# Patient Record
Sex: FEMALE | Race: WHITE | HISPANIC OR LATINO | Employment: OTHER | URBAN - METROPOLITAN AREA
[De-identification: names, ages, dates, MRNs, and addresses within clinical notes are randomized per-mention and may not be internally consistent; named-entity substitution may affect disease eponyms.]

---

## 2017-05-07 ENCOUNTER — APPOINTMENT (OUTPATIENT)
Dept: RADIOLOGY | Facility: MEDICAL CENTER | Age: 82
End: 2017-05-07
Attending: EMERGENCY MEDICINE
Payer: MEDICARE

## 2017-05-07 ENCOUNTER — HOSPITAL ENCOUNTER (EMERGENCY)
Facility: MEDICAL CENTER | Age: 82
End: 2017-05-07
Attending: EMERGENCY MEDICINE
Payer: MEDICARE

## 2017-05-07 VITALS
BODY MASS INDEX: 22.45 KG/M2 | OXYGEN SATURATION: 96 % | WEIGHT: 122 LBS | SYSTOLIC BLOOD PRESSURE: 118 MMHG | HEART RATE: 72 BPM | DIASTOLIC BLOOD PRESSURE: 76 MMHG | HEIGHT: 62 IN | TEMPERATURE: 99.2 F | RESPIRATION RATE: 16 BRPM

## 2017-05-07 DIAGNOSIS — G89.29 CHRONIC NONINTRACTABLE HEADACHE, UNSPECIFIED HEADACHE TYPE: ICD-10-CM

## 2017-05-07 DIAGNOSIS — G89.29 CHRONIC LOW BACK PAIN WITHOUT SCIATICA, UNSPECIFIED BACK PAIN LATERALITY: ICD-10-CM

## 2017-05-07 DIAGNOSIS — R51.9 CHRONIC NONINTRACTABLE HEADACHE, UNSPECIFIED HEADACHE TYPE: ICD-10-CM

## 2017-05-07 DIAGNOSIS — M54.50 CHRONIC LOW BACK PAIN WITHOUT SCIATICA, UNSPECIFIED BACK PAIN LATERALITY: ICD-10-CM

## 2017-05-07 LAB
ALBUMIN SERPL BCP-MCNC: 4.2 G/DL (ref 3.2–4.9)
ALBUMIN/GLOB SERPL: 1.5 G/DL
ALP SERPL-CCNC: 69 U/L (ref 30–99)
ALT SERPL-CCNC: 16 U/L (ref 2–50)
ANION GAP SERPL CALC-SCNC: 10 MMOL/L (ref 0–11.9)
APPEARANCE UR: CLEAR
APTT PPP: 25.4 SEC (ref 24.7–36)
AST SERPL-CCNC: 27 U/L (ref 12–45)
BASOPHILS # BLD AUTO: 0.5 % (ref 0–1.8)
BASOPHILS # BLD: 0.04 K/UL (ref 0–0.12)
BILIRUB SERPL-MCNC: 0.3 MG/DL (ref 0.1–1.5)
BILIRUB UR QL STRIP.AUTO: NEGATIVE
BNP SERPL-MCNC: 21 PG/ML (ref 0–100)
BUN SERPL-MCNC: 27 MG/DL (ref 8–22)
CALCIUM SERPL-MCNC: 10.2 MG/DL (ref 8.5–10.5)
CHLORIDE SERPL-SCNC: 105 MMOL/L (ref 96–112)
CO2 SERPL-SCNC: 22 MMOL/L (ref 20–33)
COLOR UR: NORMAL
CREAT SERPL-MCNC: 0.76 MG/DL (ref 0.5–1.4)
CULTURE IF INDICATED INDCX: NO UA CULTURE
EKG IMPRESSION: NORMAL
EKG IMPRESSION: NORMAL
EOSINOPHIL # BLD AUTO: 0.06 K/UL (ref 0–0.51)
EOSINOPHIL NFR BLD: 0.8 % (ref 0–6.9)
ERYTHROCYTE [DISTWIDTH] IN BLOOD BY AUTOMATED COUNT: 39.6 FL (ref 35.9–50)
GFR SERPL CREATININE-BSD FRML MDRD: >60 ML/MIN/1.73 M 2
GLOBULIN SER CALC-MCNC: 2.8 G/DL (ref 1.9–3.5)
GLUCOSE SERPL-MCNC: 107 MG/DL (ref 65–99)
GLUCOSE UR STRIP.AUTO-MCNC: NEGATIVE MG/DL
HCT VFR BLD AUTO: 39.8 % (ref 37–47)
HGB BLD-MCNC: 13.4 G/DL (ref 12–16)
IMM GRANULOCYTES # BLD AUTO: 0.01 K/UL (ref 0–0.11)
IMM GRANULOCYTES NFR BLD AUTO: 0.1 % (ref 0–0.9)
INR PPP: 1 (ref 0.87–1.13)
KETONES UR STRIP.AUTO-MCNC: NEGATIVE MG/DL
LEUKOCYTE ESTERASE UR QL STRIP.AUTO: NEGATIVE
LIPASE SERPL-CCNC: 41 U/L (ref 11–82)
LYMPHOCYTES # BLD AUTO: 3.28 K/UL (ref 1–4.8)
LYMPHOCYTES NFR BLD: 44.2 % (ref 22–41)
MCH RBC QN AUTO: 28.9 PG (ref 27–33)
MCHC RBC AUTO-ENTMCNC: 33.7 G/DL (ref 33.6–35)
MCV RBC AUTO: 85.8 FL (ref 81.4–97.8)
MICRO URNS: NORMAL
MONOCYTES # BLD AUTO: 0.54 K/UL (ref 0–0.85)
MONOCYTES NFR BLD AUTO: 7.3 % (ref 0–13.4)
NEUTROPHILS # BLD AUTO: 3.49 K/UL (ref 2–7.15)
NEUTROPHILS NFR BLD: 47.1 % (ref 44–72)
NITRITE UR QL STRIP.AUTO: NEGATIVE
NRBC # BLD AUTO: 0 K/UL
NRBC BLD AUTO-RTO: 0 /100 WBC
PH UR STRIP.AUTO: 6.5 [PH]
PLATELET # BLD AUTO: 272 K/UL (ref 164–446)
PMV BLD AUTO: 10.3 FL (ref 9–12.9)
POTASSIUM SERPL-SCNC: 3.6 MMOL/L (ref 3.6–5.5)
PROT SERPL-MCNC: 7 G/DL (ref 6–8.2)
PROT UR QL STRIP: NEGATIVE MG/DL
PROTHROMBIN TIME: 13.5 SEC (ref 12–14.6)
RBC # BLD AUTO: 4.64 M/UL (ref 4.2–5.4)
RBC UR QL AUTO: NEGATIVE
SODIUM SERPL-SCNC: 137 MMOL/L (ref 135–145)
SP GR UR STRIP.AUTO: 1.02
TROPONIN I SERPL-MCNC: <0.01 NG/ML (ref 0–0.04)
WBC # BLD AUTO: 7.4 K/UL (ref 4.8–10.8)

## 2017-05-07 PROCEDURE — 84484 ASSAY OF TROPONIN QUANT: CPT

## 2017-05-07 PROCEDURE — 99284 EMERGENCY DEPT VISIT MOD MDM: CPT

## 2017-05-07 PROCEDURE — 93005 ELECTROCARDIOGRAM TRACING: CPT

## 2017-05-07 PROCEDURE — 85730 THROMBOPLASTIN TIME PARTIAL: CPT

## 2017-05-07 PROCEDURE — 700111 HCHG RX REV CODE 636 W/ 250 OVERRIDE (IP): Performed by: EMERGENCY MEDICINE

## 2017-05-07 PROCEDURE — 71010 DX-CHEST-LIMITED (1 VIEW): CPT

## 2017-05-07 PROCEDURE — 83880 ASSAY OF NATRIURETIC PEPTIDE: CPT

## 2017-05-07 PROCEDURE — 70450 CT HEAD/BRAIN W/O DYE: CPT

## 2017-05-07 PROCEDURE — 85610 PROTHROMBIN TIME: CPT

## 2017-05-07 PROCEDURE — 81003 URINALYSIS AUTO W/O SCOPE: CPT

## 2017-05-07 PROCEDURE — 80053 COMPREHEN METABOLIC PANEL: CPT

## 2017-05-07 PROCEDURE — A9270 NON-COVERED ITEM OR SERVICE: HCPCS | Performed by: EMERGENCY MEDICINE

## 2017-05-07 PROCEDURE — 83690 ASSAY OF LIPASE: CPT

## 2017-05-07 PROCEDURE — 36415 COLL VENOUS BLD VENIPUNCTURE: CPT

## 2017-05-07 PROCEDURE — 85025 COMPLETE CBC W/AUTO DIFF WBC: CPT

## 2017-05-07 PROCEDURE — 700102 HCHG RX REV CODE 250 W/ 637 OVERRIDE(OP): Performed by: EMERGENCY MEDICINE

## 2017-05-07 RX ORDER — ONDANSETRON 4 MG/1
4 TABLET, ORALLY DISINTEGRATING ORAL EVERY 8 HOURS PRN
Qty: 10 TAB | Refills: 0 | Status: SHIPPED | OUTPATIENT
Start: 2017-05-07 | End: 2017-08-14

## 2017-05-07 RX ORDER — ONDANSETRON 4 MG/1
4 TABLET, ORALLY DISINTEGRATING ORAL EVERY 8 HOURS PRN
Qty: 10 TAB | Refills: 0 | Status: SHIPPED | OUTPATIENT
Start: 2017-05-07 | End: 2017-05-07

## 2017-05-07 RX ORDER — OXYCODONE HYDROCHLORIDE AND ACETAMINOPHEN 5; 325 MG/1; MG/1
1 TABLET ORAL ONCE
Status: COMPLETED | OUTPATIENT
Start: 2017-05-07 | End: 2017-05-07

## 2017-05-07 RX ORDER — ONDANSETRON 4 MG/1
4 TABLET, ORALLY DISINTEGRATING ORAL ONCE
Status: COMPLETED | OUTPATIENT
Start: 2017-05-07 | End: 2017-05-07

## 2017-05-07 RX ADMIN — OXYCODONE HYDROCHLORIDE AND ACETAMINOPHEN 1 TABLET: 5; 325 TABLET ORAL at 22:03

## 2017-05-07 RX ADMIN — ONDANSETRON 4 MG: 4 TABLET, ORALLY DISINTEGRATING ORAL at 22:02

## 2017-05-07 ASSESSMENT — LIFESTYLE VARIABLES: DO YOU DRINK ALCOHOL: NO

## 2017-05-07 ASSESSMENT — PAIN SCALES - GENERAL: PAINLEVEL_OUTOF10: 2

## 2017-05-07 NOTE — ED AVS SNAPSHOT
5/7/2017    Navya Mcbride  [address] [place] [address]    Dear Navya:    [facility] wants to ensure your discharge home is safe and you or your loved ones have had all of your questions answered regarding your care after you leave the hospital.    Below is a list of resources and contact information should you have any questions regarding your hospital stay, follow-up instructions, or active medical symptoms.    Questions or Concerns Regarding… Contact   Medical Questions Related to Your Discharge  (7 days a week, 8am-5pm) Contact a Nurse Care Coordinator   [phone]   Medical Questions Not Related to Your Discharge  (24 hours a day / 7 days a week)  Contact the Nurse Health Line   [phone]    Medications or Discharge Instructions Refer to your discharge packet   or contact your [facility] Primary Care Provider   [phone]   Follow-up Appointment(s) Schedule your appointment via [facility]   or contact Scheduling [phone]   Billing Review your statement via [facility]  or contact Billing [phone]   Medical Records Review your records via [facility]   or contact Medical Records [phone]     You may receive a telephone call within two days of discharge. This call is to make certain you understand your discharge instructions and have the opportunity to have any questions answered. You can also easily access your medical information, test results and upcoming appointments via the [facility] free online health management tool. You can learn more and sign up at [employer]. For assistance setting up your [facility] account, please call [phone].    Once again, we want to ensure your discharge home is safe and that you have a clear understanding of any next steps in your care. If you have any questions or concerns, please do not hesitate to contact us, we are here for you. Thank you for choosing [facility] for your healthcare needs.    Sincerely,    Your [facility] Healthcare Team

## 2017-05-07 NOTE — ED AVS SNAPSHOT
Home Care Instructions                                                                                                                Navya Mcbride   MRN: [id number]    Department:  [facility], Emergency Dept   Date of Visit:  5/7/2017            [facility], Emergency Dept    [address] [place] [id number]    Phone:  [phone]      You were seen by     Elisabeth Delgado M.D.      Your Diagnosis Was     Chronic nonintractable headache, unspecified headache type     R51       These are the medications you received during your hospitalization from 05/07/2017 1724 to 05/07/2017 2320     Date/Time Order Dose Route Action    05/07/2017 2203 oxycodone-acetaminophen (PERCOCET) 5-325 MG per tablet 1 Tab 1 Tab Oral Given    05/07/2017 2202 ondansetron (ZOFRAN ODT) dispertab 4 mg 4 mg Oral Given      Follow-up Information     1. Schedule an appointment as soon as possible for a visit with Thom Rodriguez M.D..    Specialty:  Family Medicine    Why:  please follow up regarding your blood pressure it was elevated today    Contact information    [address] [place] [address] [phone]          2. Follow up with [facility], Emergency Dept.    Specialty:  Emergency Medicine    Why:  Return for worsening pain, vomiting, fever or other concerns    Contact information    [address] [place] [id number] [phone]      Medication Information     Review all of your home medications and newly ordered medications with your primary doctor and/or pharmacist as soon as possible. Follow medication instructions as directed by your doctor and/or pharmacist.     Please keep your complete medication list with you and share with your physician. Update the information when medications are discontinued, doses are changed, or new medications (including over-the-counter products) are added; and carry medication information at all times in the event  of emergency situations.               Medication List      START taking these medications        Instructions    Morning Afternoon Evening Bedtime    ondansetron 4 MG Tbdp   Last time this was given:  4 mg on 5/7/2017 10:02 PM   Commonly known as:  ZOFRAN ODT        Take 1 Tab by mouth every 8 hours as needed for Nausea/Vomiting.   Dose:  4 mg                          ASK your doctor about these medications        Instructions    Morning Afternoon Evening Bedtime    ADVIL 200 MG Tabs   Generic drug:  ibuprofen        Take 200 mg by mouth every 6 hours as needed.   Dose:  200 mg                        ALBUTEROL INH        Inhale 1-2 Puffs by mouth every four hours as needed. For shortness of breath   Dose:  1-2 Puff                        AMITIZA 8 MCG Caps   Generic drug:  Lubiprostone        Take  by mouth 2 Times a Day.                        amitriptyline 25 MG Tabs   Commonly known as:  ELAVIL        Take 75 mg by mouth at bedtime as needed.   Dose:  75 mg                        aspirin 81 MG tablet        Take 81 mg by mouth every day.   Dose:  81 mg                        ATIVAN 2 MG tablet   Generic drug:  lorazepam        Take 2 mg by mouth at bedtime as needed. bedtime   Dose:  2 mg                        atorvastatin 40 MG Tabs   Commonly known as:  LIPITOR        Take 40 mg by mouth every evening.   Dose:  40 mg                        cefUROXime 250 MG Tabs   Commonly known as:  CEFTIN        Take 1 Tab by mouth 2 times a day.   Dose:  250 mg                        CRANBERRY        400 mg by Does not apply route 2 Times a Day.   Dose:  400 mg                        CYMBALTA 30 MG Cpep   Generic drug:  duloxetine        Take 30 mg by mouth every day.   Dose:  30 mg                        docusate sodium 100 MG Caps   Commonly known as:  COLACE        Take 100 mg by mouth 2 times a day.   Dose:  100 mg                        fluorometholone 0.1 % Susp   Commonly known as:  FML        Place 1 Drop in both  eyes every 4 hours.   Dose:  1 Drop                        latanoprost 0.005 % Soln   Commonly known as:  XALATAN        Place 1 Drop in both eyes every evening.   Dose:  1 Drop                        metoprolol 25 MG Tabs   Commonly known as:  LOPRESSOR        Take 1 Tab by mouth 2 times a day.   Dose:  25 mg                        MIRALAX Powd   Generic drug:  polyethylene glycol 3350        Take 17 g by mouth every day.   Dose:  17 g                        NEXIUM 40 MG delayed-release capsule   Generic drug:  esomeprazole        Take 40 mg by mouth every morning before breakfast.   Dose:  40 mg                        nitrofurantoin monohydr macro 100 MG Caps   Commonly known as:  MACROBID        Take 1 Cap by mouth 2 times a day.   Dose:  100 mg                        nitroglycerin 0.4 MG Subl   Commonly known as:  NITROSTAT        Place 1 Tab under tongue as needed for Chest Pain.   Dose:  0.4 mg                        oxycodone-acetaminophen 5-325 MG Tabs   Last time this was given:  1 Tab on 5/7/2017 10:03 PM   Commonly known as:  PERCOCET        Take 1-2 Tabs by mouth every four hours as needed.   Dose:  1-2 Tab                        PLAVIX 75 MG Tabs   Generic drug:  clopidogrel        Take 75 mg by mouth every day.   Dose:  75 mg                        predniSONE 20 MG Tabs   Commonly known as:  DELTASONE        Take one tablet every 6 hours starting at 1pm on 7/10; ending at 7 am on 7/11                        REMERON SOLTAB 30 MG Tbdp   Generic drug:  mirtazapine        Take 30 mg by mouth every evening.   Dose:  30 mg                        RESTASIS 0.05 % ophthalmic emulsion   Generic drug:  cyclosporin        Place 1 Drop in both eyes 2 times a day.   Dose:  1 Drop                        STOOL SOFTENER PO        Take  by mouth 2 Times a Day.                        SYMBICORT INH        Inhale  by mouth.                        VITAMIN D3        every day. Two tsp                             Where to Get  Your Medications      You can get these medications from any pharmacy     Bring a paper prescription for each of these medications    - ondansetron 4 MG Tbdp            Procedures and tests performed during your visit     APTT    Btype Natriuretic Peptide    CBC with Differential    CT-HEAD W/O    Cardiac Monitoring    Complete Metabolic Panel (CMP)    DX-CHEST-LIMITED (1 VIEW)    EKG (ER)    EKG (NOW)    ESTIMATED GFR    Lipase    Maintain O2 sats greater than 94%    Oxygen Therapy per Protocol    Prothrombin Time    Saline Lock    Troponin    URINALYSIS,CULTURE IF INDICATED        Discharge Instructions       General Headache Without Cause  A headache is pain or discomfort felt around the head or neck area. The specific cause of a headache may not be found. There are many causes and types of headaches. A few common ones are:  · Tension headaches.  · Migraine headaches.  · Cluster headaches.  · Chronic daily headaches.  HOME CARE INSTRUCTIONS   · Keep all follow-up appointments with your health care provider or any specialist referral.  · Only take over-the-counter or prescription medicines for pain or discomfort as directed by your health care provider.  · Lie down in a dark, quiet room when you have a headache.  · Keep a headache journal to find out what may trigger your migraine headaches. For example, write down:  ¨ What you eat and drink.  ¨ How much sleep you get.  ¨ Any change to your diet or medicines.  · Try massage or other relaxation techniques.  · Put ice packs or heat on the head and neck. Use these 3 to 4 times per day for 15 to 20 minutes each time, or as needed.  · Limit stress.  · Sit up straight, and do not tense your muscles.  · Quit smoking if you smoke.  · Limit alcohol use.  · Decrease the amount of caffeine you drink, or stop drinking caffeine.  · Eat and sleep on a regular schedule.  · Get 7 to 9 hours of sleep, or as recommended by your health care provider.  · Keep lights dim if bright  lights bother you and make your headaches worse.  SEEK MEDICAL CARE IF:   · You have problems with the medicines you were prescribed.  · Your medicines are not working.  · You have a change from the usual headache.  · You have nausea or vomiting.  SEEK IMMEDIATE MEDICAL CARE IF:   · Your headache becomes severe.  · You have a fever.  · You have a stiff neck.  · You have loss of vision.  · You have muscular weakness or loss of muscle control.  · You start losing your balance or have trouble walking.  · You feel faint or pass out.  · You have severe symptoms that are different from your first symptoms.     This information is not intended to replace advice given to you by your health care provider. Make sure you discuss any questions you have with your health care provider.     Document Released: 12/18/2006 Document Revised: 05/03/2016 Document Reviewed: 01/02/2013  [employer] Interactive Patient Education ©2016 [employer]            Patient Information     Patient Information    Following emergency treatment: all patient requiring follow-up care must return either to a private physician or a clinic if your condition worsens before you are able to obtain further medical attention, please return to the emergency room.     Billing Information    At [facility], we work to make the billing process streamlined for our patients.  Our Representatives are here to answer any questions you may have regarding your hospital bill.  If you have insurance coverage and have supplied your insurance information to us, we will submit a claim to your insurer on your behalf.  Should you have any questions regarding your bill, we can be reached online or by phone as follows:  Online: You are able pay your bills online or live chat with our representatives about any billing questions you may have. We are here to help Monday - Friday from 8:00am to 7:30pm and 9:00am - 12:00pm on Saturdays.  Please visit  [email or web address]  for more information.   Phone:  [phone] or [phone]    Please note that your emergency physician, surgeon, pathologist, radiologist, anesthesiologist, and other specialists are not employed by [facility] and will therefore bill separately for their services.  Please contact them directly for any questions concerning their bills at the numbers below:     Emergency Physician Services:  [phone]  [facility]:  [phone]  [facility]:  [phone]  [facility]:  [phone]    1. Your final bill may vary from the amount quoted upon discharge if all procedures are not complete at that time, or if your doctor has additional procedures of which we are not aware. You will receive an additional bill if you return to the Emergency Department at [facility] for suture removal regardless of the facility of which the sutures were placed.     2. Please arrange for settlement of this account at the emergency registration.    3. All self-pay accounts are due in full at the time of treatment.  If you are unable to meet this obligation then payment is expected within 4-5 days.     4. If you have had radiology studies (CT, X-ray, Ultrasound, MRI), you have received a preliminary result during your emergency department visit. Please contact the radiology department [phone] to receive a copy of your final result. Please discuss the Final result with your primary physician or with the follow up physician provided.     Crisis Hotline:  [place] Crisis Hotline:  4-888-DSVLLZJ or 1-199.996.1690  [place] Crisis Hotline:    1-541.836.6411 or 170-770-0988         ED Discharge Follow Up Questions    1. In order to provide you with very good care, we would like to follow up with a phone call in the next few days.  May we have your permission to contact you?     YES /  NO    2. What is the best phone number to call  you? (       )_____-__________    3. What is the best time to call you?      Morning  /  Afternoon  /  Evening                   Patient Signature:  ____________________________________________________________    Date:  ____________________________________________________________

## 2017-05-07 NOTE — ED AVS SNAPSHOT
[employer] Access Code: [email or web address]  Expires: 6/6/2017 11:20 PM    Your email address is not on file at [facility].  Email Addresses are required for you to sign up for [employer], please contact [phone] to verify your personal information and to provide your email address prior to attempting to register for [employer].    Navya Mcbride  [address] [place] [address]    [employer]  A secure, online tool to manage your health information     [employer]’s [employer]® is a secure, online tool that connects you to your personalized health information from the privacy of your home -- day or night - making it very easy for you to manage your healthcare. Once the activation process is completed, you can even access your medical information using the [employer] neeraj, which is available for free in the Apple Neeraj store or Google Play store.     To learn more about [employer], visit [email or web address]    There are two levels of access available (as shown below):   My Chart Features  [facility] Primary Care Doctor [facility]  Specialists [facility]  Urgent  Care Non-[facility] Primary Care Doctor   Email your healthcare team securely and privately 24/7 X X X    Manage appointments: schedule your next appointment; view details of past/upcoming appointments X      Request prescription refills. X      View recent personal medical records, including lab and immunizations X X X X   View health record, including health history, allergies, medications X X X X   Read reports about your outpatient visits, procedures, consult and ER notes X X X X   See your discharge summary, which is a recap of your hospital and/or ER visit that includes your diagnosis, lab results, and care plan X X  X     How to register for [employer]:  Once your e-mail address has been verified, follow the following steps to sign up for [employer].     1. Go to  [email or web address]  2. Click on the Sign Up Now box, which takes you to the New Member Sign Up page.  You will need to provide the following information:  a. Enter your [employer] Access Code exactly as it appears at the top of this page. (You will not need to use this code after you’ve completed the sign-up process. If you do not sign up before the expiration date, you must request a new code.)   b. Enter your date of birth.   c. Enter your home email address.   d. Click Submit, and follow the next screen’s instructions.  3. Create a [employer] ID. This will be your [employer] login ID and cannot be changed, so think of one that is secure and easy to remember.  4. Create a [employer] password. You can change your password at any time.  5. Enter your Password Reset Question and Answer. This can be used at a later time if you forget your password.   6. Enter your e-mail address. This allows you to receive e-mail notifications when new information is available in [employer].  7. Click Sign Up. You can now view your health information.    For assistance activating your [employer] account, call [phone]

## 2017-05-08 NOTE — DISCHARGE INSTRUCTIONS
General Headache Without Cause  A headache is pain or discomfort felt around the head or neck area. The specific cause of a headache may not be found. There are many causes and types of headaches. A few common ones are:  · Tension headaches.  · Migraine headaches.  · Cluster headaches.  · Chronic daily headaches.  HOME CARE INSTRUCTIONS   · Keep all follow-up appointments with your health care provider or any specialist referral.  · Only take over-the-counter or prescription medicines for pain or discomfort as directed by your health care provider.  · Lie down in a dark, quiet room when you have a headache.  · Keep a headache journal to find out what may trigger your migraine headaches. For example, write down:  ¨ What you eat and drink.  ¨ How much sleep you get.  ¨ Any change to your diet or medicines.  · Try massage or other relaxation techniques.  · Put ice packs or heat on the head and neck. Use these 3 to 4 times per day for 15 to 20 minutes each time, or as needed.  · Limit stress.  · Sit up straight, and do not tense your muscles.  · Quit smoking if you smoke.  · Limit alcohol use.  · Decrease the amount of caffeine you drink, or stop drinking caffeine.  · Eat and sleep on a regular schedule.  · Get 7 to 9 hours of sleep, or as recommended by your health care provider.  · Keep lights dim if bright lights bother you and make your headaches worse.  SEEK MEDICAL CARE IF:   · You have problems with the medicines you were prescribed.  · Your medicines are not working.  · You have a change from the usual headache.  · You have nausea or vomiting.  SEEK IMMEDIATE MEDICAL CARE IF:   · Your headache becomes severe.  · You have a fever.  · You have a stiff neck.  · You have loss of vision.  · You have muscular weakness or loss of muscle control.  · You start losing your balance or have trouble walking.  · You feel faint or pass out.  · You have severe symptoms that are different from your first symptoms.     This  information is not intended to replace advice given to you by your health care provider. Make sure you discuss any questions you have with your health care provider.     Document Released: 12/18/2006 Document Revised: 05/03/2016 Document Reviewed: 01/02/2013  [employer] Interactive Patient Education ©2016 [employer]

## 2017-05-08 NOTE — ED NOTES
Pt states feeling much better after pain medications. Rates headache 2/10. No nausea or dizziness now.

## 2017-05-08 NOTE — ED PROVIDER NOTES
ED Provider Note    CHIEF COMPLAINT  Chief Complaint   Patient presents with   • Back Pain   • Headache   • N/V       HPI  Navya Margaux Mcbride is a 81 y.o. female who presents with a headache. She has chronic headaches and has for many months to years. She is complaining that this headache is more intense but otherwise feels the same as prior headaches. She did have a deficit of vomiting earlier. Light does bother her. She also is complaining of upper and lower back pain. This is somewhat chronic in nature she always has low back pain. She has a history of degenerative disease and arthritis. She denies any chest pain no shortness of breath. She did have one episode of vomiting. She denies any abdominal pain. Her family notes that she's been slightly more forgetful as well. She has had increased urinary frequency but denies any pain with urination. She has a history of frequent UTIs.      REVIEW OF SYSTEMS  positive for headache one episode of vomiting back pain, negative for chest pain shortness of breath. All other systems are negative.     PAST MEDICAL HISTORY   has a past medical history of Colitis; Gastritis; Fibromyalgia; Coarse tremors; Heart burn; Breath shortness; Indigestion; Dental disorder; Hiatus hernia syndrome; CATARACT; Pain; Urinary bladder disorder; Osteoporosis; Pneumonia; Backpain; Anemia; Pulmonary embolism; Other specified disorder of intestines; Asthma; Psychiatric problem; Clostridium difficile infection (2009); Stroke (2009); Anesthesia; Myocardial infarct (5/2015); and Arthritis.    SOCIAL HISTORY  Social History     Social History Main Topics   • Smoking status: Never Smoker    • Smokeless tobacco: Never Used   • Alcohol Use: No   • Drug Use: No   • Sexual Activity: Not on file       SURGICAL HISTORY   has past surgical history that includes erendira by laparoscopy (4/19/2009); rotator cuff repair (2000); hysterectomy, total abdominal (1973); knee arthroscopy (10/8/2009); medial  "meniscectomy (10/8/2009); meniscectomy (10/8/2009); and recovery (6/11/2015).    CURRENT MEDICATIONS  Home Medications     **Home medications have not yet been reviewed for this encounter**          ALLERGIES  Allergies   Allergen Reactions   • Iodine Hives     dyspnea   • Tequin [Gatifloxacin Sesquihydrate] Hives     Severe rash and SOB       PHYSICAL EXAM  VITAL SIGNS: /69 mmHg  Pulse 86  Temp(Src) 37.3 °C (99.2 °F)  Resp 18  Ht 1.575 m (5' 2\")  Wt 55.339 kg (122 lb)  BMI 22.31 kg/m2  SpO2 93%.  Constitutional: Alert in no apparent distress.  HENT: No signs of trauma, Bilateral external ears normal, Nose normal. TMs normal  Eyes: Pupils are equal and reactive, Conjunctiva normal, Non-icteric.   Neck: Normal range of motion, No tenderness, Supple, No stridor.   Cardiovascular: Regular rate and rhythm, no murmurs.   Thorax & Lungs: Normal breath sounds, No respiratory distress, No wheezing, No chest tenderness.   Abdomen: Bowel sounds normal, Soft, No tenderness, No masses, No peritoneal signs.  Skin: Warm, Dry, No erythema, No rash.   Back: No bony tenderness, kyphotic  Musculoskeletal:  no major deformities noted.   Neurologic: Alert,  No focal deficits noted.   Psychiatric: Affect normal, Judgment normal, Mood normal.       DIAGNOSTIC STUDIES / PROCEDURES      EKG  12 Lead EKG interpreted by me to show:  Normal sinus rhythm  Rate 96  Axis: Normal  Intervals: Normal  Normal T waves  Normal ST segments  My impression of this EKG: Does not indicate ischemia or arrythmia at this time.      LABS  Results for orders placed or performed during the hospital encounter of 05/07/17   Troponin   Result Value Ref Range    Troponin I <0.01 0.00 - 0.04 ng/mL   Btype Natriuretic Peptide   Result Value Ref Range    B Natriuretic Peptide 21 0 - 100 pg/mL   CBC with Differential   Result Value Ref Range    WBC 7.4 4.8 - 10.8 K/uL    RBC 4.64 4.20 - 5.40 M/uL    Hemoglobin 13.4 12.0 - 16.0 g/dL    Hematocrit 39.8 37.0 - " 47.0 %    MCV 85.8 81.4 - 97.8 fL    MCH 28.9 27.0 - 33.0 pg    MCHC 33.7 33.6 - 35.0 g/dL    RDW 39.6 35.9 - 50.0 fL    Platelet Count 272 164 - 446 K/uL    MPV 10.3 9.0 - 12.9 fL    Neutrophils-Polys 47.10 44.00 - 72.00 %    Lymphocytes 44.20 (H) 22.00 - 41.00 %    Monocytes 7.30 0.00 - 13.40 %    Eosinophils 0.80 0.00 - 6.90 %    Basophils 0.50 0.00 - 1.80 %    Immature Granulocytes 0.10 0.00 - 0.90 %    Nucleated RBC 0.00 /100 WBC    Neutrophils (Absolute) 3.49 2.00 - 7.15 K/uL    Lymphs (Absolute) 3.28 1.00 - 4.80 K/uL    Monos (Absolute) 0.54 0.00 - 0.85 K/uL    Eos (Absolute) 0.06 0.00 - 0.51 K/uL    Baso (Absolute) 0.04 0.00 - 0.12 K/uL    Immature Granulocytes (abs) 0.01 0.00 - 0.11 K/uL    NRBC (Absolute) 0.00 K/uL   Complete Metabolic Panel (CMP)   Result Value Ref Range    Sodium 137 135 - 145 mmol/L    Potassium 3.6 3.6 - 5.5 mmol/L    Chloride 105 96 - 112 mmol/L    Co2 22 20 - 33 mmol/L    Anion Gap 10.0 0.0 - 11.9    Glucose 107 (H) 65 - 99 mg/dL    Bun 27 (H) 8 - 22 mg/dL    Creatinine 0.76 0.50 - 1.40 mg/dL    Calcium 10.2 8.5 - 10.5 mg/dL    AST(SGOT) 27 12 - 45 U/L    ALT(SGPT) 16 2 - 50 U/L    Alkaline Phosphatase 69 30 - 99 U/L    Total Bilirubin 0.3 0.1 - 1.5 mg/dL    Albumin 4.2 3.2 - 4.9 g/dL    Total Protein 7.0 6.0 - 8.2 g/dL    Globulin 2.8 1.9 - 3.5 g/dL    A-G Ratio 1.5 g/dL   Prothrombin Time   Result Value Ref Range    PT 13.5 12.0 - 14.6 sec    INR 1.00 0.87 - 1.13   APTT   Result Value Ref Range    APTT 25.4 24.7 - 36.0 sec   Lipase   Result Value Ref Range    Lipase 41 11 - 82 U/L   ESTIMATED GFR   Result Value Ref Range    GFR If African American >60 >60 mL/min/1.73 m 2    GFR If Non African American >60 >60 mL/min/1.73 m 2   URINALYSIS,CULTURE IF INDICATED   Result Value Ref Range    Micro Urine Req see below     Color Dark Yellow     Character Clear     Specific Gravity 1.025 <1.035    Ph 6.5 5.0-8.0    Glucose Negative Negative mg/dL    Ketones Negative Negative mg/dL     Protein Negative Negative mg/dL    Bilirubin Negative Negative    Nitrite Negative Negative    Leukocyte Esterase Negative Negative    Occult Blood Negative Negative    Culture Indicated No UA Culture   EKG (NOW)   Result Value Ref Range    Report       [facility] Emergency Dept.    Test Date:  2017  Pt Name:    [facility]              Department: ER  MRN:        [id number]                      Room:  Gender:     F                            Technician: [id number]  :        1935                   Requested By:ER TRIAGE PROTOCOL  Order #:    [id number]                    Reading MD: ЮЛИЯ SILVA    Measurements  Intervals                                Axis  Rate:       96                           P:          59  DC:         208                          QRS:        -27  QRSD:       86                           T:          38  QT:         364  QTc:        460    Interpretive Statements  SINUS RHYTHM  BORDERLINE AV CONDUCTION DELAY  CONSIDER LEFT ATRIAL ABNORMALITY  BORDERLINE LEFT AXIS DEVIATION  EARLY PRECORDIAL R/S TRANSITION  Compared to ECG 2015 13:32:49  Ectopic atrial rhythm no longer present  Poor R-wave progression no longer present    Electronically Signed On 2017 22:09:43 PDT by ЮЛИЯ SILVA     EKG (ER)   Result Value Ref Range    Report       [facility] Emergency Dept.    Test Date:  2017  Pt Name:    [facility]              Department: ER  MRN:        [id number]                      Room:       RD 07  Gender:     F                            Technician: [id number]  :        1935                   Requested By:ER TRIAGE PROTOCOL  Order #:    [id number]                    Reading MD: ЮЛИЯ SILVA    Measurements  Intervals                                Axis  Rate:       97                           P:          55  DC:         204                          QRS:        -31  QRSD:       84                           T:          43  QT:          360  QTc:        458    Interpretive Statements  SINUS RHYTHM  ABERRANT COMPLEX, POSSIBLY SUPRAVENTRICULAR  LEFT AXIS DEVIATION  RSR' IN V1 OR V2, RIGHT VCD OR RVH  BASELINE WANDER IN LEAD(S) I,III,aVL  Compared to ECG 05/07/2015 13:32:49  Aberrant conduction of supraventricular beat(s) now present  Right ventricular hypertrophy now present  RSR' in V1 or V2  now present  Ectopic atrial rhythm no longer present  Poor R-wave progression no longer present    Electronically Signed On 5-7-2017 22:09:27 PDT by ЮЛИЯ SILVA           RADIOLOGY  CT-HEAD W/O   Final Result      No acute intracranial abnormality identified.      DX-CHEST-LIMITED (1 VIEW)   Final Result         No acute cardiopulmonary abnormalities are identified.              COURSE & MEDICAL DECISION MAKING  Pertinent Labs & Imaging studies reviewed. (See chart for details)  This is an 81-year-old female that presents with headache. The patient has chronic headaches this is very similar to her prior headaches but more intense. She has fibromyalgia and has pain all over. She has arthritis and chronic back pain. The patient is alert she has no focal neurologic deficits. She is mildly hypertensive at 133/69. She has a history history of glaucoma which she is being treated for. I will obtain a head CT to rule out intracranial bleed given this is worse today. She'll be treated symptomatically for her headache. Urinalysis will also be obtained she does have a history of stroke and with a UTI she did have some worsening pains and headaches. Labs have been obtained troponin is negative CBC is normal CMP is essentially normal. I believe her back pain is chronic in nature. She'll be treated symptomatically for her headache at this time.    Head CT is normal. The patient felt much better after one Percocet and Zofran. Her urinalysis is negative. I do think this is a slight worsening of her chronic headaches. I do not see any evidence of intercranial bleed.  She is afebrile her labs otherwise normally do not think she has any concern for meningitis. She will be discharged home.     The patient will return for new or worsening symptoms and is stable at the time of discharge. Patient was given return precautions. Patient and/or family member verbalizes understanding and will comply.    DISPOSITION:  Patient will be discharged home in stable condition.    FOLLOW UP:  Thom Rodriguez M.D.  [address] [place] [id number]  [phone]    Schedule an appointment as soon as possible for a visit  please follow up regarding your blood pressure it was elevated today    [facility], Emergency Dept  [address] [facility] [address] [phone]    Return for worsening pain, vomiting, fever or other concerns      OUTPATIENT MEDICATIONS:  New Prescriptions    ONDANSETRON (ZOFRAN ODT) 4 MG TABLET DISPERSIBLE    Take 1 Tab by mouth every 8 hours as needed for Nausea/Vomiting.           Your blood pressure is elevated here in the emergency department. Please monitor your blood pressure over the next several days. If your blood pressure continues to be 120/80 or higher please contact your physician for blood pressure management.       FINAL IMPRESSION  1. Chronic nonintractable headache, unspecified headache type    2. Chronic low back pain without sciatica, unspecified back pain laterality        2.   3.    This dictation has been creating using voice recognition software. The accuracy of the dictation is limited the abilities of the software.  I expect there may be some errors of grammar and possibly content. I made every attempt to manually correct the errors within my dictation. However errors related to this voice recognition software may still exist and should be interpreted within the appropriate context.      The note accurately reflects work and decisions made by me.  Elisabeth Delgado  5/7/2017  11:19 PM

## 2017-07-20 ENCOUNTER — OFFICE VISIT (OUTPATIENT)
Dept: URGENT CARE | Facility: PHYSICIAN GROUP | Age: 82
End: 2017-07-20
Payer: MEDICARE

## 2017-07-20 ENCOUNTER — APPOINTMENT (OUTPATIENT)
Dept: RADIOLOGY | Facility: IMAGING CENTER | Age: 82
End: 2017-07-20
Attending: NURSE PRACTITIONER
Payer: MEDICARE

## 2017-07-20 VITALS
HEART RATE: 70 BPM | WEIGHT: 140 LBS | BODY MASS INDEX: 27.48 KG/M2 | HEIGHT: 60 IN | SYSTOLIC BLOOD PRESSURE: 92 MMHG | RESPIRATION RATE: 18 BRPM | TEMPERATURE: 98.3 F | OXYGEN SATURATION: 87 % | DIASTOLIC BLOOD PRESSURE: 54 MMHG

## 2017-07-20 DIAGNOSIS — R51.9 HEADACHE, UNSPECIFIED HEADACHE TYPE: ICD-10-CM

## 2017-07-20 DIAGNOSIS — R09.02 HYPOXIA: ICD-10-CM

## 2017-07-20 PROCEDURE — 99213 OFFICE O/P EST LOW 20 MIN: CPT | Performed by: NURSE PRACTITIONER

## 2017-07-20 PROCEDURE — 71020 DX-CHEST-2 VIEWS: CPT | Mod: TC | Performed by: NURSE PRACTITIONER

## 2017-07-20 RX ORDER — ESOMEPRAZOLE MAGNESIUM 40 MG/1
40 GRANULE, DELAYED RELEASE ORAL
COMMUNITY
End: 2017-08-14

## 2017-07-20 RX ORDER — CELECOXIB 200 MG/1
200 CAPSULE ORAL DAILY
COMMUNITY

## 2017-07-20 RX ORDER — LORAZEPAM 0.5 MG/1
0.5 TABLET ORAL EVERY MORNING
COMMUNITY
End: 2019-05-20

## 2017-07-20 ASSESSMENT — ENCOUNTER SYMPTOMS
HEADACHES: 1
SORE THROAT: 0
BLOOD IN STOOL: 0
COUGH: 0
FEVER: 0
MYALGIAS: 0
NAUSEA: 0
DIZZINESS: 1
VOMITING: 0

## 2017-07-20 NOTE — MR AVS SNAPSHOT
Navya Damico Reed   2017 4:15 PM   Office Visit   MRN: [id number]    Department:  [facility]   Dept Phone:  [phone]    Description:  Female : 1935   Provider:  ALMA BarcenasPEBONY           Reason for Visit     Dizziness dizziness and [id number]      Allergies as of 2017     Allergen Noted Reactions    Iodine 2009   Hives    dyspnea    Tequin [Gatifloxacin Sesquihydrate] 10/07/2009   Hives    Severe rash and SOB      You were diagnosed with     Headache, unspecified headache type   [5039835]       Hypoxia   [841260]         Vital Signs     Blood Pressure Pulse Temperature Respirations Height Weight    92/54 mmHg 70 36.8 °C (98.3 °F) 18 1.524 m (5') 63.504 kg (140 lb)    Body Mass Index Oxygen Saturation Smoking Status             27.34 kg/m2 87% Never Smoker          Basic Information     Date Of Birth Sex Race Ethnicity Preferred Language    1935 Female White  Origin (Amharic,Moldovan,[place]ican,Nicolas, etc) English      Problem List              ICD-10-CM Priority Class Noted - Resolved    Other chest pain R07.89   2015 - Present    Abnormal myocardial perfusion study    2015 - Present    H/O: stroke Z86.73   2015 - Present    Memory loss R41.3   2015 - Present    Fibromyalgia M79.7   6/3/2015 - Present    H/O arthroscopic knee surgery Z98.890   Unknown - Present    Pulmonary embolism (CMS-HCC) I26.99   Unknown - Present    GERD (gastroesophageal reflux disease) K21.9   6/3/2015 - Present    Colitis K52.9   Unknown - Present    Glaucoma H40.9   6/3/2015 - Present    Allergy to iodine Z88.8   6/3/2015 - Present      Health Maintenance        Date Due Completion Dates    IMM DTaP/Tdap/Td Vaccine (1 - Tdap) 1955 ---    PAP SMEAR 1957 ---    MAMMOGRAM 1976 ---    COLONOSCOPY 1986 ---    IMM ZOSTER VACCINE 1996 ---    IMM PNEUMOCOCCAL 65+ (ADULT) LOW/MEDIUM RISK SERIES (2 of 2 - PCV13) 10/27/2010 10/27/2009    BONE  DENSITY 8/10/2012 8/10/2007    IMM INFLUENZA (1) 9/1/2017 10/27/2009            Current Immunizations     Influenza TIV (IM) 10/27/2009  1:30 PM    Pneumococcal polysaccharide vaccine (PPSV-23) 10/27/2009  1:30 PM      Below and/or attached are the medications your provider expects you to take. Review all of your home medications and newly ordered medications with your provider and/or pharmacist. Follow medication instructions as directed by your provider and/or pharmacist. Please keep your medication list with you and share with your provider. Update the information when medications are discontinued, doses are changed, or new medications (including over-the-counter products) are added; and carry medication information at all times in the event of emergency situations     Allergies:  IODINE - Hives     TEQUIN - Hives               Medications  Valid as of: July 20, 2017 -  5:36 PM    Generic Name Brand Name Tablet Size Instructions for use    Albuterol   Inhale 1-2 Puffs by mouth every four hours as needed. For shortness of breath         Amitriptyline HCl (Tab) ELAVIL 25 MG Take 75 mg by mouth at bedtime as needed.        Aspirin (Tab) aspirin 81 MG Take 81 mg by mouth every day.        Atorvastatin Calcium (Tab) LIPITOR 40 MG Take 40 mg by mouth every evening.        Budesonide-Formoterol Fumarate   Inhale  by mouth.        Cefuroxime Axetil (Tab) CEFTIN 250 MG Take 1 Tab by mouth 2 times a day.        Celecoxib (Cap) CELEBREX 200 MG Take 200 mg by mouth 2 times a day.        Cholecalciferol   every day. Two tsp        Clopidogrel Bisulfate (Tab) PLAVIX 75 MG Take 75 mg by mouth every day.        Cranberry   400 mg by Does not apply route 2 Times a Day.        CycloSPORINE (Emulsion) RESTASIS 0.05 % Place 1 Drop in both eyes 2 times a day.        Docusate Calcium   Take  by mouth 2 Times a Day.        Docusate Sodium (Cap) COLACE 100 MG Take 100 mg by mouth 2 times a day.        DULoxetine HCl (Cap DR Particles)  CYMBALTA 30 MG Take 30 mg by mouth every day.        Esomeprazole Magnesium (CAPSULE DELAYED RELEASE) NEXIUM 40 MG Take 40 mg by mouth every morning before breakfast.        Esomeprazole Magnesium (Pack) NEXIUM 40 MG Take 40 mg by mouth every morning before breakfast.        Fluorometholone (Suspension) FML 0.1 % Place 1 Drop in both eyes every 4 hours.        Ibuprofen (Tab) MOTRIN 200 MG Take 200 mg by mouth every 6 hours as needed.        Latanoprost (Solution) XALATAN 0.005 % Place 1 Drop in both eyes every evening.        LORazepam (Tab) ATIVAN 2 MG Take 2 mg by mouth at bedtime as needed. bedtime        LORazepam (Tab) ATIVAN 0.5 MG Take 0.5 mg by mouth every four hours as needed for Anxiety.        Lubiprostone (Cap) AMITIZA 8 MCG Take  by mouth 2 Times a Day.        Metoprolol Tartrate (Tab) LOPRESSOR 25 MG Take 1 Tab by mouth 2 times a day.        Mirtazapine (TABLET DISPERSIBLE) REMERON 30 MG Take 30 mg by mouth every evening.        Nitrofurantoin Monohyd Macro (Cap) MACROBID 100 MG Take 1 Cap by mouth 2 times a day.        Nitroglycerin (SL Tab) NITROSTAT 0.4 MG Place 1 Tab under tongue as needed for Chest Pain.        Ondansetron (TABLET DISPERSIBLE) ZOFRAN ODT 4 MG Take 1 Tab by mouth every 8 hours as needed for Nausea/Vomiting.        Oxycodone-Acetaminophen (Tab) PERCOCET 5-325 MG Take 1-2 Tabs by mouth every four hours as needed.        Polyethylene Glycol 3350 (Powder) MIRALAX  Take 17 g by mouth every day.        PredniSONE (Tab) DELTASONE 20 MG Take one tablet every 6 hours starting at 1pm on 7/10; ending at 7 am on 7/11        .                 Medicines prescribed today were sent to:     [id number] [place] - [address]    [address] [place] [address]    Phone: [phone] Fax: [phone]    Open 24 Hours?: [facility] [place] - [address]    [address] [facility] [address]    Phone: [phone] Fax: [phone]     Open 24 Hours?: No    [facility] - [place], [place] - [address]    [address] [place] [address]    Phone: [phone] Fax: [phone]    Open 24 Hours?: No      Medication refill instructions:       If your prescription bottle indicates you have medication refills left, it is not necessary to call your provider’s office. Please contact your pharmacy and they will refill your medication.    If your prescription bottle indicates you do not have any refills left, you may request refills at any time through one of the following ways: The online [employer] system (except Urgent Care), by calling your provider’s office, or by asking your pharmacy to contact your provider’s office with a refill request. Medication refills are processed only during regular business hours and may not be available until the next business day. Your provider may request additional information or to have a follow-up visit with you prior to refilling your medication.   *Please Note: Medication refills are assigned a new Rx number when refilled electronically. Your pharmacy may indicate that no refills were authorized even though a new prescription for the same medication is available at the pharmacy. Please request the medicine by name with the pharmacy before contacting your provider for a refill.        Your To Do List     Future Labs/Procedures Complete By Expires    DX-CHEST-2 VIEWS  As directed 7/20/2018         [employer] Access Code: [email or web address]  Expires: 8/19/2017  5:36 PM    [employer]  A secure, online tool to manage your health information     [employer]’s [employer]® is a secure, online tool that connects you to your personalized health information from the privacy of your home -- day or night - making it very easy for you to manage your healthcare. Once the activation process is completed, you can even access your medical information using the [employer] neeraj, which is available for free in the Apple Neeraj store or Google Play  store.     [employer] provides the following levels of access (as shown below):   My Chart Features   [facility] Doctor [facility]  Specialists [facility]  Urgent  Care Non-[facility]  Primary Care  Doctor   Email your healthcare team securely and privately 24/7 X X X    Manage appointments: schedule your next appointment; view details of past/upcoming appointments X      Request prescription refills. X      View recent personal medical records, including lab and immunizations X X X X   View health record, including health history, allergies, medications X X X X   Read reports about your outpatient visits, procedures, consult and ER notes X X X X   See your discharge summary, which is a recap of your hospital and/or ER visit that includes your diagnosis, lab results, and care plan. X X       How to register for [employer]:  1. Go to  [email or web address].  2. Click on the Sign Up Now box, which takes you to the New Member Sign Up page. You will need to provide the following information:  a. Enter your [employer] Access Code exactly as it appears at the top of this page. (You will not need to use this code after you’ve completed the sign-up process. If you do not sign up before the expiration date, you must request a new code.)   b. Enter your date of birth.   c. Enter your home email address.   d. Click Submit, and follow the next screen’s instructions.  3. Create a [employer] ID. This will be your [employer] login ID and cannot be changed, so think of one that is secure and easy to remember.  4. Create a [employer] password. You can change your password at any time.  5. Enter your Password Reset Question and Answer. This can be used at a later time if you forget your password.   6. Enter your e-mail address. This allows you to receive e-mail notifications when new information is available in [employer].  7. Click Sign Up. You can now view your health information.    For assistance activating your [employer] account, call [phone]
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
## 2017-08-14 ENCOUNTER — OFFICE VISIT (OUTPATIENT)
Dept: URGENT CARE | Facility: PHYSICIAN GROUP | Age: 82
End: 2017-08-14
Payer: MEDICARE

## 2017-08-14 VITALS
TEMPERATURE: 98.8 F | SYSTOLIC BLOOD PRESSURE: 120 MMHG | HEART RATE: 100 BPM | DIASTOLIC BLOOD PRESSURE: 70 MMHG | OXYGEN SATURATION: 95 % | RESPIRATION RATE: 20 BRPM | BODY MASS INDEX: 25.76 KG/M2 | WEIGHT: 140 LBS | HEIGHT: 62 IN

## 2017-08-14 DIAGNOSIS — L03.115 CELLULITIS OF FOOT, RIGHT: ICD-10-CM

## 2017-08-14 PROCEDURE — 99214 OFFICE O/P EST MOD 30 MIN: CPT | Mod: 25 | Performed by: PHYSICIAN ASSISTANT

## 2017-08-14 PROCEDURE — 96372 THER/PROPH/DIAG INJ SC/IM: CPT | Performed by: PHYSICIAN ASSISTANT

## 2017-08-14 RX ORDER — ESTRADIOL 10 UG/1
10 INSERT VAGINAL DAILY
COMMUNITY
End: 2017-08-17

## 2017-08-14 RX ORDER — CEFTRIAXONE 1 G/1
1 INJECTION, POWDER, FOR SOLUTION INTRAMUSCULAR; INTRAVENOUS ONCE
Status: COMPLETED | OUTPATIENT
Start: 2017-08-14 | End: 2017-08-14

## 2017-08-14 RX ORDER — SULFAMETHOXAZOLE AND TRIMETHOPRIM 800; 160 MG/1; MG/1
1 TABLET ORAL 2 TIMES DAILY
Qty: 20 TAB | Refills: 0 | Status: SHIPPED | OUTPATIENT
Start: 2017-08-14 | End: 2019-05-20

## 2017-08-14 RX ADMIN — CEFTRIAXONE 1 G: 1 INJECTION, POWDER, FOR SOLUTION INTRAMUSCULAR; INTRAVENOUS at 13:12

## 2017-08-14 ASSESSMENT — ENCOUNTER SYMPTOMS
CARDIOVASCULAR NEGATIVE: 1
RESPIRATORY NEGATIVE: 1
VOMITING: 0
MYALGIAS: 0
CONSTITUTIONAL NEGATIVE: 1
NEUROLOGICAL NEGATIVE: 1
HEADACHES: 0
NAUSEA: 0
BRUISES/BLEEDS EASILY: 1

## 2017-08-14 ASSESSMENT — PAIN SCALES - GENERAL: PAINLEVEL: 10=SEVERE PAIN

## 2017-08-14 NOTE — PROGRESS NOTES
"Subjective:      Navya Mcbride is a 82 y.o. female who presents with Spider Bite        HPI   Patient presents with approx 4 days of right foot swelling, pain.   Here with daughter.    Patient had what was suspected to be a bite on the top of her right foot but has become increasingly painful and more swollen.  Her daughter notes she has been tearing up due to the pain and not wanting to walk on it.  Daughter is RN and states she specifically has not seen any streaking or drainage.  Daughter denies hx of frequent infections and denies diabetes.  No numbness or tingling per patient.  No fevers or chills but states \"fever in the foot\"    Review of Systems   Constitutional: Negative.    Respiratory: Negative.    Cardiovascular: Negative.    Gastrointestinal: Negative for nausea and vomiting.   Musculoskeletal: Negative for myalgias and joint pain.   Skin: Positive for rash. Negative for itching.   Neurological: Negative.  Negative for headaches.   Endo/Heme/Allergies: Bruises/bleeds easily (on anti-coags).   All other systems reviewed and are negative.       PMH:  has a past medical history of Colitis; Gastritis; Fibromyalgia; Coarse tremors; Heart burn; Breath shortness; Indigestion; Dental disorder; Hiatus hernia syndrome; CATARACT; Pain; Urinary bladder disorder; Osteoporosis; Pneumonia; Backpain; Anemia; Pulmonary embolism (CMS-HCC); Other specified disorder of intestines; Asthma; Psychiatric problem; Clostridium difficile infection (2009); Stroke (CMS-HCC) (2009); Anesthesia; Myocardial infarct (CMS-HCC) (5/2015); and Arthritis.  MEDS:   Current outpatient prescriptions:   •  Diclofenac Sodium 1 % Gel, Apply  to skin as directed., Disp: , Rfl:   •  estradiol (VAGIFEM) 10 MCG Tab, Insert 10 mcg in vagina every day., Disp: , Rfl:   •  sulfamethoxazole-trimethoprim (BACTRIM DS) 800-160 MG tablet, Take 1 Tab by mouth 2 times a day., Disp: 20 Tab, Rfl: 0  •  lorazepam (ATIVAN) 0.5 MG Tab, Take 0.5 mg by " mouth every four hours as needed for Anxiety., Disp: , Rfl:   •  celecoxib (CELEBREX) 200 MG Cap, Take 200 mg by mouth 2 times a day., Disp: , Rfl:   •  atorvastatin (LIPITOR) 40 MG TABS, Take 40 mg by mouth every evening., Disp: , Rfl:   •  duloxetine (CYMBALTA) 30 MG CPEP, Take 30 mg by mouth every day., Disp: , Rfl:   •  esomeprazole (NEXIUM) 40 MG delayed-release capsule, Take 40 mg by mouth every morning before breakfast., Disp: , Rfl:   •  mirtazapine (REMERON SOLTAB) 30 MG TBDP, Take 30 mg by mouth every evening., Disp: , Rfl:   •  clopidogrel (PLAVIX) 75 MG TABS, Take 75 mg by mouth every day., Disp: , Rfl:   •  aspirin 81 MG tablet, Take 81 mg by mouth every day., Disp: , Rfl:   •  ibuprofen (ADVIL) 200 MG TABS, Take 200 mg by mouth every 6 hours as needed., Disp: , Rfl:   •  docusate sodium (COLACE) 100 MG CAPS, Take 100 mg by mouth 2 times a day., Disp: , Rfl:   •  latanoprost (XALATAN) 0.005 % SOLN, Place 1 Drop in both eyes every evening., Disp: , Rfl:   •  cyclosporin (RESTASIS) 0.05 % ophthalmic emulsion, Place 1 Drop in both eyes 2 times a day., Disp: , Rfl:   •  fluorometholone (FML) 0.1 % SUSP, Place 1 Drop in both eyes every 4 hours., Disp: , Rfl:   •  Budesonide-Formoterol Fumarate (SYMBICORT INH), Inhale  by mouth., Disp: , Rfl:   •  metoprolol (LOPRESSOR) 25 MG TABS, Take 1 Tab by mouth 2 times a day., Disp: 60 Tab, Rfl: 3  •  oxycodone-acetaminophen (PERCOCET) 5-325 MG TABS, Take 1-2 Tabs by mouth every four hours as needed., Disp: , Rfl:   •  ALBUTEROL INH, Inhale 1-2 Puffs by mouth every four hours as needed. For shortness of breath , Disp: , Rfl:   •  polyethylene glycol 3350 (MIRALAX) POWD, Take 17 g by mouth every day., Disp: , Rfl:   •  Cholecalciferol, [id number], (VITAMIN D3), every day. Two tsp, Disp: , Rfl:   •  CRANBERRY, 400 mg by Does not apply route 2 Times a Day., Disp: , Rfl:   •  AMITIZA 8 MCG PO CAPS, Take  by mouth 2 Times a Day., Disp: , Rfl:   •  ATIVAN 2 MG PO TABS, Take  "2 mg by mouth at bedtime as needed. bedtime, Disp: , Rfl:   •  nitroglycerin (NITROSTAT) 0.4 MG SUBL, Place 1 Tab under tongue as needed for Chest Pain., Disp: 25 Tab, Rfl: 1  •  AMITRIPTYLINE HCL 25 MG PO TABS, Take 75 mg by mouth at bedtime as needed., Disp: , Rfl:   •  STOOL SOFTENER PO, Take  by mouth 2 Times a Day., Disp: , Rfl:   ALLERGIES:   Allergies   Allergen Reactions   • Iodine Hives     dyspnea   • Tequin [Gatifloxacin Sesquihydrate] Hives     Severe rash and SOB     SURGHX:   Past Surgical History   Procedure Laterality Date   • Sabrina by laparoscopy  4/19/2009     Performed by GANSER, JOHN H at SURGERY [facility] ORS   • Rotator cuff repair  2000     left   • Hysterectomy, total abdominal  1973   • Knee arthroscopy  10/8/2009     Performed by JAYSHREE VIVEROS at SURGERY [facility] ORS   • Medial meniscectomy  10/8/2009     Performed by JAYSHREE VIVEROS at [facility]   • Meniscectomy  10/8/2009     Performed by JAYSHREE VIVEROS at SURGERY [facility] ORS   • Recovery  6/11/2015     Procedure:  CATH LAB  [facility] WITH POSS. SILVINO ICD; 794.30;  Surgeon: Maryly Surgery;  Location: SURGERY PRE-POST PROC UNIT [facility];  Service:      SOCHX:  reports that she has never smoked. She has never used smokeless tobacco. She reports that she does not drink alcohol or use illicit drugs.  FH: Family history was reviewed, no pertinent findings to report     Objective:     /70 mmHg  Pulse 100  Temp(Src) 37.1 °C (98.8 °F)  Resp 20  Ht 1.575 m (5' 2.01\")  Wt 63.504 kg (140 lb)  BMI 25.60 kg/m2  SpO2 95%  Breastfeeding? No     Physical Exam   Constitutional: She is oriented to person, place, and time. She appears well-developed and well-nourished. No distress.   Eyes: Conjunctivae are normal. Pupils are equal, round, and reactive to light.   Neck: Normal range of motion. Neck supple.   Cardiovascular: Normal rate and regular rhythm.    Pulmonary/Chest: Effort normal and breath sounds normal. "   Musculoskeletal:        Feet:    Neurological: She is alert and oriented to person, place, and time.   Skin: Skin is warm and dry.   Psychiatric: She has a normal mood and affect. Her behavior is normal.   Vitals reviewed.            Assessment/Plan:     1. Cellulitis of foot, right  cefTRIAXone (ROCEPHIN) injection 1 g    sulfamethoxazole-trimethoprim (BACTRIM DS) 800-160 MG tablet       -Rocephin given in clinic. Patient tolerated well and was monitored for 15 mins s/p shot  -she will follow up with Bactrim.  Previous GFRs have all been above 60.  She has hx of C diff.  Advise probiotics.   -elevate foot.  Monitor erythema closely.   PCP follow up by end of the week and RTC precautions in meantime advised    Supportive care, differential diagnoses, and indications for immediate follow-up discussed with patient and her daughter.   Pathogenesis of diagnosis discussed including typical length and natural progression.   Instructed to return to clinic or nearest emergency department for any change in condition, further concerns, or worsening of symptoms.  Patient and daughter states understanding of the plan of care and discharge instructions.  Instructed to make an appointment, for follow up, with their primary care provider.      Rosa Mcbride PA-C

## 2017-08-14 NOTE — MR AVS SNAPSHOT
"        Navya Damico Reed   2017 11:50 AM   Office Visit   MRN: [id number]    Department:  [facility]   Dept Phone:  [phone]    Description:  Female : 1935   Provider:  Rosa Mcbride PA-C           Reason for Visit     Spider Bite x2 days. Swollen, red, painful to touch.      Allergies as of 2017     Allergen Noted Reactions    Iodine 2009   Hives    dyspnea    Tequin [Gatifloxacin Sesquihydrate] 10/07/2009   Hives    Severe rash and SOB      You were diagnosed with     Cellulitis of foot, right   [862308]         Vital Signs     Blood Pressure Pulse Temperature Respirations Height Weight    120/70 mmHg 100 37.1 °C (98.8 °F) 20 1.575 m (5' 2.01\") 63.504 kg (140 lb)    Body Mass Index Oxygen Saturation Breastfeeding? Smoking Status          25.60 kg/m2 95% No Never Smoker         Basic Information     Date Of Birth Sex Race Ethnicity Preferred Language    1935 Female White  Origin (Wolof,Barbadian,Barbadian,Nicolas, etc) English      Problem List              ICD-10-CM Priority Class Noted - Resolved    Other chest pain R07.89   2015 - Present    Abnormal myocardial perfusion study    2015 - Present    H/O: stroke Z86.73   2015 - Present    Memory loss R41.3   2015 - Present    Fibromyalgia M79.7   6/3/2015 - Present    H/O arthroscopic knee surgery Z98.890   Unknown - Present    Pulmonary embolism (CMS-HCC) I26.99   Unknown - Present    GERD (gastroesophageal reflux disease) K21.9   6/3/2015 - Present    Colitis K52.9   Unknown - Present    Glaucoma H40.9   6/3/2015 - Present    Allergy to iodine Z88.8   6/3/2015 - Present      Health Maintenance        Date Due Completion Dates    IMM DTaP/Tdap/Td Vaccine (1 - Tdap) 1955 ---    PAP SMEAR 1957 ---    MAMMOGRAM 1976 ---    COLONOSCOPY 1986 ---    IMM ZOSTER VACCINE 1996 ---    IMM PNEUMOCOCCAL 65+ (ADULT) LOW/MEDIUM RISK SERIES (2 of 2 - PCV13) 10/27/2010 10/27/2009   " BONE DENSITY 8/10/2012 8/10/2007    IMM INFLUENZA (1) 9/1/2017 10/27/2009            Current Immunizations     Influenza TIV (IM) 10/27/2009  1:30 PM    Pneumococcal polysaccharide vaccine (PPSV-23) 10/27/2009  1:30 PM      Below and/or attached are the medications your provider expects you to take. Review all of your home medications and newly ordered medications with your provider and/or pharmacist. Follow medication instructions as directed by your provider and/or pharmacist. Please keep your medication list with you and share with your provider. Update the information when medications are discontinued, doses are changed, or new medications (including over-the-counter products) are added; and carry medication information at all times in the event of emergency situations     Allergies:  IODINE - Hives     TEQUIN - Hives               Medications  Valid as of: August 14, 2017 -  1:04 PM    Generic Name Brand Name Tablet Size Instructions for use    Albuterol   Inhale 1-2 Puffs by mouth every four hours as needed. For shortness of breath         Amitriptyline HCl (Tab) ELAVIL 25 MG Take 75 mg by mouth at bedtime as needed.        Aspirin (Tab) aspirin 81 MG Take 81 mg by mouth every day.        Atorvastatin Calcium (Tab) LIPITOR 40 MG Take 40 mg by mouth every evening.        Budesonide-Formoterol Fumarate   Inhale  by mouth.        Celecoxib (Cap) CELEBREX 200 MG Take 200 mg by mouth 2 times a day.        Cholecalciferol   every day. Two tsp        Clopidogrel Bisulfate (Tab) PLAVIX 75 MG Take 75 mg by mouth every day.        Cranberry   400 mg by Does not apply route 2 Times a Day.        CycloSPORINE (Emulsion) RESTASIS 0.05 % Place 1 Drop in both eyes 2 times a day.        Diclofenac Sodium (Gel) Diclofenac Sodium 1 % Apply  to skin as directed.        Docusate Calcium   Take  by mouth 2 Times a Day.        Docusate Sodium (Cap) COLACE 100 MG Take 100 mg by mouth 2 times a day.        DULoxetine HCl (Cap DR  Particles) CYMBALTA 30 MG Take 30 mg by mouth every day.        Esomeprazole Magnesium (CAPSULE DELAYED RELEASE) NEXIUM 40 MG Take 40 mg by mouth every morning before breakfast.        Estradiol (Tab) VAGIFEM 10 MCG Insert 10 mcg in vagina every day.        Fluorometholone (Suspension) FML 0.1 % Place 1 Drop in both eyes every 4 hours.        Ibuprofen (Tab) MOTRIN 200 MG Take 200 mg by mouth every 6 hours as needed.        Latanoprost (Solution) XALATAN 0.005 % Place 1 Drop in both eyes every evening.        LORazepam (Tab) ATIVAN 2 MG Take 2 mg by mouth at bedtime as needed. bedtime        LORazepam (Tab) ATIVAN 0.5 MG Take 0.5 mg by mouth every four hours as needed for Anxiety.        Lubiprostone (Cap) AMITIZA 8 MCG Take  by mouth 2 Times a Day.        Metoprolol Tartrate (Tab) LOPRESSOR 25 MG Take 1 Tab by mouth 2 times a day.        Mirtazapine (TABLET DISPERSIBLE) REMERON 30 MG Take 30 mg by mouth every evening.        Nitroglycerin (SL Tab) NITROSTAT 0.4 MG Place 1 Tab under tongue as needed for Chest Pain.        Oxycodone-Acetaminophen (Tab) PERCOCET 5-325 MG Take 1-2 Tabs by mouth every four hours as needed.        Polyethylene Glycol 3350 (Powder) MIRALAX  Take 17 g by mouth every day.        Sulfamethoxazole-Trimethoprim (Tab) BACTRIM -160 MG Take 1 Tab by mouth 2 times a day.        .                 Medicines prescribed today were sent to:     [id number] - [place], [place] - [address]    [address] [facility] [address]    Phone: [phone] Fax: [phone]    Open 24 Hours?: No    [facility] [place] [place] - [address]    [address] [facility] [address]    Phone: [phone] Fax: [phone]    Open 24 Hours?: No    [facility] - [place], [place] - [address] [facility] [address]    Phone: [phone] Fax: [phone]    Open 24 Hours?: No      Medication refill instructions:       If your prescription bottle indicates  you have medication refills left, it is not necessary to call your provider’s office. Please contact your pharmacy and they will refill your medication.    If your prescription bottle indicates you do not have any refills left, you may request refills at any time through one of the following ways: The online [employer] system (except Urgent Care), by calling your provider’s office, or by asking your pharmacy to contact your provider’s office with a refill request. Medication refills are processed only during regular business hours and may not be available until the next business day. Your provider may request additional information or to have a follow-up visit with you prior to refilling your medication.   *Please Note: Medication refills are assigned a new Rx number when refilled electronically. Your pharmacy may indicate that no refills were authorized even though a new prescription for the same medication is available at the pharmacy. Please request the medicine by name with the pharmacy before contacting your provider for a refill.           Secoo Access Code: [email or web address]  Expires: 8/19/2017  5:36 PM    Secoo  A secure, online tool to manage your health information     [employer]’s Secoo® is a secure, online tool that connects you to your personalized health information from the privacy of your home -- day or night - making it very easy for you to manage your healthcare. Once the activation process is completed, you can even access your medical information using the Secoo neeraj, which is available for free in the Apple Neeraj store or Google Play store.     Secoo provides the following levels of access (as shown below):   My Chart Features   [facility] Doctor [facility]  Specialists [facility]  Urgent  Care [facility]  Primary Care  Doctor   Email your healthcare team securely and privately 24/7 X X X    Manage appointments: schedule your next appointment; view details of past/upcoming appointments X       Request prescription refills. X      View recent personal medical records, including lab and immunizations X X X X   View health record, including health history, allergies, medications X X X X   Read reports about your outpatient visits, procedures, consult and ER notes X X X X   See your discharge summary, which is a recap of your hospital and/or ER visit that includes your diagnosis, lab results, and care plan. X X       How to register for [employer]:  1. Go to  [email or web address].  2. Click on the Sign Up Now box, which takes you to the New Member Sign Up page. You will need to provide the following information:  a. Enter your [employer] Access Code exactly as it appears at the top of this page. (You will not need to use this code after you’ve completed the sign-up process. If you do not sign up before the expiration date, you must request a new code.)   b. Enter your date of birth.   c. Enter your home email address.   d. Click Submit, and follow the next screen’s instructions.  3. Create a [employer] ID. This will be your [employer] login ID and cannot be changed, so think of one that is secure and easy to remember.  4. Create a [employer] password. You can change your password at any time.  5. Enter your Password Reset Question and Answer. This can be used at a later time if you forget your password.   6. Enter your e-mail address. This allows you to receive e-mail notifications when new information is available in [employer].  7. Click Sign Up. You can now view your health information.    For assistance activating your [employer] account, call [phone]

## 2017-08-16 ENCOUNTER — HOSPITAL ENCOUNTER (INPATIENT)
Facility: MEDICAL CENTER | Age: 82
LOS: 1 days | DRG: 603 | End: 2017-08-18
Attending: EMERGENCY MEDICINE | Admitting: INTERNAL MEDICINE
Payer: MEDICARE

## 2017-08-16 DIAGNOSIS — L03.115 CELLULITIS OF RIGHT LOWER EXTREMITY: ICD-10-CM

## 2017-08-16 LAB
ALBUMIN SERPL BCP-MCNC: 3.6 G/DL (ref 3.2–4.9)
ALBUMIN/GLOB SERPL: 1.3 G/DL
ALP SERPL-CCNC: 77 U/L (ref 30–99)
ALT SERPL-CCNC: 8 U/L (ref 2–50)
ANION GAP SERPL CALC-SCNC: 7 MMOL/L (ref 0–11.9)
AST SERPL-CCNC: 15 U/L (ref 12–45)
BASOPHILS # BLD AUTO: 0.3 % (ref 0–1.8)
BASOPHILS # BLD: 0.02 K/UL (ref 0–0.12)
BILIRUB SERPL-MCNC: 0.2 MG/DL (ref 0.1–1.5)
BUN SERPL-MCNC: 19 MG/DL (ref 8–22)
CALCIUM SERPL-MCNC: 9.2 MG/DL (ref 8.5–10.5)
CHLORIDE SERPL-SCNC: 105 MMOL/L (ref 96–112)
CO2 SERPL-SCNC: 25 MMOL/L (ref 20–33)
CREAT SERPL-MCNC: 0.88 MG/DL (ref 0.5–1.4)
CRP SERPL HS-MCNC: 5.23 MG/DL (ref 0–0.75)
EOSINOPHIL # BLD AUTO: 0.11 K/UL (ref 0–0.51)
EOSINOPHIL NFR BLD: 1.5 % (ref 0–6.9)
ERYTHROCYTE [DISTWIDTH] IN BLOOD BY AUTOMATED COUNT: 43.8 FL (ref 35.9–50)
GFR SERPL CREATININE-BSD FRML MDRD: >60 ML/MIN/1.73 M 2
GLOBULIN SER CALC-MCNC: 2.7 G/DL (ref 1.9–3.5)
GLUCOSE SERPL-MCNC: 91 MG/DL (ref 65–99)
HCT VFR BLD AUTO: 36.8 % (ref 37–47)
HGB BLD-MCNC: 12.1 G/DL (ref 12–16)
IMM GRANULOCYTES # BLD AUTO: 0.01 K/UL (ref 0–0.11)
IMM GRANULOCYTES NFR BLD AUTO: 0.1 % (ref 0–0.9)
LYMPHOCYTES # BLD AUTO: 2.18 K/UL (ref 1–4.8)
LYMPHOCYTES NFR BLD: 29.3 % (ref 22–41)
MCH RBC QN AUTO: 29.6 PG (ref 27–33)
MCHC RBC AUTO-ENTMCNC: 32.9 G/DL (ref 33.6–35)
MCV RBC AUTO: 90 FL (ref 81.4–97.8)
MONOCYTES # BLD AUTO: 0.72 K/UL (ref 0–0.85)
MONOCYTES NFR BLD AUTO: 9.7 % (ref 0–13.4)
NEUTROPHILS # BLD AUTO: 4.41 K/UL (ref 2–7.15)
NEUTROPHILS NFR BLD: 59.1 % (ref 44–72)
NRBC # BLD AUTO: 0 K/UL
NRBC BLD AUTO-RTO: 0 /100 WBC
PLATELET # BLD AUTO: 229 K/UL (ref 164–446)
PMV BLD AUTO: 10.4 FL (ref 9–12.9)
POTASSIUM SERPL-SCNC: 4.4 MMOL/L (ref 3.6–5.5)
PROT SERPL-MCNC: 6.3 G/DL (ref 6–8.2)
RBC # BLD AUTO: 4.09 M/UL (ref 4.2–5.4)
SODIUM SERPL-SCNC: 137 MMOL/L (ref 135–145)
WBC # BLD AUTO: 7.5 K/UL (ref 4.8–10.8)

## 2017-08-16 PROCEDURE — 94760 N-INVAS EAR/PLS OXIMETRY 1: CPT

## 2017-08-16 PROCEDURE — 36415 COLL VENOUS BLD VENIPUNCTURE: CPT

## 2017-08-16 PROCEDURE — 96365 THER/PROPH/DIAG IV INF INIT: CPT

## 2017-08-16 PROCEDURE — 86140 C-REACTIVE PROTEIN: CPT

## 2017-08-16 PROCEDURE — 87040 BLOOD CULTURE FOR BACTERIA: CPT | Mod: 91

## 2017-08-16 PROCEDURE — 96375 TX/PRO/DX INJ NEW DRUG ADDON: CPT

## 2017-08-16 PROCEDURE — 80053 COMPREHEN METABOLIC PANEL: CPT

## 2017-08-16 PROCEDURE — 85025 COMPLETE CBC W/AUTO DIFF WBC: CPT

## 2017-08-16 PROCEDURE — 700105 HCHG RX REV CODE 258: Performed by: EMERGENCY MEDICINE

## 2017-08-16 PROCEDURE — 700111 HCHG RX REV CODE 636 W/ 250 OVERRIDE (IP): Performed by: EMERGENCY MEDICINE

## 2017-08-16 PROCEDURE — 99285 EMERGENCY DEPT VISIT HI MDM: CPT

## 2017-08-16 RX ORDER — CLINDAMYCIN PHOSPHATE 900 MG/50ML
900 INJECTION, SOLUTION INTRAVENOUS ONCE
Status: DISCONTINUED | OUTPATIENT
Start: 2017-08-16 | End: 2017-08-16

## 2017-08-16 RX ORDER — CLINDAMYCIN HYDROCHLORIDE 150 MG/1
300 CAPSULE ORAL 3 TIMES DAILY
Qty: 40 CAP | Refills: 0 | Status: SHIPPED | OUTPATIENT
Start: 2017-08-16 | End: 2017-08-16

## 2017-08-16 RX ORDER — DEXTROSE MONOHYDRATE 50 MG/ML
INJECTION, SOLUTION INTRAVENOUS CONTINUOUS
Status: DISCONTINUED | OUTPATIENT
Start: 2017-08-16 | End: 2017-08-18 | Stop reason: HOSPADM

## 2017-08-16 RX ADMIN — ORITAVANCIN 1200 MG: 400 INJECTION, POWDER, LYOPHILIZED, FOR SOLUTION INTRAVENOUS at 23:53

## 2017-08-16 RX ADMIN — DEXTROSE MONOHYDRATE 20 ML: 50 INJECTION, SOLUTION INTRAVENOUS at 23:53

## 2017-08-16 NOTE — LETTER
[facility] - [facility]  EHR eReferral Notification Requirements    To be sent by secure email to [email or web address] or   by fax to [phone]       FIELDS ARE REQUIRED TO BE COMPLETED     Patient Name:  Navya Mcbride  MRN:  [id number]   Account Number: [id number]                YOB: 1935    Date Roomed in ED:    8/16/2017   10:09 PM  Date First Observation Order Placed: 8/17/2017   4:40 AM  Date First Inpatient Order Placed: 8/17/2017   9:34 AM    Date of Previous Admission (Needed For Readmission Reviews Only):     Discharge Date and Time (if applicable): No discharge date for patient encounter.     PLEASE CHECK OFF TYPE OF REVIEW & CURRENT ADMISSION STATUS FROM LISTS BELOW  Type of Review:  Admission review  8/17/2017    Dates to be Reviewed: ***        Current Admission Status:  Inpatient    / Contact Number for EHR outcome/recommendation call:    Mireya Saucedo RN   [phone]     Attending Physician/ Contact Number (if not the same as in electronic record):  Dr. Kemal Urbina  [phone]     Comments:  Please call Dr. Urbina , he feels pt should be obs.  Thanks      Additional Information being Emailed or Faxed:  {YES/NO:63}       Fax Handwritten Supporting Documents to EHR at [phone]      [address] [address]  [phone]  [email or web address]    Updated December 17, 2014

## 2017-08-16 NOTE — IP AVS SNAPSHOT
FreshT Access Code: [email or web address]  Expires: 8/19/2017  5:36 PM    Your email address is not on file at [facility].  Email Addresses are required for you to sign up for FreshT, please contact [phone] to verify your personal information and to provide your email address prior to attempting to register for FreshT.    Navya Mcbride  [address] [place] [address]    InToTallyt  A secure, online tool to manage your health information     [facility]’s FreshT® is a secure, online tool that connects you to your personalized health information from the privacy of your home -- day or night - making it very easy for you to manage your healthcare. Once the activation process is completed, you can even access your medical information using the FreshT neeraj, which is available for free in the Apple Neeraj store or Google Play store.     To learn more about FreshT, visit [email or web address]    There are two levels of access available (as shown below):   My Chart Features  [facility] Primary Care Doctor [facility]  Specialists [facility]  Urgent  Care Non-[facility] Primary Care Doctor   Email your healthcare team securely and privately 24/7 X X X    Manage appointments: schedule your next appointment; view details of past/upcoming appointments X      Request prescription refills. X      View recent personal medical records, including lab and immunizations X X X X   View health record, including health history, allergies, medications X X X X   Read reports about your outpatient visits, procedures, consult and ER notes X X X X   See your discharge summary, which is a recap of your hospital and/or ER visit that includes your diagnosis, lab results, and care plan X X  X     How to register for FreshT:  Once your e-mail address has been verified, follow the following steps to sign up for InToTallyt.     1. Go to  [email or web address]  2. Click on the Sign Up Now box, which takes you to the New Member Sign Up page.  You will need to provide the following information:  a. Enter your CommuniC[employer] Access Code exactly as it appears at the top of this page. (You will not need to use this code after you’ve completed the sign-up process. If you do not sign up before the expiration date, you must request a new code.)   b. Enter your date of birth.   c. Enter your home email address.   d. Click Submit, and follow the next screen’s instructions.  3. Create a WiseBanyant ID. This will be your CommuniClique login ID and cannot be changed, so think of one that is secure and easy to remember.  4. Create a CommuniClique password. You can change your password at any time.  5. Enter your Password Reset Question and Answer. This can be used at a later time if you forget your password.   6. Enter your e-mail address. This allows you to receive e-mail notifications when new information is available in CommuniClique.  7. Click Sign Up. You can now view your health information.    For assistance activating your CommuniClique account, call [phone]

## 2017-08-16 NOTE — LETTER
[facility] - [phone]  EHR eReferral Notification Requirements    To be sent by secure email to [email or web address] or   by fax to [phone]       FIELDS ARE REQUIRED TO BE COMPLETED     Patient Name:  Navya Mcbride  MRN:  [id number]   Account Number: [id number]                YOB: 1935    Date Roomed in ED:    8/16/2017   10:09 PM  Date First Observation Order Placed: 8/17/2017   4:40 AM  Date First Inpatient Order Placed: 8/17/2017   9:34 AM    Date of Previous Admission (Needed For Readmission Reviews Only):     Discharge Date and Time (if applicable): No discharge date for patient encounter.     PLEASE CHECK OFF TYPE OF REVIEW & CURRENT ADMISSION STATUS FROM LISTS BELOW  Type of Review:  Admission review 8/17/2017    Dates to be Reviewed: ***        Current Admission Status:  Inpatient    / Contact Number for EHR outcome/recommendation call:    Mireya Saucedo RN   [phone]     Attending Physician/ Contact Number (if not the same as in electronic record):  Dr. Kemal Urbina  [phone]  Please call asap     Comments:  ***      Additional Information being Emailed or Faxed:  {YES/NO:63}       Fax Handwritten Supporting Documents to EHR at [phone]      [address] [address]  [phone]  [email or web address]    Updated December 17, 2014

## 2017-08-16 NOTE — IP AVS SNAPSHOT
8/18/2017    Navya Mcbride  [address] [place] [address]    Dear Navya:    [facility] wants to ensure your discharge home is safe and you or your loved ones have had all of your questions answered regarding your care after you leave the hospital.    Below is a list of resources and contact information should you have any questions regarding your hospital stay, follow-up instructions, or active medical symptoms.    Questions or Concerns Regarding… Contact   Medical Questions Related to Your Discharge  (7 days a week, 8am-5pm) Contact a Nurse Care Coordinator   [phone]   Medical Questions Not Related to Your Discharge  (24 hours a day / 7 days a week)  Contact the Nurse Health Line   [phone]    Medications or Discharge Instructions Refer to your discharge packet   or contact your [facility] Primary Care Provider   [phone]   Follow-up Appointment(s) Schedule your appointment via Niche   or contact Scheduling [phone]   Billing Review your statement via Niche  or contact Billing [phone]   Medical Records Review your records via Niche   or contact Medical Records [phone]     You may receive a telephone call within two days of discharge. This call is to make certain you understand your discharge instructions and have the opportunity to have any questions answered. You can also easily access your medical information, test results and upcoming appointments via the Niche free online health management tool. You can learn more and sign up at Team Robot/Niche. For assistance setting up your Niche account, please call [phone].    Once again, we want to ensure your discharge home is safe and that you have a clear understanding of any next steps in your care. If you have any questions or concerns, please do not hesitate to contact us, we are here for you. Thank you for choosing [facility] for your healthcare needs.    Sincerely,    Your [facility] Healthcare Team

## 2017-08-16 NOTE — IP AVS SNAPSHOT
" Home Care Instructions                                                                                                                  Name:Navya Mcbride  Medical Record Number:[id number]  CSN: [id number]    YOB: 1935   Age: 82 y.o.  Sex: female  HT:1.626 m (5' 4\") WT: 63.594 kg (140 lb 3.2 oz)          Admit Date: 8/16/2017     Discharge Date:   Today's Date: 8/18/2017  Attending Doctor:  Kemal Urbina M.D.                  Allergies:  Iodine and Tequin            Discharge Instructions       Discharge Instructions    Discharged to home by car with relative. Discharged via wheelchair, hospital escort: Yes.  Special equipment needed: Not Applicable    Be sure to schedule a follow-up appointment with your primary care doctor or any specialists as instructed.     Discharge Plan:   Diet Plan: Discussed  Activity Level: Discussed  Confirmed Follow up Appointment: Patient to Call and Schedule Appointment  Confirmed Symptoms Management: Discussed  Medication Reconciliation Updated: Yes    I understand that a diet low in cholesterol, fat, and sodium is recommended for good health. Unless I have been given specific instructions below for another diet, I accept this instruction as my diet prescription.   Other diet: regular    Special Instructions: None    · Is patient discharged on Warfarin / Coumadin?   No     · Is patient Post Blood Transfusion?  No    Depression / Suicide Risk    As you are discharged from this [facility] facility, it is important to learn how to keep safe from harming yourself.    Recognize the warning signs:  · Abrupt changes in personality, positive or negative- including increase in energy   · Giving away possessions  · Change in eating patterns- significant weight changes-  positive or negative  · Change in sleeping patterns- unable to sleep or sleeping all the time   · Unwillingness or inability to communicate  · Depression  · Unusual sadness, discouragement and " loneliness  · Talk of wanting to die  · Neglect of personal appearance   · Rebelliousness- reckless behavior  · Withdrawal from people/activities they love  · Confusion- inability to concentrate     If you or a loved one observes any of these behaviors or has concerns about self-harm, here's what you can do:  · Talk about it- your feelings and reasons for harming yourself  · Remove any means that you might use to hurt yourself (examples: pills, rope, extension cords, firearm)  · Get professional help from the community (Mental Health, Substance Abuse, psychological counseling)  · Do not be alone:Call your Safe Contact- someone whom you trust who will be there for you.  · Call your local CRISIS HOTLINE 647-4700 or 501-380-5488  · Call your local Children's Mobile Crisis Response Team [place] (967) 739-5899 or www.Media Armor  · Call the toll free National Suicide Prevention Hotlines   · National Suicide Prevention Lifeline 894-954-GSRC (2348)  · GMI Ratings Line Network 800-SUICIDE (753-9155)      Celulitis  (Cellulitis)    La celulitis es ranjeet infección de la piel y del tejido subyacente. El área infectada generalmente está de color fontaine y duele. Ocurre con más frecuencia en los brazos y en las piernas.   CAUSAS   La causa es ranjeet bacteria que ingresa en la piel a través de grietas o ham. Los tipos más frecuentes de bacterias que causan celulitis son los estafilococos y los estreptococos.  SIGNOS Y SÍNTOMAS   · Enrojecimiento y calor.  · Hinchazón.  · Sensibilidad o dolor.  · Fiebre.  DIAGNÓSTICO   Por lo general, el médico puede diagnosticar esta afección con un examen físico. Es posible que le indiquen análisis de gurinder.  TRATAMIENTO   El tratamiento consiste en martine antibióticos.  INSTRUCCIONES PARA EL CUIDADO EN EL HOGAR    · Chili los antibióticos aiden le indicó el médico. Termine los antibióticos aunque comience a sentirse mejor.  · Mantenga el brazo o la pierna elevados para reducir la  hinchazón.  · Aplique paños calientes en la felecia hasta 4 veces al día para calmar el dolor.  · [place] los medicamentos solamente aiden se lo haya indicado el médico.  · Concurra a todas las visitas de control aiden se lo haya indicado el médico.  SOLICITE ATENCIÓN MÉDICA SI:   · Observa ranjeet línea franki en la piel que sale desde la felecia infectada.  · El área franki se extiende o se vuelve de color oscuro.  · El hueso o la articulación que se encuentran por debajo de la felecia infectada le duelen después de que la piel se alfred.  · La infección se repite en la misma felecia o en ranjeet felecia diferente.  · Tiene un bulto inflamado en la felecia infectada.  · Presenta nuevos síntomas.  · Tiene fiebre.  SOLICITE ATENCIÓN MÉDICA DE INMEDIATO SI:   · Se siente muy somnoliento.  · Tiene vómitos o diarrea.  · Se siente enfermo (malestar general) con grant musculares.  ASEGÚRESE DE QUE:   · Comprende estas instrucciones.  · Controlará blake afección.  · Recibirá ayuda de inmediato si no mejora o si empeora.     Esta información no tiene aiden fin reemplazar el consejo del médico. Asegúrese de hacerle al médico cualquier pregunta que tenga.     Document Released: 09/27/2006 Document Revised: 01/08/2016  Elsevier Interactive Patient Education ©2016 Elsevier Inc.      Follow-up Information     1. Follow up with Thom Rodriguez M.D. In 3 days.    Specialty:  Family Medicine    Contact information    [address] [place] [address]  [phone]          2. Follow up with [facility], Emergency Dept.    Specialty:  Emergency Medicine    Why:  If symptoms worsen    Contact information    [address] [place] [id number] [phone]         Discharge Medication Instructions:    Below are the medications your physician expects you to take upon discharge:    Review all your home medications and newly ordered medications with your doctor and/or pharmacist. Follow medication instructions as directed by your doctor  and/or pharmacist.    Please keep your medication list with you and share with your physician.               Medication List      CHANGE how you take these medications        Instructions    Morning Afternoon Evening Bedtime    metoprolol 25 MG Tabs   What changed:  how much to take   Last time this was given:  25 mg on 8/18/2017  9:25 AM   Commonly known as:  LOPRESSOR        Take 1 Tab by mouth 2 times a day.   Dose:  25 mg                          CONTINUE taking these medications        Instructions    Morning Afternoon Evening Bedtime    ADVIL 200 MG Tabs   Generic drug:  ibuprofen        Take 400 mg by mouth every day.   Dose:  400 mg                        albuterol 108 (90 Base) MCG/ACT Aers inhalation aerosol        Inhale 2 Puffs by mouth every 6 hours as needed for Shortness of Breath.   Dose:  2 Puff                        AMITIZA 8 MCG Caps   Generic drug:  Lubiprostone        Take 8 mcg by mouth 2 Times a Day.   Dose:  8 mcg                        aspirin 81 MG tablet        Take 81 mg by mouth every day.   Dose:  81 mg                        * lorazepam 0.5 MG Tabs   Commonly known as:  ATIVAN        Take 0.5 mg by mouth every morning.   Dose:  0.5 mg                        * ATIVAN 2 MG tablet   Generic drug:  lorazepam        Take 2 mg by mouth at bedtime as needed. bedtime   Dose:  2 mg                        atorvastatin 40 MG Tabs   Last time this was given:  40 mg on 8/17/2017 10:02 PM   Commonly known as:  LIPITOR        Take 40 mg by mouth every evening.   Dose:  40 mg                        celecoxib 200 MG Caps   Commonly known as:  CELEBREX        Take 200 mg by mouth 2 times a day.   Dose:  200 mg                        CRANBERRY        400 mg by Does not apply route 2 Times a Day.   Dose:  400 mg                        CYMBALTA 30 MG Cpep   Last time this was given:  30 mg on 8/18/2017  9:25 AM   Generic drug:  duloxetine        Take 30 mg by mouth every day.   Dose:  30 mg                           Diclofenac Sodium 1 % Gel        Apply 1 g to skin as directed See Admin Instructions. Applies several times daily, to several areas of her body   Dose:  1 g                        docusate sodium 100 MG Caps   Commonly known as:  COLACE        Take 100 mg by mouth 2 times a day.   Dose:  100 mg                        hydrocodone-acetaminophen 7.5-325 MG per tablet   Commonly known as:  NORCO        Take 2 Tabs by mouth 2 Times a Day.   Dose:  2 Tab                        latanoprost 0.005 % Soln   Commonly known as:  XALATAN        Place 1 Drop in both eyes every evening.   Dose:  1 Drop                        MIRALAX Powd   Generic drug:  polyethylene glycol 3350        Take 17 g by mouth every day.   Dose:  17 g                        NEXIUM 40 MG delayed-release capsule   Generic drug:  esomeprazole        Take 40 mg by mouth every morning before breakfast.   Dose:  40 mg                        nitroglycerin 0.4 MG Subl   Commonly known as:  NITROSTAT        Place 1 Tab under tongue as needed for Chest Pain.   Dose:  0.4 mg                        PLAVIX 75 MG Tabs   Last time this was given:  75 mg on 8/18/2017  9:25 AM   Generic drug:  clopidogrel        Take 75 mg by mouth every day.   Dose:  75 mg                        RESTASIS 0.05 % ophthalmic emulsion   Generic drug:  cyclosporin        Place 1 Drop in both eyes 2 times a day.   Dose:  1 Drop                        sulfamethoxazole-trimethoprim 800-160 MG tablet   Commonly known as:  BACTRIM DS        Take 1 Tab by mouth 2 times a day.   Dose:  1 Tab                        * Notice:  This list has 2 medication(s) that are the same as other medications prescribed for you. Read the directions carefully, and ask your doctor or other care provider to review them with you.            Instructions           Diet / Nutrition:    Follow any diet instructions given to you by your doctor or the dietician, including how much salt (sodium) you are allowed  each day.    If you are overweight, talk to your doctor about a weight reduction plan.    Activity:    Remain physically active following your doctor's instructions about exercise and activity.    Rest often.     Any time you become even a little tired or short of breath, SIT DOWN and rest.    Worsening Symptoms:    Report any of the following signs and symptoms to the doctor's office immediately:    *Pain of jaw, arm, or neck  *Chest pain not relieved by medication                               *Dizziness or loss of consciousness  *Difficulty breathing even when at rest   *More tired than usual                                       *Bleeding drainage or swelling of surgical site  *Swelling of feet, ankles, legs or stomach                 *Fever (>100ºF)  *Pink or blood tinged sputum  *Weight gain (3lbs/day or 5lbs /week)           *Shock from internal defibrillator (if applicable)  *Palpitations or irregular heartbeats                *Cool and/or numb extremities    Stroke Awareness    Common Risk Factors for Stroke include:    Age  Atrial Fibrillation  Carotid Artery Stenosis  Diabetes Mellitus  Excessive alcohol consumption  High blood pressure  Overweight   Physical inactivity  Smoking    Warning signs and symptoms of a stroke include:    *Sudden numbness or weakness of the face, arm or leg (especially on one side of the body).  *Sudden confusion, trouble speaking or understanding.  *Sudden trouble seeing in one or both eyes.  *Sudden trouble walking, dizziness, loss of balance or coordination.Sudden severe headache with no known cause.    It is very important to get treatment quickly when a stroke occurs. If you experience any of the above warning signs, call 911 immediately.                   Disclaimer         Quit Smoking / Tobacco Use:    I understand the use of any tobacco products increases my chance of suffering from future heart disease or stroke and could cause other illnesses which may shorten my  life. Quitting the use of tobacco products is the single most important thing I can do to improve my health. For further information on smoking / tobacco cessation call a Toll Free Quit Line at 1-500.647.4284 (*National Cancer [place]) or 1-408.134.2619 (American Lung Association) or you can access the web based program at www.lungusa.org.    [place] Tobacco Users Help Line:  (687) 298-1006       Toll Free: 1-365.392.4851  Quit Tobacco Program [facility] (145)[phone]    Crisis Hotline:    [facility] Crisis Hotline:  2-651-UVQAXKE or 1-805.716.6279    [place] Crisis Hotline:    1-350.736.4335 or 167-315-7186    Discharge Survey:   Thank you for choosing [facility]. We hope we did everything we could to make your hospital stay a pleasant one. You may be receiving a phone survey and we would appreciate your time and participation in answering the questions. Your input is very valuable to us in our efforts to improve our service to our patients and their families.        My signature on this form indicates that:    1. I have reviewed and understand the above information.  2. My questions regarding this information have been answered to my satisfaction.  3. I have formulated a plan with my discharge nurse to obtain my prescribed medications for home.                  Disclaimer         __________________________________                     __________       ________                       Patient Signature                                                 Date                    Time

## 2017-08-16 NOTE — IP AVS SNAPSHOT
" <p align=\"LEFT\"><IMG SRC=\"//[email or web address]\" alt=\"Image\" WIDTH=\"50%\" HEIGHT=\"200\" BORDER=\"\"></p>                   Name:Navya Mcbride  Medical Record Number:[id number]  CSN: [id number]    YOB: 1935   Age: 82 y.o.  Sex: female  HT:1.626 m (5' 4\") WT: 63.594 kg (140 lb 3.2 oz)          Admit Date: 8/16/2017     Discharge Date:   Today's Date: 8/18/2017  Attending Doctor:  Kemal Urbina M.D.                  Allergies:  Iodine and Tequin          Follow-up Information     1. Follow up with Thom Rodriguez M.D. In 3 days.    Specialty:  Family Medicine    Contact information    [address] [place] [address]  [phone]          2. Follow up with [facility], Emergency Dept.    Specialty:  Emergency Medicine    Why:  If symptoms worsen    Contact information    [address] [place] [id number] [phone]         Medication List      Take these Medications        Instructions    ADVIL 200 MG Tabs   Generic drug:  ibuprofen    Take 400 mg by mouth every day.   Dose:  400 mg       albuterol 108 (90 Base) MCG/ACT Aers inhalation aerosol    Inhale 2 Puffs by mouth every 6 hours as needed for Shortness of Breath.   Dose:  2 Puff       AMITIZA 8 MCG Caps   Generic drug:  Lubiprostone    Take 8 mcg by mouth 2 Times a Day.   Dose:  8 mcg       aspirin 81 MG tablet    Take 81 mg by mouth every day.   Dose:  81 mg       * lorazepam 0.5 MG Tabs   Commonly known as:  ATIVAN    Take 0.5 mg by mouth every morning.   Dose:  0.5 mg       * ATIVAN 2 MG tablet   Generic drug:  lorazepam    Take 2 mg by mouth at bedtime as needed. bedtime   Dose:  2 mg       atorvastatin 40 MG Tabs   Commonly known as:  LIPITOR    Take 40 mg by mouth every evening.   Dose:  40 mg       celecoxib 200 MG Caps   Commonly known as:  CELEBREX    Take 200 mg by mouth 2 times a day.   Dose:  200 mg       CRANBERRY    400 mg by Does not apply route 2 Times a Day.   Dose:  400 mg      " CYMBALTA 30 MG Cpep   Generic drug:  duloxetine    Take 30 mg by mouth every day.   Dose:  30 mg       Diclofenac Sodium 1 % Gel    Apply 1 g to skin as directed See Admin Instructions. Applies several times daily, to several areas of her body   Dose:  1 g       docusate sodium 100 MG Caps   Commonly known as:  COLACE    Take 100 mg by mouth 2 times a day.   Dose:  100 mg       hydrocodone-acetaminophen 7.5-325 MG per tablet   Commonly known as:  NORCO    Take 2 Tabs by mouth 2 Times a Day.   Dose:  2 Tab       latanoprost 0.005 % Soln   Commonly known as:  XALATAN    Place 1 Drop in both eyes every evening.   Dose:  1 Drop       metoprolol 25 MG Tabs   What changed:  how much to take   Commonly known as:  LOPRESSOR    Take 1 Tab by mouth 2 times a day.   Dose:  25 mg       MIRALAX Powd   Generic drug:  polyethylene glycol 3350    Take 17 g by mouth every day.   Dose:  17 g       NEXIUM 40 MG delayed-release capsule   Generic drug:  esomeprazole    Take 40 mg by mouth every morning before breakfast.   Dose:  40 mg       nitroglycerin 0.4 MG Subl   Commonly known as:  NITROSTAT    Place 1 Tab under tongue as needed for Chest Pain.   Dose:  0.4 mg       PLAVIX 75 MG Tabs   Generic drug:  clopidogrel    Take 75 mg by mouth every day.   Dose:  75 mg       RESTASIS 0.05 % ophthalmic emulsion   Generic drug:  cyclosporin    Place 1 Drop in both eyes 2 times a day.   Dose:  1 Drop       sulfamethoxazole-trimethoprim 800-160 MG tablet   Commonly known as:  BACTRIM DS    Take 1 Tab by mouth 2 times a day.   Dose:  1 Tab       * Notice:  This list has 2 medication(s) that are the same as other medications prescribed for you. Read the directions carefully, and ask your doctor or other care provider to review them with you.

## 2017-08-17 ENCOUNTER — RESOLUTE PROFESSIONAL BILLING HOSPITAL PROF FEE (OUTPATIENT)
Dept: HOSPITALIST | Facility: MEDICAL CENTER | Age: 82
End: 2017-08-17
Payer: MEDICARE

## 2017-08-17 PROBLEM — L03.90 CELLULITIS: Status: ACTIVE | Noted: 2017-08-17

## 2017-08-17 PROBLEM — L02.91 ABSCESS: Status: ACTIVE | Noted: 2017-08-17

## 2017-08-17 PROBLEM — L27.0 RED MAN SYNDROME: Status: ACTIVE | Noted: 2017-08-17

## 2017-08-17 PROBLEM — L03.116 CELLULITIS OF LEFT LOWER EXTREMITY: Status: ACTIVE | Noted: 2017-08-17

## 2017-08-17 PROCEDURE — 304562 HCHG STAT O2 MASK/CANNULA

## 2017-08-17 PROCEDURE — 770006 HCHG ROOM/CARE - MED/SURG/GYN SEMI*

## 2017-08-17 PROCEDURE — 96375 TX/PRO/DX INJ NEW DRUG ADDON: CPT

## 2017-08-17 PROCEDURE — 700102 HCHG RX REV CODE 250 W/ 637 OVERRIDE(OP): Performed by: EMERGENCY MEDICINE

## 2017-08-17 PROCEDURE — 96361 HYDRATE IV INFUSION ADD-ON: CPT

## 2017-08-17 PROCEDURE — 700111 HCHG RX REV CODE 636 W/ 250 OVERRIDE (IP): Performed by: EMERGENCY MEDICINE

## 2017-08-17 PROCEDURE — A9270 NON-COVERED ITEM OR SERVICE: HCPCS | Performed by: STUDENT IN AN ORGANIZED HEALTH CARE EDUCATION/TRAINING PROGRAM

## 2017-08-17 PROCEDURE — 0H9MXZZ DRAINAGE OF RIGHT FOOT SKIN, EXTERNAL APPROACH: ICD-10-PCS | Performed by: INTERNAL MEDICINE

## 2017-08-17 PROCEDURE — 700105 HCHG RX REV CODE 258: Performed by: STUDENT IN AN ORGANIZED HEALTH CARE EDUCATION/TRAINING PROGRAM

## 2017-08-17 PROCEDURE — 700102 HCHG RX REV CODE 250 W/ 637 OVERRIDE(OP): Performed by: STUDENT IN AN ORGANIZED HEALTH CARE EDUCATION/TRAINING PROGRAM

## 2017-08-17 PROCEDURE — A9270 NON-COVERED ITEM OR SERVICE: HCPCS | Performed by: EMERGENCY MEDICINE

## 2017-08-17 PROCEDURE — 700111 HCHG RX REV CODE 636 W/ 250 OVERRIDE (IP)

## 2017-08-17 PROCEDURE — 96376 TX/PRO/DX INJ SAME DRUG ADON: CPT

## 2017-08-17 PROCEDURE — 96366 THER/PROPH/DIAG IV INF ADDON: CPT

## 2017-08-17 PROCEDURE — 700111 HCHG RX REV CODE 636 W/ 250 OVERRIDE (IP): Performed by: STUDENT IN AN ORGANIZED HEALTH CARE EDUCATION/TRAINING PROGRAM

## 2017-08-17 PROCEDURE — 99223 1ST HOSP IP/OBS HIGH 75: CPT | Performed by: INTERNAL MEDICINE

## 2017-08-17 RX ORDER — ALBUTEROL SULFATE 90 UG/1
2 AEROSOL, METERED RESPIRATORY (INHALATION) EVERY 4 HOURS PRN
Status: DISCONTINUED | OUTPATIENT
Start: 2017-08-17 | End: 2017-08-18 | Stop reason: HOSPADM

## 2017-08-17 RX ORDER — MORPHINE SULFATE 4 MG/ML
2 INJECTION, SOLUTION INTRAMUSCULAR; INTRAVENOUS ONCE
Status: COMPLETED | OUTPATIENT
Start: 2017-08-17 | End: 2017-08-17

## 2017-08-17 RX ORDER — MORPHINE SULFATE 4 MG/ML
1 INJECTION, SOLUTION INTRAMUSCULAR; INTRAVENOUS ONCE
Status: DISCONTINUED | OUTPATIENT
Start: 2017-08-17 | End: 2017-08-17

## 2017-08-17 RX ORDER — DEXTROSE MONOHYDRATE 25 G/50ML
25 INJECTION, SOLUTION INTRAVENOUS
Status: DISCONTINUED | OUTPATIENT
Start: 2017-08-17 | End: 2017-08-18 | Stop reason: HOSPADM

## 2017-08-17 RX ORDER — BUDESONIDE AND FORMOTEROL FUMARATE DIHYDRATE 80; 4.5 UG/1; UG/1
2 AEROSOL RESPIRATORY (INHALATION) 2 TIMES DAILY
Status: DISCONTINUED | OUTPATIENT
Start: 2017-08-17 | End: 2017-08-18 | Stop reason: HOSPADM

## 2017-08-17 RX ORDER — AMOXICILLIN 250 MG
2 CAPSULE ORAL 2 TIMES DAILY
Status: DISCONTINUED | OUTPATIENT
Start: 2017-08-17 | End: 2017-08-18 | Stop reason: HOSPADM

## 2017-08-17 RX ORDER — ATORVASTATIN CALCIUM 20 MG/1
40 TABLET, FILM COATED ORAL NIGHTLY
Status: DISCONTINUED | OUTPATIENT
Start: 2017-08-17 | End: 2017-08-18 | Stop reason: HOSPADM

## 2017-08-17 RX ORDER — DULOXETIN HYDROCHLORIDE 30 MG/1
30 CAPSULE, DELAYED RELEASE ORAL DAILY
Status: DISCONTINUED | OUTPATIENT
Start: 2017-08-17 | End: 2017-08-18 | Stop reason: HOSPADM

## 2017-08-17 RX ORDER — AMITRIPTYLINE HYDROCHLORIDE 75 MG/1
75 TABLET ORAL NIGHTLY PRN
Status: DISCONTINUED | OUTPATIENT
Start: 2017-08-17 | End: 2017-08-18 | Stop reason: HOSPADM

## 2017-08-17 RX ORDER — DEXAMETHASONE SODIUM PHOSPHATE 4 MG/ML
10 INJECTION, SOLUTION INTRA-ARTICULAR; INTRALESIONAL; INTRAMUSCULAR; INTRAVENOUS; SOFT TISSUE ONCE
Status: COMPLETED | OUTPATIENT
Start: 2017-08-17 | End: 2017-08-17

## 2017-08-17 RX ORDER — CLOPIDOGREL BISULFATE 75 MG/1
75 TABLET ORAL DAILY
Status: DISCONTINUED | OUTPATIENT
Start: 2017-08-17 | End: 2017-08-18 | Stop reason: HOSPADM

## 2017-08-17 RX ORDER — DIPHENHYDRAMINE HCL 25 MG
25 TABLET ORAL ONCE
Status: DISCONTINUED | OUTPATIENT
Start: 2017-08-17 | End: 2017-08-17

## 2017-08-17 RX ORDER — LATANOPROST 50 UG/ML
1 SOLUTION/ DROPS OPHTHALMIC NIGHTLY
Status: DISCONTINUED | OUTPATIENT
Start: 2017-08-17 | End: 2017-08-18 | Stop reason: HOSPADM

## 2017-08-17 RX ORDER — OXYCODONE HYDROCHLORIDE AND ACETAMINOPHEN 5; 325 MG/1; MG/1
1-2 TABLET ORAL EVERY 4 HOURS PRN
Status: DISCONTINUED | OUTPATIENT
Start: 2017-08-17 | End: 2017-08-18 | Stop reason: HOSPADM

## 2017-08-17 RX ORDER — DIPHENHYDRAMINE HYDROCHLORIDE 50 MG/ML
25 INJECTION INTRAMUSCULAR; INTRAVENOUS ONCE
Status: COMPLETED | OUTPATIENT
Start: 2017-08-17 | End: 2017-08-17

## 2017-08-17 RX ORDER — SODIUM CHLORIDE 9 MG/ML
INJECTION, SOLUTION INTRAVENOUS CONTINUOUS
Status: DISCONTINUED | OUTPATIENT
Start: 2017-08-17 | End: 2017-08-18 | Stop reason: HOSPADM

## 2017-08-17 RX ORDER — POLYETHYLENE GLYCOL 3350 17 G/17G
1 POWDER, FOR SOLUTION ORAL
Status: DISCONTINUED | OUTPATIENT
Start: 2017-08-17 | End: 2017-08-18 | Stop reason: HOSPADM

## 2017-08-17 RX ORDER — HYDROCODONE BITARTRATE AND ACETAMINOPHEN 7.5; 325 MG/1; MG/1
2 TABLET ORAL 2 TIMES DAILY
COMMUNITY
End: 2019-05-20

## 2017-08-17 RX ORDER — MIRTAZAPINE 30 MG/1
30 TABLET, FILM COATED ORAL NIGHTLY
Status: DISCONTINUED | OUTPATIENT
Start: 2017-08-17 | End: 2017-08-18 | Stop reason: HOSPADM

## 2017-08-17 RX ORDER — ESOMEPRAZOLE MAGNESIUM 40 MG/1
40 CAPSULE, DELAYED RELEASE ORAL
Status: DISCONTINUED | OUTPATIENT
Start: 2017-08-17 | End: 2017-08-17

## 2017-08-17 RX ORDER — ESOMEPRAZOLE MAGNESIUM 40 MG/1
40 CAPSULE, DELAYED RELEASE ORAL
COMMUNITY
End: 2024-01-16

## 2017-08-17 RX ORDER — ACETAMINOPHEN 325 MG/1
650 TABLET ORAL EVERY 6 HOURS PRN
Status: DISCONTINUED | OUTPATIENT
Start: 2017-08-17 | End: 2017-08-18 | Stop reason: HOSPADM

## 2017-08-17 RX ORDER — OMEPRAZOLE 20 MG/1
20 CAPSULE, DELAYED RELEASE ORAL DAILY
Status: DISCONTINUED | OUTPATIENT
Start: 2017-08-17 | End: 2017-08-18 | Stop reason: HOSPADM

## 2017-08-17 RX ORDER — MORPHINE SULFATE 4 MG/ML
INJECTION, SOLUTION INTRAMUSCULAR; INTRAVENOUS
Status: COMPLETED
Start: 2017-08-17 | End: 2017-08-17

## 2017-08-17 RX ORDER — ALBUTEROL SULFATE 90 UG/1
2 AEROSOL, METERED RESPIRATORY (INHALATION) EVERY 6 HOURS PRN
COMMUNITY

## 2017-08-17 RX ORDER — ACETAMINOPHEN 325 MG/1
650 TABLET ORAL ONCE
Status: COMPLETED | OUTPATIENT
Start: 2017-08-17 | End: 2017-08-17

## 2017-08-17 RX ORDER — BISACODYL 10 MG
10 SUPPOSITORY, RECTAL RECTAL
Status: DISCONTINUED | OUTPATIENT
Start: 2017-08-17 | End: 2017-08-18 | Stop reason: HOSPADM

## 2017-08-17 RX ORDER — ASPIRIN 81 MG/1
81 TABLET, CHEWABLE ORAL DAILY
Status: DISCONTINUED | OUTPATIENT
Start: 2017-08-17 | End: 2017-08-18 | Stop reason: HOSPADM

## 2017-08-17 RX ORDER — NITROGLYCERIN 0.4 MG/1
0.4 TABLET SUBLINGUAL PRN
Status: DISCONTINUED | OUTPATIENT
Start: 2017-08-17 | End: 2017-08-18 | Stop reason: HOSPADM

## 2017-08-17 RX ADMIN — ACETAMINOPHEN 650 MG: 325 TABLET, FILM COATED ORAL at 02:08

## 2017-08-17 RX ADMIN — MORPHINE SULFATE 2 MG: 4 INJECTION INTRAVENOUS at 14:00

## 2017-08-17 RX ADMIN — SODIUM CHLORIDE: 9 INJECTION, SOLUTION INTRAVENOUS at 06:10

## 2017-08-17 RX ADMIN — DEXAMETHASONE SODIUM PHOSPHATE 10 MG: 4 INJECTION, SOLUTION INTRAMUSCULAR; INTRAVENOUS at 02:09

## 2017-08-17 RX ADMIN — ATORVASTATIN CALCIUM 40 MG: 20 TABLET, FILM COATED ORAL at 22:02

## 2017-08-17 RX ADMIN — DIPHENHYDRAMINE HYDROCHLORIDE 25 MG: 50 INJECTION, SOLUTION INTRAMUSCULAR; INTRAVENOUS at 02:11

## 2017-08-17 RX ADMIN — MORPHINE SULFATE 2 MG: 4 INJECTION, SOLUTION INTRAMUSCULAR; INTRAVENOUS at 14:00

## 2017-08-17 RX ADMIN — SODIUM CHLORIDE: 9 INJECTION, SOLUTION INTRAVENOUS at 18:10

## 2017-08-17 RX ADMIN — DIPHENHYDRAMINE HYDROCHLORIDE 25 MG: 50 INJECTION, SOLUTION INTRAMUSCULAR; INTRAVENOUS at 00:53

## 2017-08-17 RX ADMIN — METOPROLOL TARTRATE 25 MG: 25 TABLET, FILM COATED ORAL at 18:10

## 2017-08-17 RX ADMIN — MORPHINE SULFATE 2 MG: 4 INJECTION INTRAVENOUS at 13:43

## 2017-08-17 RX ADMIN — STANDARDIZED SENNA CONCENTRATE AND DOCUSATE SODIUM 2 TABLET: 8.6; 5 TABLET, FILM COATED ORAL at 21:00

## 2017-08-17 ASSESSMENT — ENCOUNTER SYMPTOMS
SHORTNESS OF BREATH: 0
WEAKNESS: 1
WEAKNESS: 0
CONSTIPATION: 1
PALPITATIONS: 0
BRUISES/BLEEDS EASILY: 1
HEADACHES: 0
FLANK PAIN: 0
TINGLING: 0
HEADACHES: 1
NAUSEA: 0
DEPRESSION: 0
SORE THROAT: 0
WEIGHT LOSS: 0
ORTHOPNEA: 0
COUGH: 0
DIZZINESS: 0
DIZZINESS: 1
BLURRED VISION: 0
CHILLS: 1
ABDOMINAL PAIN: 0
BACK PAIN: 0
NERVOUS/ANXIOUS: 0
FEVER: 0
DIARRHEA: 0
VOMITING: 0
FALLS: 0

## 2017-08-17 ASSESSMENT — LIFESTYLE VARIABLES
EVER_SMOKED: NEVER
EVER_SMOKED: NEVER
ALCOHOL_USE: NO

## 2017-08-17 ASSESSMENT — PATIENT HEALTH QUESTIONNAIRE - PHQ9
1. LITTLE INTEREST OR PLEASURE IN DOING THINGS: NOT AT ALL
2. FEELING DOWN, DEPRESSED, IRRITABLE, OR HOPELESS: NOT AT ALL
SUM OF ALL RESPONSES TO PHQ QUESTIONS 1-9: 0
SUM OF ALL RESPONSES TO PHQ9 QUESTIONS 1 AND 2: 0

## 2017-08-17 ASSESSMENT — COPD QUESTIONNAIRES
HAVE YOU SMOKED AT LEAST 100 CIGARETTES IN YOUR ENTIRE LIFE: NO/DON'T KNOW
COPD SCREENING SCORE: 4
DO YOU EVER COUGH UP ANY MUCUS OR PHLEGM?: NO/ONLY WITH OCCASIONAL COLDS OR INFECTIONS
DURING THE PAST 4 WEEKS HOW MUCH DID YOU FEEL SHORT OF BREATH: SOME OF THE TIME

## 2017-08-17 ASSESSMENT — PAIN SCALES - GENERAL: PAINLEVEL_OUTOF10: 2

## 2017-08-17 NOTE — ED NOTES
Med rec complete per family at bedside  Allergies reviewed    Patient started Bactrim but her last dose was 816-17 AM due to being here and not taking any more doses

## 2017-08-17 NOTE — ED NOTES
Seen in UC two days ago and treated for right food cellulitis, returns with worsening symptoms.  Given IM antibiotics and oral antibiotics.  Hurts to walk.  No noted fevers.  Redness and swelling noted.

## 2017-08-17 NOTE — ED NOTES
Pt experiencing systemic itchiness, IV abx rate decreased to 175mls/hr, ok per pharmacists Michele.

## 2017-08-17 NOTE — SENIOR ADMIT NOTE
"  A 82 years female with recent diagnosis of right foot cellulitis failed bactrim outpatient treatment that was started two days ago, she was having progressive increase in pain, edema and redness of the right foot. In the ED patient was given IV oritavancin (ORBATIV) 1,200 mg, [ Per pharmacy \"a lipophilic anti MRSA medication, that stays in the system for 10 days after a single injection\"], during the infusion the patient developed maculopapular rash, flushed and itching, with drop in blood pressure, however without shortness of breath, wheeze, dizziness or confusion. Symptoms improved with slowing the infusion rate, a presumed diagnoses of red man syndrome was made. EDP requested admission for observation.    Her exam was remarkable for low blood pressure of 95/50, Hr 73, alert and oriented, clear lungs without added sounds. There is a found 4 X 6 CM erythematous area on the dorsum of the right foot, with fluctuant yellowish pus fill vesicle ~ 0.5-1 cm within the erythematous region. Hot and very tender. There is a maculopapular rash that is most prominent on the back of the patient that fades with pressure.     CBC & BMP are unremarkable, however she as elevation of CRP.      Red man syndrome due to oritavancin (ORBATIV) infusion    Improved with slowing the infusion rate & diphenhydramine     Tele observation admission, till BP stabilizes        Cellulitis without systemic features, failed outpatient ABX Rx   Mostly MRSA    Mostly needs incision and drainage    Got oritavancin (ORBATIV) infusion, [Per pharmacy \"a lipophilic anti MRSA medication, that stays in the  system for 10 days after a single injection\"]     CAD   Continue home aspirin, plavix, atorvastatin, metoprolol, PRN nitro        "

## 2017-08-17 NOTE — ASSESSMENT & PLAN NOTE
- The patient condition is improving and the itching has resolved.    Plan:  - Benadryl as needed at home.

## 2017-08-17 NOTE — H&P
Internal Medicine Admitting History and Physical    Name Navya Mcbride     1935   Age/Sex 82 y.o. female   MRN [id number]   Code Status  full code      After 5PM or if no immediate response to page, please call for cross-coverage  Attending/Team:   Ciara toro MD See Patient List for primary contact information  Call [phone] to page    1st Call - Day Intern (R1):   Ana Nicole MD 2nd Call - Day Sr. Resident (R2/R3):   Byron Rogers MD       Chief Complaint:  Right foot cellulitis    HPI:  The patient is a 82-year-old female who presents to the emergency department with her daughter and granddaughter with significant past medical history of colitis, fibromyalgia, pulmonary embolism, arthritis with chief complaint of redness, swelling, pain off the right foot.  She had a possible spider bite 5 days ago and she was taken to the urgent care where she was treated with IM Rocephin and a dose of Bactrim. She however continues to have increasing swelling and pain and redness. She also reports chills in the night but denies fever, nausea, vomiting, shortness of breath, chest pain, tingling or numbness. No history of recent travel . Denies similar episodes in the past and denies history of frequent infections.  She is currently on 2 L of continuous oxygen at the night.    Review of Systems   Constitutional: Positive for chills. Negative for fever, weight loss and malaise/fatigue.   HENT: Negative for congestion, hearing loss, sore throat and tinnitus.    Eyes: Negative for blurred vision.   Respiratory: Negative for cough and shortness of breath.    Cardiovascular: Positive for leg swelling. Negative for chest pain, palpitations and orthopnea.   Gastrointestinal: Positive for constipation. Negative for nausea, vomiting, abdominal pain and diarrhea.   Genitourinary: Negative for flank pain.   Musculoskeletal: Positive for joint pain. Negative for back pain and falls.   Skin: Negative for  "rash.   Neurological: Positive for dizziness and headaches. Negative for weakness.   Endo/Heme/Allergies: Bruises/bleeds easily.   Psychiatric/Behavioral: Negative for depression. The patient is not nervous/anxious.              Past Medical History:   Past Medical History   Diagnosis Date   • Colitis    • Gastritis    • Fibromyalgia    • Coarse tremors    • Heart burn    • Breath shortness      oxygen 2L N/C prn   • Indigestion    • Dental disorder      chronic gum infection   • Hiatus hernia syndrome    • CATARACT    • Pain      fibromyalgia   • Urinary bladder disorder      hematuria chronic   • Osteoporosis    • Pneumonia      4/4/2011   • Backpain      compressed discs   • Anemia      chronic   • Pulmonary embolism (CMS-HCC)      left lung   • Other specified disorder of intestines      colitis, bloating, constipation   • Asthma      uses inhaler   • Psychiatric problem      depression   • Clostridium difficile infection 2009   • Stroke (CMS-HCC) 2009     right weakness   • Anesthesia      \"hard to wake up\"   • Myocardial infarct (CMS-HCC) 5/2015     silent MI   • Arthritis      hands, states RA and Osteo       Past Surgical History:  Past Surgical History   Procedure Laterality Date   • Sabrina by laparoscopy  4/19/2009     Performed by GANSER, JOHN H at SURGERY [facility] ORS   • Rotator cuff repair  2000     left   • Hysterectomy, total abdominal  1973   • Knee arthroscopy  10/8/2009     Performed by JAYSHREE VIVEROS at [facility] ORS   • Medial meniscectomy  10/8/2009     Performed by JAYSHREE VIVEROS at [facility] ORS   • Meniscectomy  10/8/2009     Performed by JAYSHREE VIVEROS at [facility] ORS   • Recovery  6/11/2015     Procedure:  CATH LAB  [facility] WITH POSS. ANGESRCONNORG ICD; 794.30;  Surgeon: Recoveryonly Surgery;  Location: SURGERY PRE-POST PROC UNIT [facility];  Service:        Current Outpatient Medications:  Home Medications     Reviewed by Jay Jay Gary R.N. (Registered Nurse) " on 08/16/17 at 2224  Med List Status: Not Addressed    Medication Last Dose Status    ALBUTEROL INH 8/14/2017 Active    AMITIZA 8 MCG PO CAPS 8/14/2017 Active    AMITRIPTYLINE HCL 25 MG PO TABS  Active    aspirin 81 MG tablet 8/14/2017 Active    ATIVAN 2 MG PO TABS 8/14/2017 Active    atorvastatin (LIPITOR) 40 MG TABS 8/14/2017 Active    Budesonide-Formoterol Fumarate (SYMBICORT INH) 8/14/2017 Active    celecoxib (CELEBREX) 200 MG Cap 8/14/2017 Active    Cholecalciferol, [id number], (VITAMIN D3) 8/14/2017 Active    clopidogrel (PLAVIX) 75 MG TABS 8/14/2017 Active    CRANBERRY 8/14/2017 Active    cyclosporin (RESTASIS) 0.05 % ophthalmic emulsion 8/14/2017 Active    Diclofenac Sodium 1 % Gel 8/14/2017 Active    docusate sodium (COLACE) 100 MG CAPS 8/14/2017 Active    duloxetine (CYMBALTA) 30 MG CPEP 8/14/2017 Active    esomeprazole (NEXIUM) 40 MG delayed-release capsule 8/14/2017 Active    estradiol (VAGIFEM) 10 MCG Tab 8/14/2017 Active    fluorometholone (FML) 0.1 % SUSP 8/14/2017 Active    ibuprofen (ADVIL) 200 MG TABS 8/14/2017 Active    latanoprost (XALATAN) 0.005 % SOLN 8/14/2017 Active    lorazepam (ATIVAN) 0.5 MG Tab 8/14/2017 Active    metoprolol (LOPRESSOR) 25 MG TABS 8/14/2017 Active    mirtazapine (REMERON SOLTAB) 30 MG TBDP PRN Active    nitroglycerin (NITROSTAT) 0.4 MG SUBL PRN Active    oxycodone-acetaminophen (PERCOCET) 5-325 MG TABS 8/14/2017 Active    polyethylene glycol 3350 (MIRALAX) POWD 8/14/2017 Active    STOOL SOFTENER PO  Active    sulfamethoxazole-trimethoprim (BACTRIM DS) 800-160 MG tablet  Active                Medication Allergy/Sensitivities:  Allergies   Allergen Reactions   • Iodine Hives     dyspnea   • Tequin [Gatifloxacin Sesquihydrate] Hives     Severe rash and SOB         Family History:  Family History   Problem Relation Age of Onset   • Diabetes     • Heart Disease     • Hypertension     • Stroke     • Cancer     • Anesthesia Father      difficulty waking post anesthesia  "      Social History:  Social History     Social History   • Marital Status:      Spouse Name: N/A   • Number of Children: N/A   • Years of Education: N/A     Occupational History   • Not on file.     Social History Main Topics   • Smoking status: Never Smoker    • Smokeless tobacco: Never Used   • Alcohol Use: No   • Drug Use: No   • Sexual Activity: Not on file     Other Topics Concern   • Not on file     Social History Narrative     Living situation: Lives with her daughter  PCP : Thom Rodriguez M.D.      Physical Exam     Filed Vitals:    08/17/17 0400 08/17/17 0430 08/17/17 0447 08/17/17 0530   BP:       Pulse: 73 69  72   Temp:       TempSrc:       Resp:  14  12   Height:       Weight:   63.504 kg (140 lb)    SpO2: 97% 96%  98%     Body mass index is 24.02 kg/(m^2).  /78 mmHg  Pulse 72  Temp(Src) 35.7 °C (96.3 °F) (Temporal)  Resp 12  Ht 1.626 m (5' 4\")  Wt 63.504 kg (140 lb)  BMI 24.02 kg/m2  SpO2 98%  O2 therapy: Pulse Oximetry: 98 %, O2 (LPM): 2, O2 Delivery: Nasal Cannula    Physical Exam   Constitutional: She is oriented to person, place, and time and well-developed, well-nourished, and in no distress.   HENT:   Head: Normocephalic and atraumatic.   Eyes: Conjunctivae and EOM are normal. Pupils are equal, round, and reactive to light.   Neck: Normal range of motion. Neck supple. No JVD present.   Cardiovascular: Normal rate, regular rhythm, normal heart sounds and intact distal pulses.    No murmur heard.  Pulmonary/Chest: Breath sounds normal. No stridor.   Abdominal: Soft. Bowel sounds are normal.   Neurological: She is alert and oriented to person, place, and time.    skin:  There is an approximately 8-10 cm erythematous, indurated, non-fluctuant, tender and warm area on the dorsum of the right foot. There is a central deeper erythematous nodule at the [place] of the wound. Distal pulses are present. No active drainage of pus or fluid.  No ankle swelling or tenderness.        Data " Review       Old Records Request:   Completed  Current Records review and summary: Completed    Lab Data Review:  Recent Results (from the past 24 hour(s))   CBC WITH DIFFERENTIAL    Collection Time: 08/16/17  6:25 PM   Result Value Ref Range    WBC 7.5 4.8 - 10.8 K/uL    RBC 4.09 (L) 4.20 - 5.40 M/uL    Hemoglobin 12.1 12.0 - 16.0 g/dL    Hematocrit 36.8 (L) 37.0 - 47.0 %    MCV 90.0 81.4 - 97.8 fL    MCH 29.6 27.0 - 33.0 pg    MCHC 32.9 (L) 33.6 - 35.0 g/dL    RDW 43.8 35.9 - 50.0 fL    Platelet Count 229 164 - 446 K/uL    MPV 10.4 9.0 - 12.9 fL    Neutrophils-Polys 59.10 44.00 - 72.00 %    Lymphocytes 29.30 22.00 - 41.00 %    Monocytes 9.70 0.00 - 13.40 %    Eosinophils 1.50 0.00 - 6.90 %    Basophils 0.30 0.00 - 1.80 %    Immature Granulocytes 0.10 0.00 - 0.90 %    Nucleated RBC 0.00 /100 WBC    Neutrophils (Absolute) 4.41 2.00 - 7.15 K/uL    Lymphs (Absolute) 2.18 1.00 - 4.80 K/uL    Monos (Absolute) 0.72 0.00 - 0.85 K/uL    Eos (Absolute) 0.11 0.00 - 0.51 K/uL    Baso (Absolute) 0.02 0.00 - 0.12 K/uL    Immature Granulocytes (abs) 0.01 0.00 - 0.11 K/uL    NRBC (Absolute) 0.00 K/uL   CMP    Collection Time: 08/16/17  6:25 PM   Result Value Ref Range    Sodium 137 135 - 145 mmol/L    Potassium 4.4 3.6 - 5.5 mmol/L    Chloride 105 96 - 112 mmol/L    Co2 25 20 - 33 mmol/L    Anion Gap 7.0 0.0 - 11.9    Glucose 91 65 - 99 mg/dL    Bun 19 8 - 22 mg/dL    Creatinine 0.88 0.50 - 1.40 mg/dL    Calcium 9.2 8.5 - 10.5 mg/dL    AST(SGOT) 15 12 - 45 U/L    ALT(SGPT) 8 2 - 50 U/L    Alkaline Phosphatase 77 30 - 99 U/L    Total Bilirubin 0.2 0.1 - 1.5 mg/dL    Albumin 3.6 3.2 - 4.9 g/dL    Total Protein 6.3 6.0 - 8.2 g/dL    Globulin 2.7 1.9 - 3.5 g/dL    A-G Ratio 1.3 g/dL   ESTIMATED GFR    Collection Time: 08/16/17  6:25 PM   Result Value Ref Range    GFR If African American >60 >60 mL/min/1.73 m 2    GFR If Non African American >60 >60 mL/min/1.73 m 2   CRP QUANTITIVE (NON-CARDIAC)    Collection Time: 08/16/17  6:25 PM  "  Result Value Ref Range    Stat C-Reactive Protein 5.23 (H) 0.00 - 0.75 mg/dL       Imaging/Procedures Review:    ndependant Imaging Review: Completed  No orders to display           Assessment/Plan   1) cellulitis of right foot:  82-year-old female with worsening pain, swelling, redness of the right foot.  8-10 cm erythematous, indurated, non-fluctuant, tender and warm area on the dorsum of the right foot. There is a central deeper erythematous nodule at the [place] of the wound.  Treated earlier with IM Rocephin and Bactrim and failed to improve.  GFR greater than 60.  WBC: 7.5    Plan:  Observation admit to telemetry.  Supportive care- NS infusion continuous   ortiavancin 1200 milligrams IV infusion one dose one time.  She had developed red man syndrome with ortiavancin .will slow the infusion rate to <30 .  Elevate the foot   Monitor erythema closely.  May require I&D.    2) \"red man\" syndrome:  Cellulitis of right foot with 8-10 cm erythematous, indurated, non-fluctuant, tender and warm area on the dorsum of the right foot. There is a central deeper erythematous nodule at the [place] of the wound.  Complained of rash in the back, itching, dry throat, swollen tongue on infusion with ortiavancin.  Plan:  1 dose of Diphenhydramine injection and diphenhydramine 25 mg tablet was given.  Patient symptoms improved.  Slow the infusion rate of ortiavancin.      Anticipated Hospital stay: Observation admit        Quality Measures  Labs reviewed, Medications reviewed and EKG reviewed  Villalpando catheter: No Villalpando      DVT Prophylaxis: Enoxaparin (Lovenox)                "

## 2017-08-17 NOTE — PROGRESS NOTES
"Oritavancin pharmacy note:   2017    Navya Mcbride is a 82 y.o. female who presents to the emergency department accompanied by her family with the chief complaint of pain redness swelling of the right foot. Patient reports he feels as though she was bit by an insect on . It now appears she has developed a secondary cellulitis. She was taken to an urgent care and was given IM Rocephin and a dose of Bactrim she took a 2nd dose of Bactrim at home however since continued to have increasing redness and pain.      No signs of systemic infection.    Allergies: Iodine and Tequin     Renal function: CrCl >30 ml/min    Recent Labs      17   1825   WBC  7.5   NEUTSPOLYS  59.10     Recent Labs      17   1825   BUN  19   CREATININE  0.88   ALBUMIN  3.6     Blood pressure 115/64, pulse 78, temperature 35.7 °C (96.3 °F), temperature source Temporal, resp. rate 2, height 1.626 m (5' 4\"), SpO2 96 %. Temp (24hrs), Av.7 °C (96.3 °F), Min:35.7 °C (96.3 °F), Max:35.7 °C (96.3 °F)    Exam:    Right RE: Patient has obvious cellulitis on top of foot. There is no purulence on palpitation. Painful to the touch. Warm to the touch, at most is 3 inch in diameter. There is no need for incision and drainage. Foot is obviously swollen compared to LE.    Assessment:  Oritavancin is being considered as a treatment for SSTI (Staphylococcus and Streptococcus coverage) as it lasts 10 days.  This is ideal in this patient in order to avoid an inpatient admission >2 days (obvious need for IV antiboitics).     Plan:  Oritavancin 1200 mg IV infused over 3 hours. Flush line prior to and after with 20-30 mL D5W.     Patient to follow up with outpatient MD in 3 days or return to ER if no improvement.  Blood cultures x 2 ordered, CRP ordered    Marshall Orozco, PharmD, BCPS    Addendum:  Patient developed classic \"Ebony syndrome\" during first hour of infusion, without hypotension. This is a known reaction.   Rate of " infusion was decreased and patient was administered benadryl.   Re-examined patient in room - doing much better, symptoms resolved. Discussed this reaction with family.   Discussed with ER physician. Orbactiv infusion has limited stability so rate must be increased from current rate.    Patient will be premedicated with Tylenol, IV benadryl and IV Decadron. Rate increased to 180ml/hr to finish bag on time (previously 333ml/hr). RN to monitor for return of Ebony syndrome.

## 2017-08-17 NOTE — ED PROVIDER NOTES
ED Provider Note    ED Provider Note      Primary care provider: Thom Rodriguez M.D.    CHIEF COMPLAINT  Chief Complaint   Patient presents with   • Foot Swelling     right foot cellulitis        HPI  Navya Mcbride is a 82 y.o. female who presents to the Emergency Department  presents to the emergency department accompanied by her daughter and granddaughter with the chief complaint of pain redness swelling of the right foot. Patient reports he feels as though she was bit by an insect on Sunday Sunday afternoon and Monday the area (BM red and more swollen. She was taken to an urgent care she was given IM Rocephin and a dose of Bactrim she took a 2nd dose of Bactrim at home however since continued to have increasing redness and pain. The area and concern is warm however she's had no fevers no chills no nausea no vomiting no cough congestion chest pain shortness breath no other complaints at this time pain is rated as moderate worse with ambulation better with rest and elevation.    REVIEW OF SYSTEMS  10 systems reviewed and otherwise negative, pertinent positives and negatives listed in the history of present illness.    PAST MEDICAL HISTORY   has a past medical history of Colitis; Gastritis; Fibromyalgia; Coarse tremors; Heart burn; Breath shortness; Indigestion; Dental disorder; Hiatus hernia syndrome; CATARACT; Pain; Urinary bladder disorder; Osteoporosis; Pneumonia; Backpain; Anemia; Pulmonary embolism (CMS-HCC); Other specified disorder of intestines; Asthma; Psychiatric problem; Clostridium difficile infection (2009); Stroke (CMS-HCC) (2009); Anesthesia; Myocardial infarct (CMS-HCC) (5/2015); and Arthritis.    SURGICAL HISTORY   has past surgical history that includes erendira by laparoscopy (4/19/2009); rotator cuff repair (2000); hysterectomy, total abdominal (1973); knee arthroscopy (10/8/2009); medial meniscectomy (10/8/2009); meniscectomy (10/8/2009); and recovery (6/11/2015).    SOCIAL  HISTORY  Social History   Substance Use Topics   • Smoking status: Never Smoker    • Smokeless tobacco: Never Used   • Alcohol Use: No      History   Drug Use No       FAMILY HISTORY  Non-Contributory    CURRENT MEDICATIONS  Home Medications     Reviewed by Jay Jay Gary R.N. (Registered Nurse) on 08/16/17 at 2224  Med List Status: Not Addressed    Medication Last Dose Status    ALBUTEROL INH 8/14/2017 Active    AMITIZA 8 MCG PO CAPS 8/14/2017 Active    AMITRIPTYLINE HCL 25 MG PO TABS  Active    aspirin 81 MG tablet 8/14/2017 Active    ATIVAN 2 MG PO TABS 8/14/2017 Active    atorvastatin (LIPITOR) 40 MG TABS 8/14/2017 Active    Budesonide-Formoterol Fumarate (SYMBICORT INH) 8/14/2017 Active    celecoxib (CELEBREX) 200 MG Cap 8/14/2017 Active    Cholecalciferol, [id number], (VITAMIN D3) 8/14/2017 Active    clopidogrel (PLAVIX) 75 MG TABS 8/14/2017 Active    CRANBERRY 8/14/2017 Active    cyclosporin (RESTASIS) 0.05 % ophthalmic emulsion 8/14/2017 Active    Diclofenac Sodium 1 % Gel 8/14/2017 Active    docusate sodium (COLACE) 100 MG CAPS 8/14/2017 Active    duloxetine (CYMBALTA) 30 MG CPEP 8/14/2017 Active    esomeprazole (NEXIUM) 40 MG delayed-release capsule 8/14/2017 Active    estradiol (VAGIFEM) 10 MCG Tab 8/14/2017 Active    fluorometholone (FML) 0.1 % SUSP 8/14/2017 Active    ibuprofen (ADVIL) 200 MG TABS 8/14/2017 Active    latanoprost (XALATAN) 0.005 % SOLN 8/14/2017 Active    lorazepam (ATIVAN) 0.5 MG Tab 8/14/2017 Active    metoprolol (LOPRESSOR) 25 MG TABS 8/14/2017 Active    mirtazapine (REMERON SOLTAB) 30 MG TBDP PRN Active    nitroglycerin (NITROSTAT) 0.4 MG SUBL PRN Active    oxycodone-acetaminophen (PERCOCET) 5-325 MG TABS 8/14/2017 Active    polyethylene glycol 3350 (MIRALAX) POWD 8/14/2017 Active    STOOL SOFTENER PO  Active    sulfamethoxazole-trimethoprim (BACTRIM DS) 800-160 MG tablet  Active                ALLERGIES  Allergies   Allergen Reactions   • Iodine Hives     dyspnea   • Tequin  "[Gatifloxacin Sesquihydrate] Hives     Severe rash and SOB       PHYSICAL EXAM  VITAL SIGNS: /64 mmHg  Pulse 86  Temp(Src) 35.7 °C (96.3 °F) (Temporal)  Resp 18  Ht 1.626 m (5' 4\")  SpO2 96%  Pulse ox interpretation: I interpret this pulse ox as normal.  Constitutional: Alert and oriented x 3, minimal Distress  HEENT: Atraumatic normocephalic, pupils are equal round reactive to light extraocular movements are intact. The nares is clear, external ears are normal, mouth shows moist mucous membranes  Neck: Supple, no JVD no tracheal deviation  Cardiovascular: Regular rate and rhythm no murmur rub or gallop 2+ pulses peripherally x4  Thorax & Lungs: No respiratory distress, no wheezes rales or rhonchi, No chest tenderness.   GI: Soft nontender nondistended positive bowel sounds, no peritoneal signs  Skin:  Approximately 10 cm area over the dorsum of the right foot that is erythematous indurated warm to the touch with minor vesicular formation at the apex of the wound. No abscess amenable to drainage no proximal streaking or lymphadenopathy.  Musculoskeletal: Moving all extremities with full range and 5 of 5 strength, no acute  deformity  Neurologic: Cranial nerves III through XII are grossly intact, no sensory deficit, no cerebellar dysfunction   Psychiatric: Appropriate affect for situation at this time          COURSE & MEDICAL DECISION MAKING  Pertinent Labs & Imaging studies reviewed. (See chart for details)        Medical Decision Makin-year-old female here with what appears to be a insect envenomation with secondary cellulitis in the dorsum of the right foot she's taken 1 dose of Bactrim at home and she had 1 dose of IM Rocephin given an urgent care. No improvement of symptoms without treatment as increasing pain.  Discussed case with pharmacy patient is a good candidate for Orbative had appropriate blood cultures obtained and was given dose of Orbativ.   Patient slight reddening in the skin and " pruritus induced by antibiotic administration. Discussed with pharmacy that this medication is known to cause similar Ebony syndrome to vancomycin the infusion rate was slowed she was given Benadryl Decadron Tylenol with resolution of symptoms. Due to the adverse effect of this medication and need for ongoing monitoring she was discussed with the [facility] internal medicine service and admitted for further evaluation and treatment. Their help with this patient's greatly appreciated admitted in guarded condition.          FINAL IMPRESSION  1. Cellulitis of right lower extremity     2. Adverse reaction to medication      This dictation has been created using voice recognition software and/or scribes. The accuracy of the dictation is limited by the abilities of the software and the expertise of the scribes. I expect there may be some errors of grammar and possibly content. I made every attempt to manually correct the errors within my dictation. However, errors related to voice recognition software and/or scribes may still exist and should be interpreted within the appropriate context.

## 2017-08-17 NOTE — ED NOTES
Pt by wheelchair to room. C/o cellulitis on R foot from spider bite. Area is red/purple and inflammed. Pt states she is not able to ambulate because the pain is so bad. Pt was seen at  and given IM Rocephin, pt got some relief but pain came back.

## 2017-08-17 NOTE — ASSESSMENT & PLAN NOTE
- today the patient is doing a lot better.  - Improved with antimicrobial therapy and incision and drainage performed at bedside yesterday without complication.   Plan  - DC the patient with advice to follow up with her PCP and returns back to the hospital if her condition is not improving or if it getting worse.  - Continue supportive care at home.

## 2017-08-17 NOTE — ED NOTES
Pt continues to experience reaction sx, pharmacist notified, rate decreased to 50mls/hour and IV benadryl given.

## 2017-08-18 VITALS
TEMPERATURE: 98.5 F | WEIGHT: 140.2 LBS | OXYGEN SATURATION: 98 % | SYSTOLIC BLOOD PRESSURE: 123 MMHG | DIASTOLIC BLOOD PRESSURE: 61 MMHG | HEIGHT: 64 IN | HEART RATE: 72 BPM | RESPIRATION RATE: 14 BRPM | BODY MASS INDEX: 23.93 KG/M2

## 2017-08-18 LAB
ALBUMIN SERPL BCP-MCNC: 3.2 G/DL (ref 3.2–4.9)
ALBUMIN/GLOB SERPL: 1.3 G/DL
ALP SERPL-CCNC: 66 U/L (ref 30–99)
ALT SERPL-CCNC: 9 U/L (ref 2–50)
ANION GAP SERPL CALC-SCNC: 9 MMOL/L (ref 0–11.9)
AST SERPL-CCNC: 13 U/L (ref 12–45)
BASOPHILS # BLD AUTO: 0.1 % (ref 0–1.8)
BASOPHILS # BLD: 0.01 K/UL (ref 0–0.12)
BILIRUB SERPL-MCNC: 0.2 MG/DL (ref 0.1–1.5)
BUN SERPL-MCNC: 19 MG/DL (ref 8–22)
CALCIUM SERPL-MCNC: 8.5 MG/DL (ref 8.5–10.5)
CHLORIDE SERPL-SCNC: 111 MMOL/L (ref 96–112)
CO2 SERPL-SCNC: 21 MMOL/L (ref 20–33)
CREAT SERPL-MCNC: 0.79 MG/DL (ref 0.5–1.4)
EOSINOPHIL # BLD AUTO: 0 K/UL (ref 0–0.51)
EOSINOPHIL NFR BLD: 0 % (ref 0–6.9)
ERYTHROCYTE [DISTWIDTH] IN BLOOD BY AUTOMATED COUNT: 43 FL (ref 35.9–50)
GFR SERPL CREATININE-BSD FRML MDRD: >60 ML/MIN/1.73 M 2
GLOBULIN SER CALC-MCNC: 2.5 G/DL (ref 1.9–3.5)
GLUCOSE BLD-MCNC: 137 MG/DL (ref 65–99)
GLUCOSE SERPL-MCNC: 142 MG/DL (ref 65–99)
HCT VFR BLD AUTO: 32.9 % (ref 37–47)
HGB BLD-MCNC: 10.8 G/DL (ref 12–16)
IMM GRANULOCYTES # BLD AUTO: 0.03 K/UL (ref 0–0.11)
IMM GRANULOCYTES NFR BLD AUTO: 0.4 % (ref 0–0.9)
LYMPHOCYTES # BLD AUTO: 0.87 K/UL (ref 1–4.8)
LYMPHOCYTES NFR BLD: 11.4 % (ref 22–41)
MAGNESIUM SERPL-MCNC: 2 MG/DL (ref 1.5–2.5)
MCH RBC QN AUTO: 29.3 PG (ref 27–33)
MCHC RBC AUTO-ENTMCNC: 32.8 G/DL (ref 33.6–35)
MCV RBC AUTO: 89.2 FL (ref 81.4–97.8)
MONOCYTES # BLD AUTO: 0.47 K/UL (ref 0–0.85)
MONOCYTES NFR BLD AUTO: 6.2 % (ref 0–13.4)
NEUTROPHILS # BLD AUTO: 6.24 K/UL (ref 2–7.15)
NEUTROPHILS NFR BLD: 81.9 % (ref 44–72)
NRBC # BLD AUTO: 0 K/UL
NRBC BLD AUTO-RTO: 0 /100 WBC
PLATELET # BLD AUTO: 232 K/UL (ref 164–446)
PMV BLD AUTO: 10.6 FL (ref 9–12.9)
POTASSIUM SERPL-SCNC: 3.6 MMOL/L (ref 3.6–5.5)
PROT SERPL-MCNC: 5.7 G/DL (ref 6–8.2)
RBC # BLD AUTO: 3.69 M/UL (ref 4.2–5.4)
SODIUM SERPL-SCNC: 141 MMOL/L (ref 135–145)
WBC # BLD AUTO: 7.6 K/UL (ref 4.8–10.8)

## 2017-08-18 PROCEDURE — 85025 COMPLETE CBC W/AUTO DIFF WBC: CPT

## 2017-08-18 PROCEDURE — 700102 HCHG RX REV CODE 250 W/ 637 OVERRIDE(OP): Performed by: EMERGENCY MEDICINE

## 2017-08-18 PROCEDURE — 82962 GLUCOSE BLOOD TEST: CPT

## 2017-08-18 PROCEDURE — A9270 NON-COVERED ITEM OR SERVICE: HCPCS | Performed by: EMERGENCY MEDICINE

## 2017-08-18 PROCEDURE — 700102 HCHG RX REV CODE 250 W/ 637 OVERRIDE(OP): Performed by: STUDENT IN AN ORGANIZED HEALTH CARE EDUCATION/TRAINING PROGRAM

## 2017-08-18 PROCEDURE — 99238 HOSP IP/OBS DSCHRG MGMT 30/<: CPT | Performed by: INTERNAL MEDICINE

## 2017-08-18 PROCEDURE — 83735 ASSAY OF MAGNESIUM: CPT

## 2017-08-18 PROCEDURE — 36415 COLL VENOUS BLD VENIPUNCTURE: CPT

## 2017-08-18 PROCEDURE — 700111 HCHG RX REV CODE 636 W/ 250 OVERRIDE (IP): Performed by: STUDENT IN AN ORGANIZED HEALTH CARE EDUCATION/TRAINING PROGRAM

## 2017-08-18 PROCEDURE — A9270 NON-COVERED ITEM OR SERVICE: HCPCS | Performed by: STUDENT IN AN ORGANIZED HEALTH CARE EDUCATION/TRAINING PROGRAM

## 2017-08-18 PROCEDURE — 80053 COMPREHEN METABOLIC PANEL: CPT

## 2017-08-18 PROCEDURE — 700105 HCHG RX REV CODE 258: Performed by: STUDENT IN AN ORGANIZED HEALTH CARE EDUCATION/TRAINING PROGRAM

## 2017-08-18 RX ADMIN — DULOXETINE HYDROCHLORIDE 30 MG: 30 CAPSULE, DELAYED RELEASE ORAL at 09:25

## 2017-08-18 RX ADMIN — SODIUM CHLORIDE: 9 INJECTION, SOLUTION INTRAVENOUS at 06:33

## 2017-08-18 RX ADMIN — ENOXAPARIN SODIUM 40 MG: 100 INJECTION SUBCUTANEOUS at 09:26

## 2017-08-18 RX ADMIN — ASPIRIN 81 MG: 81 TABLET, CHEWABLE ORAL at 09:25

## 2017-08-18 RX ADMIN — STANDARDIZED SENNA CONCENTRATE AND DOCUSATE SODIUM 2 TABLET: 8.6; 5 TABLET, FILM COATED ORAL at 09:25

## 2017-08-18 RX ADMIN — AMITRIPTYLINE HYDROCHLORIDE 75 MG: 75 TABLET, FILM COATED ORAL at 00:38

## 2017-08-18 RX ADMIN — CLOPIDOGREL 75 MG: 75 TABLET, FILM COATED ORAL at 09:25

## 2017-08-18 RX ADMIN — OMEPRAZOLE 20 MG: 20 CAPSULE, DELAYED RELEASE ORAL at 12:18

## 2017-08-18 RX ADMIN — METOPROLOL TARTRATE 25 MG: 25 TABLET, FILM COATED ORAL at 09:25

## 2017-08-18 NOTE — DISCHARGE INSTRUCTIONS
Discharge Instructions    Discharged to home by car with relative. Discharged via wheelchair, hospital escort: Yes.  Special equipment needed: Not Applicable    Be sure to schedule a follow-up appointment with your primary care doctor or any specialists as instructed.     Discharge Plan:   Diet Plan: Discussed  Activity Level: Discussed  Confirmed Follow up Appointment: Patient to Call and Schedule Appointment  Confirmed Symptoms Management: Discussed  Medication Reconciliation Updated: Yes    I understand that a diet low in cholesterol, fat, and sodium is recommended for good health. Unless I have been given specific instructions below for another diet, I accept this instruction as my diet prescription.   Other diet: regular    Special Instructions: None    · Is patient discharged on Warfarin / Coumadin?   No     · Is patient Post Blood Transfusion?  No    Depression / Suicide Risk    As you are discharged from this [facility] facility, it is important to learn how to keep safe from harming yourself.    Recognize the warning signs:  · Abrupt changes in personality, positive or negative- including increase in energy   · Giving away possessions  · Change in eating patterns- significant weight changes-  positive or negative  · Change in sleeping patterns- unable to sleep or sleeping all the time   · Unwillingness or inability to communicate  · Depression  · Unusual sadness, discouragement and loneliness  · Talk of wanting to die  · Neglect of personal appearance   · Rebelliousness- reckless behavior  · Withdrawal from people/activities they love  · Confusion- inability to concentrate     If you or a loved one observes any of these behaviors or has concerns about self-harm, here's what you can do:  · Talk about it- your feelings and reasons for harming yourself  · Remove any means that you might use to hurt yourself (examples: pills, rope, extension cords, firearm)  · Get professional help from the community (Mental  Health, Substance Abuse, psychological counseling)  · Do not be alone:Call your Safe Contact- someone whom you trust who will be there for you.  · Call your local CRISIS HOTLINE 274-4998 or 152-422-5520  · Call your local Children's Mobile Crisis Response Team [place] (136) 860-9474 or www.[place]  · Call the toll free National Suicide Prevention Hotlines   · National Suicide Prevention Lifeline 309-915-SDAK (6164)  · National [employer] Line Network 800-SUICIDE (333-1812)      Celulitis  (Cellulitis)    La celulitis es ranjeet infección de la piel y del tejido subyacente. El área infectada generalmente está de color fontaine y duele. Ocurre con más frecuencia en los brazos y en las piernas.   CAUSAS   La causa es ranjeet bacteria que ingresa en la piel a través de grietas o ham. Los tipos más frecuentes de bacterias que causan celulitis son los estafilococos y los estreptococos.  SIGNOS Y SÍNTOMAS   · Enrojecimiento y calor.  · Hinchazón.  · Sensibilidad o dolor.  · Fiebre.  DIAGNÓSTICO   Por lo general, el médico puede diagnosticar esta afección con un examen físico. Es posible que le indiquen análisis de gurinder.  TRATAMIENTO   El tratamiento consiste en martine antibióticos.  INSTRUCCIONES PARA EL CUIDADO EN EL HOGAR    · Pastura los antibióticos aiden le indicó el médico. Termine los antibióticos aunque comience a sentirse mejor.  · Mantenga el brazo o la pierna elevados para reducir la hinchazón.  · Aplique paños calientes en la felecia hasta 4 veces al día para calmar el dolor.  · Pastura los medicamentos solamente aiden se lo haya indicado el médico.  · Concurra a todas las visitas de control aiden se lo haya indicado el médico.  SOLICITE ATENCIÓN MÉDICA SI:   · Observa ranjeet línea franki en la piel que sale desde la felecia infectada.  · El área franki se extiende o se vuelve de color oscuro.  · El hueso o la articulación que se encuentran por debajo de la felecia infectada le duelen después de que la piel se alfred.  · La infección se  repite en la misma felecia o en ranjeet felecia diferente.  · Tiene un bulto inflamado en la felecia infectada.  · Presenta nuevos síntomas.  · Tiene fiebre.  SOLICITE ATENCIÓN MÉDICA DE INMEDIATO SI:   · Se siente muy somnoliento.  · Tiene vómitos o diarrea.  · Se siente enfermo (malestar general) con grant musculares.  ASEGÚRESE DE QUE:   · Comprende estas instrucciones.  · Controlará blake afección.  · Recibirá ayuda de inmediato si no mejora o si empeora.     Esta información no tiene aiden fin reemplazar el consejo del médico. Asegúrese de hacerle al médico cualquier pregunta que tenga.     Document Released: 09/27/2006 Document Revised: 01/08/2016  Elsevier Interactive Patient Education ©2016 [employer] Inc.

## 2017-08-18 NOTE — PROGRESS NOTES
Shift report received from night RN, assumed care at 0715. Patient resting in bed with granddaughter at bedside, she is non English speaking so family translates for patient. AOx4, patient not currently expressing any pain at this time, routinely medicated prn per MAR. Pt up as tolerated with assistance, calls appropriately, bed alarm not needed at this time. PIV assessed and running NS @83. Dressing CDI on R foot, O2 1L, SPO2 98%. VTE SCD and lovenox. Plan is to discharge home with family. Discussed POC for multimodal pain management, monitoring VS/labs/I&O, mobility, day time routine, comfort, and safety. Patient has call light and personal belongings within reach. Safety and fall precautions in place. Reviewed current labs, notes, medications, and orders. Hourly rounding in place with RN rounding on odd hours and CNA on even hours.

## 2017-08-18 NOTE — PROGRESS NOTES
Two RN skin assessment complete with MADHAVI Beck. RLE cellulitis with I&D on dorsal foot done in ED today-- covered with foam dressing/ dressing is clean dry and intact. Skin is otherwise intact.

## 2017-08-18 NOTE — CARE PLAN
Problem: Communication  Goal: The ability to communicate needs accurately and effectively will improve  Intervention: Educate patient and significant other/support system about the plan of care, procedures, treatments, medications and allow for questions  Discussed POC, pt communicates questions and poc well. Pt and family understand poc.      Problem: Safety  Goal: Will remain free from injury  Educated on safety measures, using call light ect    Problem: Infection  Goal: Will remain free from infection  Assessed for infx risk, no infx signs

## 2017-08-18 NOTE — PROGRESS NOTES
No complaints of pain / itching/ sore throat or swelling of her tongue. Pt. Is resting comfortably in bed .

## 2017-08-18 NOTE — PROGRESS NOTES
[facility]    Name Navya Mcbride     1935   Age/Sex 82 y.o. female   MRN [id number]   Code Status Full     After 5PM or if no immediate response to page, please call for cross-coverage  Attending/Team: Zodacia Call [phone] to page   1st Call - Day Intern (R1):   Nilson 2nd Call - Day Sr. Resident (R2/R3):   Tang Mejia         Chief complaint/ reason for interval visit  Red man syndrome    Interval Problem Update  Active Hospital Problems    Diagnosis   • Cellulitis [L03.90]   • Red man syndrome [L27.0]         Review of Systems   Constitutional: Positive for chills and malaise/fatigue. Negative for fever.   HENT: Negative for hearing loss.    Eyes: Negative for blurred vision.   Respiratory: Negative for cough.    Cardiovascular: Negative for chest pain and palpitations.   Gastrointestinal: Negative for nausea and vomiting.   Genitourinary: Negative for dysuria and urgency.   Skin: Positive for itching and rash.   Neurological: Positive for weakness. Negative for dizziness, tingling and headaches.   Psychiatric/Behavioral: Negative for depression.       Consultants/Specialty  None    Disposition  24 hour observation, can likely discharge tomorrow    Quality Measures  EKG reviewed, Labs reviewed and Medications reviewed        DVT Prophylaxis: Enoxaparin (Lovenox)                  Physical Exam       Filed Vitals:    17 1430 17 1500 17 1515 17 1630   BP:       Pulse: 100 100 97 96   Temp:       TempSrc:       Resp:  19 16 11   Height:       Weight:       SpO2: 97% 96% 97% 95%     Body mass index is 24.02 kg/(m^2).  Oxygen Therapy:  Pulse Oximetry: 95 %, O2 (LPM): 2, O2 Delivery: Nasal Cannula    Physical Exam   Constitutional: She is oriented to person, place, and time and well-developed, well-nourished, and in no distress. No distress.   HENT:   Head: Normocephalic and atraumatic.   Eyes: Pupils are equal, round,  and reactive to light. Left eye exhibits no discharge. No scleral icterus.   Neck: No JVD present.   Cardiovascular: Normal rate.  Exam reveals no friction rub.    No murmur heard.  Pulmonary/Chest: No respiratory distress. She has no wheezes. She has no rales.   Abdominal: She exhibits no distension. There is no tenderness. There is no rebound.   Musculoskeletal: She exhibits no edema.   Neurological: She is alert and oriented to person, place, and time.   Skin: Skin is warm and dry. She is not diaphoretic.         Lab Data Review:  Recent Results (from the past 24 hour(s))   CBC WITH DIFFERENTIAL    Collection Time: 08/16/17  6:25 PM   Result Value Ref Range    WBC 7.5 4.8 - 10.8 K/uL    RBC 4.09 (L) 4.20 - 5.40 M/uL    Hemoglobin 12.1 12.0 - 16.0 g/dL    Hematocrit 36.8 (L) 37.0 - 47.0 %    MCV 90.0 81.4 - 97.8 fL    MCH 29.6 27.0 - 33.0 pg    MCHC 32.9 (L) 33.6 - 35.0 g/dL    RDW 43.8 35.9 - 50.0 fL    Platelet Count 229 164 - 446 K/uL    MPV 10.4 9.0 - 12.9 fL    Neutrophils-Polys 59.10 44.00 - 72.00 %    Lymphocytes 29.30 22.00 - 41.00 %    Monocytes 9.70 0.00 - 13.40 %    Eosinophils 1.50 0.00 - 6.90 %    Basophils 0.30 0.00 - 1.80 %    Immature Granulocytes 0.10 0.00 - 0.90 %    Nucleated RBC 0.00 /100 WBC    Neutrophils (Absolute) 4.41 2.00 - 7.15 K/uL    Lymphs (Absolute) 2.18 1.00 - 4.80 K/uL    Monos (Absolute) 0.72 0.00 - 0.85 K/uL    Eos (Absolute) 0.11 0.00 - 0.51 K/uL    Baso (Absolute) 0.02 0.00 - 0.12 K/uL    Immature Granulocytes (abs) 0.01 0.00 - 0.11 K/uL    NRBC (Absolute) 0.00 K/uL   CMP    Collection Time: 08/16/17  6:25 PM   Result Value Ref Range    Sodium 137 135 - 145 mmol/L    Potassium 4.4 3.6 - 5.5 mmol/L    Chloride 105 96 - 112 mmol/L    Co2 25 20 - 33 mmol/L    Anion Gap 7.0 0.0 - 11.9    Glucose 91 65 - 99 mg/dL    Bun 19 8 - 22 mg/dL    Creatinine 0.88 0.50 - 1.40 mg/dL    Calcium 9.2 8.5 - 10.5 mg/dL    AST(SGOT) 15 12 - 45 U/L    ALT(SGPT) 8 2 - 50 U/L    Alkaline Phosphatase 77  30 - 99 U/L    Total Bilirubin 0.2 0.1 - 1.5 mg/dL    Albumin 3.6 3.2 - 4.9 g/dL    Total Protein 6.3 6.0 - 8.2 g/dL    Globulin 2.7 1.9 - 3.5 g/dL    A-G Ratio 1.3 g/dL   ESTIMATED GFR    Collection Time: 08/16/17  6:25 PM   Result Value Ref Range    GFR If African American >60 >60 mL/min/1.73 m 2    GFR If Non African American >60 >60 mL/min/1.73 m 2   CRP QUANTITIVE (NON-CARDIAC)    Collection Time: 08/16/17  6:25 PM   Result Value Ref Range    Stat C-Reactive Protein 5.23 (H) 0.00 - 0.75 mg/dL   BLOOD CULTURE    Collection Time: 08/16/17 11:05 PM   Result Value Ref Range    Significant Indicator NEG     Source BLD     Site PERIPHERAL     Blood Culture       No Growth    Note: Blood cultures are incubated for 5 days and  are monitored continuously.Positive blood cultures  are called to the RN and reported as soon as  they are identified.     BLOOD CULTURE    Collection Time: 08/16/17 11:10 PM   Result Value Ref Range    Significant Indicator NEG     Source BLD     Site PERIPHERAL     Blood Culture       No Growth    Note: Blood cultures are incubated for 5 days and  are monitored continuously.Positive blood cultures  are called to the RN and reported as soon as  they are identified.         Assessment/Plan   Cellulitis of left lower extremity  Abscess, 2cm  Improved with antimicrobial therapy and incision and drainage performed at bedside today without complication. The patient had been intermittently tachycardic with SBP in the 90's which I believe is more likely related to mild reaction to antibiotic than it is sepsis picture.    Plan  - Continue supportive care.   - S/p oritavancin  - 24 hour observation  - S/p incision and drainage  - CTM left foot    Red man syndrome  Complained of rash in the back, itching, dry throat, swollen tongue on infusion with ortiavancin. She continues to be slightly tachycardic and SBP has been in the 90s.  Given her age, vital sign abnormalities and reaction to antibiotic  (including lip swelling) I believe she needs 24 hour monitoring to ensure patient safety.       Plan:  - 24 hour observation  - Benadryl as needed  -  treat with diphenhydramine (50 mg intravenously) as needed  - ranitidine (50 mg intravenously) as needed   - IV fluids for hypotension  - Can discharge tomorrow if stable for 24 hours

## 2017-08-19 NOTE — ASSESSMENT & PLAN NOTE
- patient condition is improving  - not inflamed, red, or tender.  - discharge home to follow up with her PCP.

## 2017-08-19 NOTE — DISCHARGE SUMMARY
"        Internal Medicine Discharge Summary      Admit Date:  8/16/2017       Discharge Date:   8/18/2017    Service:   [facility] [place] Team  Attending Physician(s):   Ciara       Senior Resident(s):   Tang Mejia  Denys Resident(s):   Pascual Mednosa      Primary Diagnosis:   Right foot cellulitis with pain and edema    Secondary Diagnoses:                Active Problems:    Cellulitis of left lower extremity POA: Yes    Red man syndrome POA: No    Abscess POA: Unknown      Overview: Small 2cm, s/p incision and drainage without complication.      Hospital Summary (Brief Narrative):       A 82 years female with past medical history of fibromyalgia, colitis, and pulmonary embolism. Presented to the emergency department with right foot cellulitis. The cellulitis failed bactrim outpatient treatment that was started on the 8/14/2017, she was having progressive increase in pain, edema and redness of the right foot. In the ED patient was given IV oritavancin (ORBATIV) 1,200 mg, [ Per pharmacy \"a lipophilic anti MRSA medication, that stays in the system for 10 days after a single injection\"], during the infusion the patient developed maculopapular rash, flushed and itching, with drop in blood pressure, however without shortness of breath, wheeze, dizziness or confusion. Symptoms improved with slowing the infusion rate, a presumed diagnoses of red man syndrome was made.the infectious disease specialist requested admission for observation.   Today the patient condition is stable, the maculopapular rash has diminished, and the right foot cellulitis is improving, her vital signs return back to normal.      Patient /Hospital Summary (Details -- Problem Oriented) :          Cellulitis of left lower extremity  - Started on the 8/12/2014  - Failed oral Bactrim antibiotics which the patient received on the 8/14/2017.  - Patient admitted to the emergency room on 8/16/2017 for cellulitis in right foot.  - Patient " received I.V antibiotics.  - Improved with antimicrobial therapy and incision and drainage performed at bedside on 8/17/2017 without complication.   - Patient condition is improved.  Plan  - DC the patient with advice to follow up with her PCP and returns back to the hospital if her condition is not improving or if it getting worse.  - Continue supportive care at home.    Abscess  - incision and drainage performed at bedside on 8/17/2017 without complication.   - patient condition is improving  - not inflamed, red, or tender.  - discharge home to follow up with her PCP.    Red man syndrome  - Patient had allergic reaction to IV oritavancin.  _ Patient condition improved by slow the infusion.  - No itching or rash has been noticed today  - resolved.    Plan:  - Benadryl as needed at home.          Consultants:     none    Procedures:        none    Imaging/ Testing:    No orders to display         Discharge Medications:         Medication Reconciliation: Completed       Medication List      CHANGE how you take these medications       Instructions    metoprolol 25 MG Tabs   What changed:  how much to take   Last time this was given:  25 mg on 8/18/2017  9:25 AM   Commonly known as:  LOPRESSOR    Take 1 Tab by mouth 2 times a day.   Dose:  25 mg         CONTINUE taking these medications       Instructions    ADVIL 200 MG Tabs   Generic drug:  ibuprofen    Take 400 mg by mouth every day.   Dose:  400 mg       albuterol 108 (90 Base) MCG/ACT Aers inhalation aerosol    Inhale 2 Puffs by mouth every 6 hours as needed for Shortness of Breath.   Dose:  2 Puff       AMITIZA 8 MCG Caps   Generic drug:  Lubiprostone    Take 8 mcg by mouth 2 Times a Day.   Dose:  8 mcg       aspirin 81 MG tablet    Take 81 mg by mouth every day.   Dose:  81 mg       * lorazepam 0.5 MG Tabs   Commonly known as:  ATIVAN    Take 0.5 mg by mouth every morning.   Dose:  0.5 mg       * ATIVAN 2 MG tablet   Generic drug:  lorazepam    Take 2 mg by  mouth at bedtime as needed. bedtime   Dose:  2 mg       atorvastatin 40 MG Tabs   Last time this was given:  40 mg on 8/17/2017 10:02 PM   Commonly known as:  LIPITOR    Take 40 mg by mouth every evening.   Dose:  40 mg       celecoxib 200 MG Caps   Commonly known as:  CELEBREX    Take 200 mg by mouth 2 times a day.   Dose:  200 mg       CRANBERRY    400 mg by Does not apply route 2 Times a Day.   Dose:  400 mg       CYMBALTA 30 MG Cpep   Last time this was given:  30 mg on 8/18/2017  9:25 AM   Generic drug:  duloxetine    Take 30 mg by mouth every day.   Dose:  30 mg       Diclofenac Sodium 1 % Gel    Apply 1 g to skin as directed See Admin Instructions. Applies several times daily, to several areas of her body   Dose:  1 g       docusate sodium 100 MG Caps   Commonly known as:  COLACE    Take 100 mg by mouth 2 times a day.   Dose:  100 mg       hydrocodone-acetaminophen 7.5-325 MG per tablet   Commonly known as:  NORCO    Take 2 Tabs by mouth 2 Times a Day.   Dose:  2 Tab       latanoprost 0.005 % Soln   Commonly known as:  XALATAN    Place 1 Drop in both eyes every evening.   Dose:  1 Drop       MIRALAX Powd   Generic drug:  polyethylene glycol 3350    Take 17 g by mouth every day.   Dose:  17 g       NEXIUM 40 MG delayed-release capsule   Generic drug:  esomeprazole    Take 40 mg by mouth every morning before breakfast.   Dose:  40 mg       nitroglycerin 0.4 MG Subl   Commonly known as:  NITROSTAT    Place 1 Tab under tongue as needed for Chest Pain.   Dose:  0.4 mg       PLAVIX 75 MG Tabs   Last time this was given:  75 mg on 8/18/2017  9:25 AM   Generic drug:  clopidogrel    Take 75 mg by mouth every day.   Dose:  75 mg       RESTASIS 0.05 % ophthalmic emulsion   Generic drug:  cyclosporin    Place 1 Drop in both eyes 2 times a day.   Dose:  1 Drop       sulfamethoxazole-trimethoprim 800-160 MG tablet   Commonly known as:  BACTRIM DS    Take 1 Tab by mouth 2 times a day.   Dose:  1 Tab       * Notice:  This  list has 2 medication(s) that are the same as other medications prescribed for you. Read the directions carefully, and ask your doctor or other care provider to review them with you.          Can use .DISCHARGEMEDSLIST if going to another facility         Disposition:   home    Diet:   healthy    Activity:   As tolerated    Instructions:      Follow up with your PCP   The patient was instructed to return to the ER in the event of worsening symptoms. I have counseled the patient on the importance of compliance and the patient has agreed to proceed with all medical recommendations and follow up plan indicated above.   The patient understands that all medications come with benefits and risks. Risks may include permanent injury or death and these risks can be minimized with close reassessment and monitoring.        Primary Care Provider:    Thom Rodriguez M.D.    Discharge summary faxed to primary care provider:  Completed  Copy of discharge summary given to the patient: Completed      Follow up appointment details :          Pending Studies:        none    Time spent on discharge day patient visit, preparing discharge paperwork and arranging for patient follow up.        Discharge Time (Minutes) :    60 minutes        Condition on Discharge    __stable____________________________________________________________________    Interval history/exam for day of discharge:     No acute events. Feels he is at his baseline. He is eager to discharge. Offered SNF which he politely declined stating he is doing well with home health.    Filed Vitals:    08/18/17 0400 08/18/17 0800 08/18/17 0925 08/18/17 1200   BP: 108/86 110/54 110/54 123/61   Pulse: 83 74 74 72   Temp: 36.7 °C (98.1 °F) 36 °C (96.8 °F)  36.9 °C (98.5 °F)   TempSrc:       Resp: 16 14  14   Height:       Weight:       SpO2: 96% 97%  98%     Weight/BMI: Body mass index is 24.05 kg/(m^2).  Pulse Oximetry: 98 %, O2 (LPM): 0, O2 Delivery: Nasal Cannula    General:  NAD  CVS: RRR  PULM: CTAB    Most Recent Labs:    Lab Results   Component Value Date/Time    WBC 7.6 08/18/2017 01:44 AM    RBC 3.69* 08/18/2017 01:44 AM    HEMOGLOBIN 10.8* 08/18/2017 01:44 AM    HEMATOCRIT 32.9* 08/18/2017 01:44 AM    MCV 89.2 08/18/2017 01:44 AM    MCH 29.3 08/18/2017 01:44 AM    MCHC 32.8* 08/18/2017 01:44 AM    MPV 10.6 08/18/2017 01:44 AM    NEUTROPHILS-POLYS 81.90* 08/18/2017 01:44 AM    LYMPHOCYTES 11.40* 08/18/2017 01:44 AM    MONOCYTES 6.20 08/18/2017 01:44 AM    EOSINOPHILS 0.00 08/18/2017 01:44 AM    BASOPHILS 0.10 08/18/2017 01:44 AM    HYPOCHROMIA 1+ 10/20/2009 08:50 AM    ANISOCYTOSIS 1+ 10/12/2009 03:30 AM      Lab Results   Component Value Date/Time    SODIUM 141 08/18/2017 01:44 AM    POTASSIUM 3.6 08/18/2017 01:44 AM    CHLORIDE 111 08/18/2017 01:44 AM    CO2 21 08/18/2017 01:44 AM    GLUCOSE 142* 08/18/2017 01:44 AM    BUN 19 08/18/2017 01:44 AM    CREATININE 0.79 08/18/2017 01:44 AM      Lab Results   Component Value Date/Time    ALT(SGPT) 9 08/18/2017 01:44 AM    AST(SGOT) 13 08/18/2017 01:44 AM    ALKALINE PHOSPHATASE 66 08/18/2017 01:44 AM    TOTAL BILIRUBIN 0.2 08/18/2017 01:44 AM    DIRECT BILIRUBIN 0.1 03/14/2009 01:16 PM    LIPASE 41 05/07/2017 05:45 PM    ALBUMIN 3.2 08/18/2017 01:44 AM    GLOBULIN 2.5 08/18/2017 01:44 AM    INR 1.00 05/07/2017 05:45 PM    MACROCYTOSIS 1+ 10/20/2009 08:50 AM     Lab Results   Component Value Date/Time    PT 13.5 05/07/2017 05:45 PM    INR 1.00 05/07/2017 05:45 PM

## 2017-08-22 LAB
BACTERIA BLD CULT: NORMAL
BACTERIA BLD CULT: NORMAL
SIGNIFICANT IND 70042: NORMAL
SIGNIFICANT IND 70042: NORMAL
SITE SITE: NORMAL
SITE SITE: NORMAL
SOURCE SOURCE: NORMAL
SOURCE SOURCE: NORMAL

## 2018-03-29 ENCOUNTER — HOSPITAL ENCOUNTER (OUTPATIENT)
Dept: LAB | Facility: MEDICAL CENTER | Age: 83
End: 2018-03-29
Attending: SPECIALIST
Payer: MEDICARE

## 2018-03-29 LAB
ALBUMIN SERPL BCP-MCNC: 3.7 G/DL (ref 3.2–4.9)
ALBUMIN/GLOB SERPL: 1.4 G/DL
ALP SERPL-CCNC: 68 U/L (ref 30–99)
ALT SERPL-CCNC: 10 U/L (ref 2–50)
ANION GAP SERPL CALC-SCNC: 6 MMOL/L (ref 0–11.9)
AST SERPL-CCNC: 15 U/L (ref 12–45)
BASOPHILS # BLD AUTO: 0.5 % (ref 0–1.8)
BASOPHILS # BLD: 0.03 K/UL (ref 0–0.12)
BILIRUB SERPL-MCNC: 0.3 MG/DL (ref 0.1–1.5)
BUN SERPL-MCNC: 24 MG/DL (ref 8–22)
CALCIUM SERPL-MCNC: 9.1 MG/DL (ref 8.5–10.5)
CHLORIDE SERPL-SCNC: 108 MMOL/L (ref 96–112)
CK SERPL-CCNC: 60 U/L (ref 0–154)
CO2 SERPL-SCNC: 25 MMOL/L (ref 20–33)
CREAT SERPL-MCNC: 0.88 MG/DL (ref 0.5–1.4)
EOSINOPHIL # BLD AUTO: 0.03 K/UL (ref 0–0.51)
EOSINOPHIL NFR BLD: 0.5 % (ref 0–6.9)
ERYTHROCYTE [DISTWIDTH] IN BLOOD BY AUTOMATED COUNT: 43.5 FL (ref 35.9–50)
ERYTHROCYTE [SEDIMENTATION RATE] IN BLOOD BY WESTERGREN METHOD: 13 MM/HOUR (ref 0–30)
FOLATE SERPL-MCNC: 6.7 NG/ML
GLOBULIN SER CALC-MCNC: 2.7 G/DL (ref 1.9–3.5)
GLUCOSE SERPL-MCNC: 99 MG/DL (ref 65–99)
HCT VFR BLD AUTO: 39.2 % (ref 37–47)
HGB BLD-MCNC: 13.2 G/DL (ref 12–16)
IMM GRANULOCYTES # BLD AUTO: 0.02 K/UL (ref 0–0.11)
IMM GRANULOCYTES NFR BLD AUTO: 0.3 % (ref 0–0.9)
LYMPHOCYTES # BLD AUTO: 1.89 K/UL (ref 1–4.8)
LYMPHOCYTES NFR BLD: 32 % (ref 22–41)
MCH RBC QN AUTO: 30.1 PG (ref 27–33)
MCHC RBC AUTO-ENTMCNC: 33.7 G/DL (ref 33.6–35)
MCV RBC AUTO: 89.5 FL (ref 81.4–97.8)
MONOCYTES # BLD AUTO: 0.45 K/UL (ref 0–0.85)
MONOCYTES NFR BLD AUTO: 7.6 % (ref 0–13.4)
NEUTROPHILS # BLD AUTO: 3.48 K/UL (ref 2–7.15)
NEUTROPHILS NFR BLD: 59.1 % (ref 44–72)
NRBC # BLD AUTO: 0 K/UL
NRBC BLD-RTO: 0 /100 WBC
PLATELET # BLD AUTO: 239 K/UL (ref 164–446)
PMV BLD AUTO: 10.5 FL (ref 9–12.9)
POTASSIUM SERPL-SCNC: 4.1 MMOL/L (ref 3.6–5.5)
PROT SERPL-MCNC: 6.4 G/DL (ref 6–8.2)
RBC # BLD AUTO: 4.38 M/UL (ref 4.2–5.4)
RHEUMATOID FACT SER IA-ACNC: <10 IU/ML (ref 0–14)
SODIUM SERPL-SCNC: 139 MMOL/L (ref 135–145)
T4 SERPL-MCNC: 5.4 UG/DL (ref 4–12)
VIT B12 SERPL-MCNC: >1500 PG/ML (ref 211–911)
WBC # BLD AUTO: 5.9 K/UL (ref 4.8–10.8)

## 2018-03-29 PROCEDURE — 85652 RBC SED RATE AUTOMATED: CPT

## 2018-03-29 PROCEDURE — 85025 COMPLETE CBC W/AUTO DIFF WBC: CPT

## 2018-03-29 PROCEDURE — 82607 VITAMIN B-12: CPT

## 2018-03-29 PROCEDURE — 86038 ANTINUCLEAR ANTIBODIES: CPT

## 2018-03-29 PROCEDURE — 84436 ASSAY OF TOTAL THYROXINE: CPT

## 2018-03-29 PROCEDURE — 36415 COLL VENOUS BLD VENIPUNCTURE: CPT

## 2018-03-29 PROCEDURE — 82746 ASSAY OF FOLIC ACID SERUM: CPT

## 2018-03-29 PROCEDURE — 86431 RHEUMATOID FACTOR QUANT: CPT

## 2018-03-29 PROCEDURE — 82550 ASSAY OF CK (CPK): CPT

## 2018-03-29 PROCEDURE — 80053 COMPREHEN METABOLIC PANEL: CPT

## 2018-03-31 LAB — NUCLEAR IGG SER QL IA: NORMAL

## 2019-03-21 ENCOUNTER — OFFICE VISIT (OUTPATIENT)
Dept: URGENT CARE | Facility: PHYSICIAN GROUP | Age: 84
End: 2019-03-21
Payer: MEDICARE

## 2019-03-21 VITALS
WEIGHT: 145 LBS | RESPIRATION RATE: 15 BRPM | OXYGEN SATURATION: 90 % | SYSTOLIC BLOOD PRESSURE: 116 MMHG | TEMPERATURE: 99.1 F | HEART RATE: 90 BPM | HEIGHT: 61 IN | BODY MASS INDEX: 27.38 KG/M2 | DIASTOLIC BLOOD PRESSURE: 64 MMHG

## 2019-03-21 DIAGNOSIS — E86.0 DEHYDRATION: ICD-10-CM

## 2019-03-21 DIAGNOSIS — R52 GENERALIZED BODY ACHES: ICD-10-CM

## 2019-03-21 DIAGNOSIS — Z71.1 WORRIED WELL: ICD-10-CM

## 2019-03-21 PROCEDURE — 99214 OFFICE O/P EST MOD 30 MIN: CPT | Performed by: NURSE PRACTITIONER

## 2019-03-23 ASSESSMENT — ENCOUNTER SYMPTOMS
FEVER: 0
BODY ACHES: 1
COUGH: 0
MYALGIAS: 1

## 2019-03-23 NOTE — PROGRESS NOTES
"Subjective:      Navya Mcbride is a 83 y.o. female who presents with Headache (neck pain x 1 month )            Generalized Body Aches   This is a new problem. Episode onset: Pt is accompanied by her two daughters. They serve as the historians for their mother as she does not speak English.  Associated symptoms include myalgias. Pertinent negatives include no chest pain, congestion, coughing or fever. Associated symptoms comments: Her daughters reports several different complaints today. First, they report she is having body aches and slight headaches recently. One of her daughters thinks she is urinating more frequently that she should. She also took her BP at home and was slightly elevated at 150/90. Concerned she might have a UTI. They are also requesting she receive blood work today as it has been a while and they want to change her PCP so they don't have to drive her to [facility] anymore. She has tried rest for the symptoms. The treatment provided no relief.       Review of Systems   Constitutional: Negative for fever.   HENT: Negative for congestion.    Respiratory: Negative for cough.    Cardiovascular: Negative for chest pain.   Genitourinary: Negative for dysuria.   Musculoskeletal: Positive for myalgias.   All other systems reviewed and are negative.    Past Medical History:   Diagnosis Date   • Anemia     chronic   • Anesthesia     \"hard to wake up\"   • Arthritis     hands, states RA and Osteo   • Asthma     uses inhaler   • Backpain     compressed discs   • Breath shortness     oxygen 2L N/C prn   • CATARACT    • Clostridium difficile infection 2009   • Coarse tremors    • Colitis    • Dental disorder     chronic gum infection   • Fibromyalgia    • Gastritis    • Heart burn    • Hiatus hernia syndrome    • Indigestion    • Myocardial infarct (HCC) 5/2015    silent MI   • Osteoporosis    • Other specified disorder of intestines     colitis, bloating, constipation   • Pain     fibromyalgia   • " "Pneumonia     4/4/2011   • Psychiatric problem     depression   • Pulmonary embolism (HCC)     left lung   • Stroke (HCC) 2009    right weakness   • Urinary bladder disorder     hematuria chronic      Past Surgical History:   Procedure Laterality Date   • RECOVERY  6/11/2015    Procedure:  CATH LAB  [facility] WITH KATHERINE DUBOIS ICD; 794.30;  Surgeon: Recoveryonly Surgery;  Location: SURGERY PRE-POST PROC UNIT [facility] [place];  Service:    • KNEE ARTHROSCOPY  10/8/2009    Performed by JAYSHREE VIVEROS at SURGERY [facility] ORS   • MEDIAL MENISCECTOMY  10/8/2009    Performed by JAYSHREE VIVEROS at SURGERY [facility] ORS   • MENISCECTOMY  10/8/2009    Performed by JAYSHREE VIVEROS at SURGERY [facility] ORS   • ALHAJI BY LAPAROSCOPY  4/19/2009    Performed by GANSER, JOHN H at SURGERY [facility] ORS   • ROTATOR CUFF REPAIR  2000    left   • HYSTERECTOMY, TOTAL ABDOMINAL  1973      Social History     Social History   • Marital status:      Spouse name: N/A   • Number of children: N/A   • Years of education: N/A     Occupational History   • Not on file.     Social History Main Topics   • Smoking status: Never Smoker   • Smokeless tobacco: Never Used   • Alcohol use No   • Drug use: No   • Sexual activity: Not on file     Other Topics Concern   • Not on file     Social History Narrative   • No narrative on file          Objective:     /64   Pulse 90   Temp 37.3 °C (99.1 °F) (Temporal)   Resp 15   Ht 1.549 m (5' 1\")   Wt 65.8 kg (145 lb)   SpO2 90%   BMI 27.40 kg/m²      Physical Exam   Constitutional: She is oriented to person, place, and time. Vital signs are normal. She appears well-developed and well-nourished.   HENT:   Head: Normocephalic and atraumatic.   Right Ear: Tympanic membrane and external ear normal.   Left Ear: Tympanic membrane and external ear normal.   Nose: Nose normal.   Eyes: Pupils are equal, round, and reactive to light. EOM are normal.   Neck: Normal range of motion.   Cardiovascular: " Normal rate and regular rhythm.    Pulmonary/Chest: Effort normal and breath sounds normal.   Abdominal: Soft. Normal appearance and bowel sounds are normal. There is no tenderness. There is no CVA tenderness.   Musculoskeletal: Normal range of motion.   Neurological: She is alert and oriented to person, place, and time.   Skin: Skin is warm and dry. Capillary refill takes less than 2 seconds.   Psychiatric: She has a normal mood and affect. Her speech is normal and behavior is normal. Thought content normal.   Vitals reviewed.         UA- grossly normal, specific gravity greater than 1.030     Assessment/Plan:     1. Generalized body aches  - POCT Urinalysis    2. Dehydration  - VITAMIN B12; Future  - WESTERGREN SED RATE; Future  - FOLATE; Future  - RHEUMATOID ARTHRITIS FACTOR; Future  - CREATINE KINASE; Future  - Comp Metabolic Panel; Future  - CBC WITH DIFFERENTIAL; Future    3. Worried well  - VITAMIN B12; Future  - WESTERGREN SED RATE; Future  - FOLATE; Future  - RHEUMATOID ARTHRITIS FACTOR; Future  - CREATINE KINASE; Future  - Comp Metabolic Panel; Future  - CBC WITH DIFFERENTIAL; Future  - REFERRAL TO FAMILY PRACTICE    During exam pt is pointing to many different places that bother her, none of which seem to be associated with the other. Her exam is grossly normal, it could be suggested that she is dehydrated due to the specific gravity of her urine. Also, pt does have a hx of fibromyalgia, so many of her symptoms may be attributed to this   Advised her daughters to increase her water intake  Her daughters are also concerned that she is not eating and has lost 10 lbs in the last 2 months. I advised her daughters that she needs to have her routine blood work done and follow up with a primary to discuss her long term problems and possible solutions  There is no concern for a UTI today  She needs to drink more water  Her PCP will review labs with her, I will call and inform her if there are any significant  abnormals  Strict ER precautions for worsening condition  Supportive care, differential diagnoses, and indications for immediate follow-up discussed with patient.    Pathogenesis of diagnosis discussed including typical length and natural progression.      Instructed to return to UC or nearest emergency department if symptoms fail to improve, for any change in condition, further concerns, or new concerning symptoms.  Patient states understanding of the plan of care and discharge instructions.

## 2019-03-26 LAB
ALBUMIN SERPL-MCNC: 4.2 G/DL (ref 3.5–4.7)
ALBUMIN/GLOB SERPL: 1.8 {RATIO} (ref 1.2–2.2)
ALP SERPL-CCNC: 72 IU/L (ref 39–117)
ALT SERPL-CCNC: 12 IU/L (ref 0–32)
AST SERPL-CCNC: 25 IU/L (ref 0–40)
BASOPHILS # BLD AUTO: 0 X10E3/UL (ref 0–0.2)
BASOPHILS NFR BLD AUTO: 0 %
BILIRUB SERPL-MCNC: 0.5 MG/DL (ref 0–1.2)
BUN SERPL-MCNC: 23 MG/DL (ref 8–27)
BUN/CREAT SERPL: 29 (ref 12–28)
CALCIUM SERPL-MCNC: 9.8 MG/DL (ref 8.7–10.3)
CHLORIDE SERPL-SCNC: 107 MMOL/L (ref 96–106)
CK SERPL-CCNC: 109 U/L (ref 24–173)
CO2 SERPL-SCNC: 21 MMOL/L (ref 20–29)
CREAT SERPL-MCNC: 0.78 MG/DL (ref 0.57–1)
EOSINOPHIL # BLD AUTO: 0 X10E3/UL (ref 0–0.4)
EOSINOPHIL NFR BLD AUTO: 1 %
ERYTHROCYTE [DISTWIDTH] IN BLOOD BY AUTOMATED COUNT: 14.1 % (ref 12.3–15.4)
ERYTHROCYTE [SEDIMENTATION RATE] IN BLOOD BY WESTERGREN METHOD: 9 MM/HR (ref 0–40)
FOLATE SERPL-MCNC: 13.5 NG/ML
GLOBULIN SER CALC-MCNC: 2.3 G/DL (ref 1.5–4.5)
GLUCOSE SERPL-MCNC: 97 MG/DL (ref 65–99)
HCT VFR BLD AUTO: 39.3 % (ref 34–46.6)
HGB BLD-MCNC: 13.2 G/DL (ref 11.1–15.9)
IMM GRANULOCYTES # BLD AUTO: 0 X10E3/UL (ref 0–0.1)
IMM GRANULOCYTES NFR BLD AUTO: 0 %
IMMATURE CELLS  115398: NORMAL
LYMPHOCYTES # BLD AUTO: 1.7 X10E3/UL (ref 0.7–3.1)
LYMPHOCYTES NFR BLD AUTO: 34 %
MCH RBC QN AUTO: 29.1 PG (ref 26.6–33)
MCHC RBC AUTO-ENTMCNC: 33.6 G/DL (ref 31.5–35.7)
MCV RBC AUTO: 87 FL (ref 79–97)
MONOCYTES # BLD AUTO: 0.4 X10E3/UL (ref 0.1–0.9)
MONOCYTES NFR BLD AUTO: 7 %
MORPHOLOGY BLD-IMP: NORMAL
NEUTROPHILS # BLD AUTO: 2.9 X10E3/UL (ref 1.4–7)
NEUTROPHILS NFR BLD AUTO: 58 %
NRBC BLD AUTO-RTO: NORMAL %
PLATELET # BLD AUTO: 255 X10E3/UL (ref 150–379)
POTASSIUM SERPL-SCNC: 4.2 MMOL/L (ref 3.5–5.2)
PROT SERPL-MCNC: 6.5 G/DL (ref 6–8.5)
RBC # BLD AUTO: 4.53 X10E6/UL (ref 3.77–5.28)
RHEUMATOID FACT SERPL-ACNC: 10.6 IU/ML (ref 0–13.9)
SODIUM SERPL-SCNC: 142 MMOL/L (ref 134–144)
VIT B12 SERPL-MCNC: 1788 PG/ML (ref 232–1245)
WBC # BLD AUTO: 5 X10E3/UL (ref 3.4–10.8)

## 2019-05-20 ENCOUNTER — HOSPITAL ENCOUNTER (OUTPATIENT)
Facility: MEDICAL CENTER | Age: 84
End: 2019-05-21
Attending: EMERGENCY MEDICINE | Admitting: INTERNAL MEDICINE
Payer: MEDICARE

## 2019-05-20 ENCOUNTER — APPOINTMENT (OUTPATIENT)
Dept: RADIOLOGY | Facility: MEDICAL CENTER | Age: 84
End: 2019-05-20
Attending: EMERGENCY MEDICINE
Payer: MEDICARE

## 2019-05-20 DIAGNOSIS — L30.9 ECZEMA, UNSPECIFIED TYPE: ICD-10-CM

## 2019-05-20 DIAGNOSIS — R63.8 DECREASED ORAL INTAKE: ICD-10-CM

## 2019-05-20 DIAGNOSIS — H93.12 TINNITUS OF LEFT EAR: ICD-10-CM

## 2019-05-20 DIAGNOSIS — R07.9 CHEST PAIN WITH MODERATE RISK FOR CARDIAC ETIOLOGY: ICD-10-CM

## 2019-05-20 LAB
ALBUMIN SERPL BCP-MCNC: 4.1 G/DL (ref 3.2–4.9)
ALBUMIN/GLOB SERPL: 1.6 G/DL
ALP SERPL-CCNC: 59 U/L (ref 30–99)
ALT SERPL-CCNC: 14 U/L (ref 2–50)
ANION GAP SERPL CALC-SCNC: 5 MMOL/L (ref 0–11.9)
AST SERPL-CCNC: 21 U/L (ref 12–45)
BASOPHILS # BLD AUTO: 0.7 % (ref 0–1.8)
BASOPHILS # BLD: 0.04 K/UL (ref 0–0.12)
BILIRUB SERPL-MCNC: 0.3 MG/DL (ref 0.1–1.5)
BUN SERPL-MCNC: 19 MG/DL (ref 8–22)
CALCIUM SERPL-MCNC: 9.4 MG/DL (ref 8.5–10.5)
CHLORIDE SERPL-SCNC: 107 MMOL/L (ref 96–112)
CO2 SERPL-SCNC: 27 MMOL/L (ref 20–33)
CREAT SERPL-MCNC: 0.77 MG/DL (ref 0.5–1.4)
EKG IMPRESSION: NORMAL
EOSINOPHIL # BLD AUTO: 0.04 K/UL (ref 0–0.51)
EOSINOPHIL NFR BLD: 0.7 % (ref 0–6.9)
ERYTHROCYTE [DISTWIDTH] IN BLOOD BY AUTOMATED COUNT: 43.9 FL (ref 35.9–50)
GLOBULIN SER CALC-MCNC: 2.5 G/DL (ref 1.9–3.5)
GLUCOSE SERPL-MCNC: 97 MG/DL (ref 65–99)
HCT VFR BLD AUTO: 42.7 % (ref 37–47)
HGB BLD-MCNC: 13.5 G/DL (ref 12–16)
IMM GRANULOCYTES # BLD AUTO: 0.01 K/UL (ref 0–0.11)
IMM GRANULOCYTES NFR BLD AUTO: 0.2 % (ref 0–0.9)
LYMPHOCYTES # BLD AUTO: 2.05 K/UL (ref 1–4.8)
LYMPHOCYTES NFR BLD: 34.7 % (ref 22–41)
MCH RBC QN AUTO: 28.8 PG (ref 27–33)
MCHC RBC AUTO-ENTMCNC: 31.6 G/DL (ref 33.6–35)
MCV RBC AUTO: 91.2 FL (ref 81.4–97.8)
MONOCYTES # BLD AUTO: 0.48 K/UL (ref 0–0.85)
MONOCYTES NFR BLD AUTO: 8.1 % (ref 0–13.4)
NEUTROPHILS # BLD AUTO: 3.29 K/UL (ref 2–7.15)
NEUTROPHILS NFR BLD: 55.6 % (ref 44–72)
NRBC # BLD AUTO: 0 K/UL
NRBC BLD-RTO: 0 /100 WBC
PLATELET # BLD AUTO: 245 K/UL (ref 164–446)
PMV BLD AUTO: 10.9 FL (ref 9–12.9)
POTASSIUM SERPL-SCNC: 4.7 MMOL/L (ref 3.6–5.5)
PROT SERPL-MCNC: 6.6 G/DL (ref 6–8.2)
RBC # BLD AUTO: 4.68 M/UL (ref 4.2–5.4)
SODIUM SERPL-SCNC: 139 MMOL/L (ref 135–145)
TROPONIN I SERPL-MCNC: <0.01 NG/ML (ref 0–0.04)
TROPONIN I SERPL-MCNC: <0.01 NG/ML (ref 0–0.04)
WBC # BLD AUTO: 5.9 K/UL (ref 4.8–10.8)

## 2019-05-20 PROCEDURE — A9270 NON-COVERED ITEM OR SERVICE: HCPCS | Performed by: INTERNAL MEDICINE

## 2019-05-20 PROCEDURE — 71045 X-RAY EXAM CHEST 1 VIEW: CPT

## 2019-05-20 PROCEDURE — G0378 HOSPITAL OBSERVATION PER HR: HCPCS

## 2019-05-20 PROCEDURE — 96375 TX/PRO/DX INJ NEW DRUG ADDON: CPT

## 2019-05-20 PROCEDURE — 84484 ASSAY OF TROPONIN QUANT: CPT

## 2019-05-20 PROCEDURE — 80053 COMPREHEN METABOLIC PANEL: CPT

## 2019-05-20 PROCEDURE — 93005 ELECTROCARDIOGRAM TRACING: CPT

## 2019-05-20 PROCEDURE — 700111 HCHG RX REV CODE 636 W/ 250 OVERRIDE (IP): Performed by: EMERGENCY MEDICINE

## 2019-05-20 PROCEDURE — 99285 EMERGENCY DEPT VISIT HI MDM: CPT

## 2019-05-20 PROCEDURE — 99220 PR INITIAL OBSERVATION CARE,LEVL III: CPT | Performed by: INTERNAL MEDICINE

## 2019-05-20 PROCEDURE — 96374 THER/PROPH/DIAG INJ IV PUSH: CPT

## 2019-05-20 PROCEDURE — 700102 HCHG RX REV CODE 250 W/ 637 OVERRIDE(OP): Performed by: INTERNAL MEDICINE

## 2019-05-20 PROCEDURE — 36415 COLL VENOUS BLD VENIPUNCTURE: CPT

## 2019-05-20 PROCEDURE — 93005 ELECTROCARDIOGRAM TRACING: CPT | Performed by: EMERGENCY MEDICINE

## 2019-05-20 PROCEDURE — 85025 COMPLETE CBC W/AUTO DIFF WBC: CPT

## 2019-05-20 RX ORDER — DULOXETIN HYDROCHLORIDE 30 MG/1
30 CAPSULE, DELAYED RELEASE ORAL DAILY
Status: DISCONTINUED | OUTPATIENT
Start: 2019-05-21 | End: 2019-05-21 | Stop reason: HOSPADM

## 2019-05-20 RX ORDER — CYCLOBENZAPRINE HCL 10 MG
10 TABLET ORAL 3 TIMES DAILY PRN
Status: SHIPPED | COMMUNITY
End: 2022-02-14

## 2019-05-20 RX ORDER — OMEPRAZOLE 20 MG/1
20 CAPSULE, DELAYED RELEASE ORAL
Status: DISCONTINUED | OUTPATIENT
Start: 2019-05-21 | End: 2019-05-21 | Stop reason: HOSPADM

## 2019-05-20 RX ORDER — LORAZEPAM 2 MG/1
0.5 TABLET ORAL 2 TIMES DAILY
Status: SHIPPED | COMMUNITY
End: 2022-02-14

## 2019-05-20 RX ORDER — CYCLOBENZAPRINE HCL 10 MG
10 TABLET ORAL 3 TIMES DAILY PRN
Status: DISCONTINUED | OUTPATIENT
Start: 2019-05-20 | End: 2019-05-21 | Stop reason: HOSPADM

## 2019-05-20 RX ORDER — ACETAMINOPHEN 325 MG/1
650 TABLET ORAL EVERY 6 HOURS PRN
Status: DISCONTINUED | OUTPATIENT
Start: 2019-05-20 | End: 2019-05-21 | Stop reason: HOSPADM

## 2019-05-20 RX ORDER — LUBIPROSTONE 8 UG/1
8 CAPSULE ORAL 2 TIMES DAILY
Status: DISCONTINUED | OUTPATIENT
Start: 2019-05-20 | End: 2019-05-20

## 2019-05-20 RX ORDER — DRONABINOL 2.5 MG/1
2.5 CAPSULE ORAL
Status: DISCONTINUED | OUTPATIENT
Start: 2019-05-20 | End: 2019-05-21 | Stop reason: HOSPADM

## 2019-05-20 RX ORDER — MORPHINE SULFATE 4 MG/ML
2 INJECTION, SOLUTION INTRAMUSCULAR; INTRAVENOUS ONCE
Status: COMPLETED | OUTPATIENT
Start: 2019-05-20 | End: 2019-05-20

## 2019-05-20 RX ORDER — AMOXICILLIN 250 MG
2 CAPSULE ORAL 2 TIMES DAILY
Status: DISCONTINUED | OUTPATIENT
Start: 2019-05-20 | End: 2019-05-21 | Stop reason: HOSPADM

## 2019-05-20 RX ORDER — CLOPIDOGREL BISULFATE 75 MG/1
75 TABLET ORAL DAILY
Status: DISCONTINUED | OUTPATIENT
Start: 2019-05-21 | End: 2019-05-21 | Stop reason: HOSPADM

## 2019-05-20 RX ORDER — ESOMEPRAZOLE MAGNESIUM 40 MG/1
40 CAPSULE, DELAYED RELEASE ORAL
Status: DISCONTINUED | OUTPATIENT
Start: 2019-05-21 | End: 2019-05-20

## 2019-05-20 RX ORDER — BISACODYL 10 MG
10 SUPPOSITORY, RECTAL RECTAL
Status: DISCONTINUED | OUTPATIENT
Start: 2019-05-20 | End: 2019-05-21 | Stop reason: HOSPADM

## 2019-05-20 RX ORDER — POLYETHYLENE GLYCOL 3350 17 G/17G
1 POWDER, FOR SOLUTION ORAL
Status: DISCONTINUED | OUTPATIENT
Start: 2019-05-20 | End: 2019-05-21 | Stop reason: HOSPADM

## 2019-05-20 RX ORDER — NITROGLYCERIN 0.4 MG/1
0.4 TABLET SUBLINGUAL PRN
Status: DISCONTINUED | OUTPATIENT
Start: 2019-05-20 | End: 2019-05-21 | Stop reason: HOSPADM

## 2019-05-20 RX ORDER — POLYVINYL ALCOHOL 14 MG/ML
1-2 SOLUTION/ DROPS OPHTHALMIC
Status: DISCONTINUED | OUTPATIENT
Start: 2019-05-20 | End: 2019-05-21 | Stop reason: HOSPADM

## 2019-05-20 RX ORDER — LORAZEPAM 1 MG/1
2 TABLET ORAL EVERY 4 HOURS PRN
Status: DISCONTINUED | OUTPATIENT
Start: 2019-05-20 | End: 2019-05-21 | Stop reason: HOSPADM

## 2019-05-20 RX ORDER — CYCLOSPORINE 0.5 MG/ML
1 EMULSION OPHTHALMIC 2 TIMES DAILY
Status: DISCONTINUED | OUTPATIENT
Start: 2019-05-20 | End: 2019-05-20

## 2019-05-20 RX ORDER — ENALAPRILAT 1.25 MG/ML
1.25 INJECTION INTRAVENOUS EVERY 6 HOURS PRN
Status: DISCONTINUED | OUTPATIENT
Start: 2019-05-20 | End: 2019-05-21 | Stop reason: HOSPADM

## 2019-05-20 RX ORDER — ONDANSETRON 2 MG/ML
4 INJECTION INTRAMUSCULAR; INTRAVENOUS ONCE
Status: COMPLETED | OUTPATIENT
Start: 2019-05-20 | End: 2019-05-20

## 2019-05-20 RX ADMIN — LORAZEPAM 2 MG: 1 TABLET ORAL at 23:16

## 2019-05-20 RX ADMIN — MORPHINE SULFATE 2 MG: 4 INJECTION INTRAVENOUS at 20:31

## 2019-05-20 RX ADMIN — ONDANSETRON 4 MG: 2 INJECTION INTRAMUSCULAR; INTRAVENOUS at 20:31

## 2019-05-20 ASSESSMENT — PATIENT HEALTH QUESTIONNAIRE - PHQ9
1. LITTLE INTEREST OR PLEASURE IN DOING THINGS: NOT AT ALL
2. FEELING DOWN, DEPRESSED, IRRITABLE, OR HOPELESS: NOT AT ALL
SUM OF ALL RESPONSES TO PHQ9 QUESTIONS 1 AND 2: 0

## 2019-05-20 ASSESSMENT — LIFESTYLE VARIABLES
DO YOU DRINK ALCOHOL: NO
EVER_SMOKED: NEVER

## 2019-05-20 NOTE — ED NOTES
Patient had taken three nitros pta.  Patient back with [facility] for ekg

## 2019-05-20 NOTE — ED TRIAGE NOTES
Pt to triage in .  Chief Complaint   Patient presents with   • Chest Pain     left side, no relief w/ nitrox3 today   • Arm Pain     left     EKG complete.

## 2019-05-21 ENCOUNTER — PATIENT OUTREACH (OUTPATIENT)
Dept: HEALTH INFORMATION MANAGEMENT | Facility: OTHER | Age: 84
End: 2019-05-21

## 2019-05-21 ENCOUNTER — APPOINTMENT (OUTPATIENT)
Dept: RADIOLOGY | Facility: MEDICAL CENTER | Age: 84
End: 2019-05-21
Attending: INTERNAL MEDICINE
Payer: MEDICARE

## 2019-05-21 VITALS
WEIGHT: 136.47 LBS | SYSTOLIC BLOOD PRESSURE: 105 MMHG | DIASTOLIC BLOOD PRESSURE: 52 MMHG | RESPIRATION RATE: 13 BRPM | HEART RATE: 85 BPM | OXYGEN SATURATION: 95 % | TEMPERATURE: 97.2 F | BODY MASS INDEX: 26.79 KG/M2 | HEIGHT: 60 IN

## 2019-05-21 PROBLEM — R63.8 DECREASED ORAL INTAKE: Status: ACTIVE | Noted: 2019-05-21

## 2019-05-21 PROBLEM — R21 RASH: Status: ACTIVE | Noted: 2019-05-21

## 2019-05-21 LAB
EKG IMPRESSION: NORMAL
TROPONIN I SERPL-MCNC: <0.01 NG/ML (ref 0–0.04)

## 2019-05-21 PROCEDURE — 90471 IMMUNIZATION ADMIN: CPT

## 2019-05-21 PROCEDURE — A9502 TC99M TETROFOSMIN: HCPCS

## 2019-05-21 PROCEDURE — 84484 ASSAY OF TROPONIN QUANT: CPT

## 2019-05-21 PROCEDURE — A9270 NON-COVERED ITEM OR SERVICE: HCPCS | Performed by: INTERNAL MEDICINE

## 2019-05-21 PROCEDURE — 700111 HCHG RX REV CODE 636 W/ 250 OVERRIDE (IP)

## 2019-05-21 PROCEDURE — 93010 ELECTROCARDIOGRAM REPORT: CPT | Performed by: INTERNAL MEDICINE

## 2019-05-21 PROCEDURE — G0378 HOSPITAL OBSERVATION PER HR: HCPCS

## 2019-05-21 PROCEDURE — 99217 PR OBSERVATION CARE DISCHARGE: CPT | Performed by: HOSPITALIST

## 2019-05-21 PROCEDURE — 700102 HCHG RX REV CODE 250 W/ 637 OVERRIDE(OP): Performed by: INTERNAL MEDICINE

## 2019-05-21 PROCEDURE — 90670 PCV13 VACCINE IM: CPT

## 2019-05-21 PROCEDURE — 93005 ELECTROCARDIOGRAM TRACING: CPT | Performed by: INTERNAL MEDICINE

## 2019-05-21 RX ORDER — DIPHENHYDRAMINE HYDROCHLORIDE, ZINC ACETATE 2; .1 G/100G; G/100G
1 CREAM TOPICAL 2 TIMES DAILY PRN
Status: DISCONTINUED | OUTPATIENT
Start: 2019-05-21 | End: 2019-05-21 | Stop reason: HOSPADM

## 2019-05-21 RX ORDER — DIPHENHYDRAMINE HYDROCHLORIDE, ZINC ACETATE 2; .1 G/100G; G/100G
1 CREAM TOPICAL 2 TIMES DAILY PRN
Qty: 1 TUBE | Refills: 0 | Status: SHIPPED | OUTPATIENT
Start: 2019-05-21 | End: 2020-12-23

## 2019-05-21 RX ORDER — DRONABINOL 2.5 MG/1
2.5 CAPSULE ORAL 2 TIMES DAILY
Qty: 60 CAP | Refills: 0 | Status: SHIPPED | OUTPATIENT
Start: 2019-05-21 | End: 2019-06-20

## 2019-05-21 RX ORDER — REGADENOSON 0.08 MG/ML
INJECTION, SOLUTION INTRAVENOUS
Status: COMPLETED
Start: 2019-05-21 | End: 2019-05-21

## 2019-05-21 RX ADMIN — REGADENOSON 0.4 MG: 0.08 INJECTION, SOLUTION INTRAVENOUS at 08:40

## 2019-05-21 RX ADMIN — DULOXETINE HYDROCHLORIDE 30 MG: 30 CAPSULE, DELAYED RELEASE ORAL at 06:17

## 2019-05-21 RX ADMIN — CLOPIDOGREL BISULFATE 75 MG: 75 TABLET ORAL at 06:16

## 2019-05-21 RX ADMIN — ASPIRIN 81 MG: 81 TABLET, DELAYED RELEASE ORAL at 06:16

## 2019-05-21 RX ADMIN — OMEPRAZOLE 20 MG: 20 CAPSULE, DELAYED RELEASE ORAL at 06:16

## 2019-05-21 RX ADMIN — PNEUMOCOCCAL 13-VALENT CONJUGATE VACCINE 0.5 ML: 2.2; 2.2; 2.2; 2.2; 2.2; 4.4; 2.2; 2.2; 2.2; 2.2; 2.2; 2.2; 2.2 INJECTION, SUSPENSION INTRAMUSCULAR at 12:41

## 2019-05-21 RX ADMIN — METOPROLOL TARTRATE 25 MG: 25 TABLET ORAL at 06:23

## 2019-05-21 ASSESSMENT — ENCOUNTER SYMPTOMS
TINGLING: 0
DIARRHEA: 0
WEAKNESS: 0
HEADACHES: 0
SPUTUM PRODUCTION: 0
MYALGIAS: 0
ABDOMINAL PAIN: 0
FEVER: 0
NAUSEA: 0
DIZZINESS: 0
PALPITATIONS: 0
CONSTIPATION: 0
FALLS: 0
STRIDOR: 0
DEPRESSION: 0
SHORTNESS OF BREATH: 0
LOSS OF CONSCIOUSNESS: 0
COUGH: 0
VOMITING: 0
CHILLS: 0

## 2019-05-21 NOTE — DISCHARGE SUMMARY
Discharge Summary    CHIEF COMPLAINT ON ADMISSION  Chief Complaint   Patient presents with   • Chest Pain     left side, no relief w/ nitrox3 today   • Arm Pain     left       Reason for Admission  Chest pain     Admission Date  5/20/2019    CODE STATUS  Full Code    HPI & HOSPITAL COURSE  This is a 84 y.o. female here with chest pain.  Patient has known medical history of anemia, arthritis, asthma, previous C. difficile infection in 2009, tremors, colitis, fibromyalgia, gastritis, heartburn, hiatal hernia, indigestion, MI, osteoporosis, pulmonary emboli previously, stroke previously 2009 and urinary bladder disorder.  Patient presented to the emergency room after developing chest pain located in the left side of her chest radiating to her left arm.  Patient states she took 3 nitros at home with no improvement.  Patient states pain began in her left arm and radiated up into her chest.  Per daughter and patient patient is recently been under significant amount of stress as her daughter was diagnosed with brain cancer.  Patient has had decreased oral intake at home.  Patient was admitted to CDU for further work-up and monitoring.  Patient was placed on telemetry monitoring with no acute cardiac events noted.  EKG shows sinus rhythm with first-degree block.  Chest x-ray shows mild enlargement of cardiac silhouette, no other acute findings noted.  CBC and BMP were essentially benign and noncontributory.  Troponin remained negative.  Patient underwent nuclear medicine cardiac stress test which showed no evidence of significant jeopardized viable myocardium or prior myocardial infarction.  On exam today patient states chest pain has resolved and she is returned to baseline.  Chest pain likely related to strain stress and anxiety related to social stressors at home.  Patient was initiated on Marinol as she has had severely poor appetite at home and is requesting medication to help her eat.  Patient will continue with this  medication as outpatient and follow-up with her PCP.  Patient's vital signs remained stable.  Patient's lungs are clear on auscultation.  Patient denied chest pain on palpation of chest wall.  Patient maintain oxygen saturations on room air.  Patient is returned to her baseline.  Discussed with patient and daughter and patient will continue with her home medications and follow-up with her PCP as needed.  Patient was noted to have small rash on her abdomen which was treated with Benadryl cream and she will follow-up with PCP for further evaluation.       Therefore, she is discharged in good and stable condition to home with close outpatient follow-up.        Discharge Date  5/21/2019    FOLLOW UP ITEMS POST DISCHARGE  Please follow up with PCP   Keep log of blood pressure readings    DISCHARGE DIAGNOSES  Active Problems:    Chest pain POA: Yes    Fibromyalgia POA: Yes      Overview: Treated with Norco.          GERD (gastroesophageal reflux disease) POA: Yes    Rash POA: Yes    Decreased oral intake POA: Yes  Resolved Problems:    * No resolved hospital problems. *      FOLLOW UP    Thom Rodriguez M.D.  [address] [place] [id number]  [phone]    Schedule an appointment as soon as possible for a visit  Follow-up after hospital stay      MEDICATIONS ON DISCHARGE     Medication List      START taking these medications      Instructions   diphenhydrAMINE-zinc acetate cream   Apply 1 Each to affected area(s) 2 times a day as needed (itch).  Dose:  1 Each     dronabinol 2.5 MG Caps  Commonly known as:  MARINOL   Take 1 Cap by mouth 2 times a day for 30 days.  Dose:  2.5 mg        CHANGE how you take these medications      Instructions   metoprolol 25 MG Tabs  What changed:  how much to take  Commonly known as:  LOPRESSOR   Take 0.5 Tabs by mouth 2 times a day.  Dose:  12.5 mg        CONTINUE taking these medications      Instructions   albuterol 108 (90 Base) MCG/ACT Aers inhalation aerosol   Inhale 2  Puffs by mouth every 6 hours as needed for Shortness of Breath.  Dose:  2 Puff     AMITIZA 8 MCG Caps  Generic drug:  Lubiprostone   Take 8 mcg by mouth 2 Times a Day.  Dose:  8 mcg     aspirin EC 81 MG Tbec  Commonly known as:  ECOTRIN   Take 81 mg by mouth every day.  Dose:  81 mg     celecoxib 200 MG Caps  Commonly known as:  CELEBREX   Take 200 mg by mouth every day.  Dose:  200 mg     cyclobenzaprine 10 MG Tabs  Commonly known as:  FLEXERIL   Take 10 mg by mouth 3 times a day as needed for Muscle Spasms.  Dose:  10 mg     CYMBALTA 30 MG Cpep  Generic drug:  DULoxetine   Take 30 mg by mouth every day.  Dose:  30 mg     LORazepam 2 MG tablet  Commonly known as:  ATIVAN   Take 2 mg by mouth 2 Times a Day.  Dose:  2 mg     NEXIUM 40 MG delayed-release capsule  Generic drug:  esomeprazole   Take 40 mg by mouth every morning before breakfast.  Dose:  40 mg     nitroglycerin 0.4 MG Subl  Commonly known as:  NITROSTAT   Place 1 Tab under tongue as needed for Chest Pain.  Dose:  0.4 mg     PLAVIX 75 MG Tabs  Generic drug:  clopidogrel   Take 75 mg by mouth every day.  Dose:  75 mg     RESTASIS 0.05 % ophthalmic emulsion  Generic drug:  cyclosporin   Place 1 Drop in both eyes 2 times a day.  Dose:  1 Drop            Allergies  Allergies   Allergen Reactions   • Contrast Media With Iodine [Iodine] Itching     Dyspnea, throat closing   • Tequin [Gatifloxacin Sesquihydrate] Hives     Severe rash and SOB       DIET  Orders Placed This Encounter   Procedures   • Diet Order Regular     Standing Status:   Standing     Number of Occurrences:   1     Order Specific Question:   Diet:     Answer:   Regular [1]     Order Specific Question:   Miscellaneous modifications:     Answer:   No Decaf, No Caffeine(for test) [11]     Comments:   Protocol 1313 Patient to have no caffeine for 12 hours prior to exam (decaf, coffee, cola, tea, chocolate)       ACTIVITY  As tolerated.  Weight bearing as tolerated    CONSULTATIONS  None      PROCEDURES  NM Cardiac Stress Test     LABORATORY  Lab Results   Component Value Date    SODIUM 139 05/20/2019    POTASSIUM 4.7 05/20/2019    CHLORIDE 107 05/20/2019    CO2 27 05/20/2019    GLUCOSE 97 05/20/2019    BUN 19 05/20/2019    CREATININE 0.77 05/20/2019    CREATININE 0.9 04/21/2009        Lab Results   Component Value Date    WBC 5.9 05/20/2019    HEMOGLOBIN 13.5 05/20/2019    HEMATOCRIT 42.7 05/20/2019    PLATELETCT 245 05/20/2019

## 2019-05-21 NOTE — RESPIRATORY CARE
COPD EDUCATION by COPD CLINICAL EDUCATOR  5/21/2019 at 8:16 AM by Ana Alegria     Patient reviewed by COPD education team. Patient does not qualify for the COPD program.

## 2019-05-21 NOTE — PROGRESS NOTES
2310 Pt into unit from ED via gurney transported by this CDU RN accompanied by pt's daughter and granddaughter, SRon tele monitor, HR: 60s. Pt AOx4,  ambulatory w/ unsteady gait. Reports minimal chest pain, declines any interventions at this time. Patient oriented to unit, room and BR location. Weight and VS taken. Attached to cardiac monitoring. Admit profile and initial assessment done. Plan of care discussed w/ patient and family, verbalizes understanding. Medicated per MAR. Fall precautions and safety measures in place. BSC provided. Patient and family questions answered. Encouraged to verbalize feelings and needs. Call light within reach. Will continue to assess and monitor.

## 2019-05-21 NOTE — H&P
Hospital Medicine History & Physical Note    Date of Service  5/20/2019    Primary Care Physician  Thom Rodriguez M.D.    Consultants  None    Code Status  Full    Chief Complaint  Chest pain    History of Presenting Illness  84 y.o. female who presented 5/20/2019 with chest pain.  Patient developed chest pain this morning, located on the left with radiation to the left arm.  At home she took 3 nitro with no improvement.  Patient actually states it started as left arm pain and then went to her left chest.  Patient has been under significant stress recently, her daughter was diagnosed with brain cancer.  She has had a decreased oral intake.  She did have shortness of breath with the pain however no nausea, vomiting or diaphoresis.  She did have some mild skin changes on the right abdomen.  Did discuss the case including labs and imaging with the ER physician.  I also discussed the case with family at bedside.    Review of Systems  Review of Systems   Constitutional: Negative for chills, fever and malaise/fatigue.   HENT: Negative for congestion.    Respiratory: Negative for cough, sputum production, shortness of breath and stridor.    Cardiovascular: Positive for chest pain. Negative for palpitations and leg swelling.   Gastrointestinal: Negative for abdominal pain, constipation, diarrhea, nausea and vomiting.   Genitourinary: Negative for dysuria and urgency.   Musculoskeletal: Positive for joint pain (left arm). Negative for falls and myalgias.   Skin: Positive for rash.   Neurological: Negative for dizziness, tingling, loss of consciousness, weakness and headaches.   Psychiatric/Behavioral: Negative for depression and suicidal ideas.   All other systems reviewed and are negative.      Past Medical History   has a past medical history of Anemia; Anesthesia; Arthritis; Asthma; Backpain; Breath shortness; CATARACT; Clostridium difficile infection (2009); Coarse tremors; Colitis; Dental disorder; Fibromyalgia;  Gastritis; Heart burn; Hiatus hernia syndrome; Indigestion; Myocardial infarct (HCC) (5/2015); Osteoporosis; Other specified disorder of intestines; Pain; Pneumonia; Psychiatric problem; Pulmonary embolism (HCC); Stroke ([facility]) (2009); and Urinary bladder disorder.    Surgical History   has a past surgical history that includes erendira by laparoscopy (4/19/2009); rotator cuff repair (2000); hysterectomy, total abdominal (1973); knee arthroscopy (10/8/2009); medial meniscectomy (10/8/2009); meniscectomy (10/8/2009); and recovery (6/11/2015).     Family History  family history includes Anesthesia in her father; Cancer in her unknown relative; Diabetes in her unknown relative; Heart Disease in her unknown relative; Hypertension in her unknown relative; Stroke in her unknown relative.     Social History   reports that she has never smoked. She has never used smokeless tobacco. She reports that she does not drink alcohol or use drugs.    Allergies  Allergies   Allergen Reactions   • Contrast Media With Iodine [Iodine] Itching     Dyspnea, throat closing   • Tequin [Gatifloxacin Sesquihydrate] Hives     Severe rash and SOB       Medications  Prior to Admission Medications   Prescriptions Last Dose Informant Patient Reported? Taking?   AMITIZA 8 MCG PO CAPS 5/20/2019 at 1000 Family Member Yes No   Sig: Take 8 mcg by mouth 2 Times a Day.   LORazepam (ATIVAN) 2 MG tablet 5/20/2019 at 1000 Family Member Yes Yes   Sig: Take 2 mg by mouth 2 Times a Day.   albuterol 108 (90 Base) MCG/ACT Aero Soln inhalation aerosol 5/20/2019 at 1000 Family Member Yes No   Sig: Inhale 2 Puffs by mouth every 6 hours as needed for Shortness of Breath.   aspirin EC (ECOTRIN) 81 MG Tablet Delayed Response 5/20/2019 at 1000 Family Member Yes Yes   Sig: Take 81 mg by mouth every day.   celecoxib (CELEBREX) 200 MG Cap 5/20/2019 at 1000 Family Member Yes No   Sig: Take 200 mg by mouth every day.   clopidogrel (PLAVIX) 75 MG TABS 5/20/2019 at 1000 Family  Member Yes No   Sig: Take 75 mg by mouth every day.   cyclobenzaprine (FLEXERIL) 10 MG Tab 5/19/2019 at 2030 Family Member Yes Yes   Sig: Take 10 mg by mouth 3 times a day as needed for Muscle Spasms.   cyclosporin (RESTASIS) 0.05 % ophthalmic emulsion 5/20/2019 at 1000 Family Member Yes No   Sig: Place 1 Drop in both eyes 2 times a day.   duloxetine (CYMBALTA) 30 MG CPEP 5/20/2019 at 1000 Family Member Yes No   Sig: Take 30 mg by mouth every day.   esomeprazole (NEXIUM) 40 MG delayed-release capsule 5/20/2019 at 1000 Family Member Yes No   Sig: Take 40 mg by mouth every morning before breakfast.   metoprolol (LOPRESSOR) 25 MG Tab 5/20/2019 at 1000 Family Member Yes Yes   Sig: Take 25 mg by mouth 2 times a day.   nitroglycerin (NITROSTAT) 0.4 MG SUBL 5/20/2019 at 1000 Family Member No No   Sig: Place 1 Tab under tongue as needed for Chest Pain.      Facility-Administered Medications: None       Physical Exam  Temp:  [36.1 °C (96.9 °F)-36.9 °C (98.5 °F)] 36.9 °C (98.5 °F)  Pulse:  [58-73] 66  Resp:  [18-19] 19  BP: (131-155)/(59-70) 134/59  SpO2:  [92 %-97 %] 97 %    Physical Exam   Constitutional: She is oriented to person, place, and time. She appears well-developed. She is cooperative. No distress.   HENT:   Head: Normocephalic and atraumatic. Not macrocephalic and not microcephalic. Head is without raccoon's eyes and without Turner's sign.   Right Ear: External ear normal.   Left Ear: External ear normal.   Mouth/Throat: Oropharynx is clear and moist. No oropharyngeal exudate.   Eyes: Conjunctivae are normal. Right eye exhibits no discharge. Left eye exhibits no discharge. No scleral icterus.   Neck: Neck supple. No tracheal deviation present.   Cardiovascular: Normal rate, regular rhythm and intact distal pulses.  Exam reveals no gallop, no distant heart sounds and no friction rub.    Murmur heard.  Pulmonary/Chest: Effort normal. No accessory muscle usage or stridor. No tachypnea and no bradypnea. No  respiratory distress. She has no decreased breath sounds. She has no wheezes. She has no rhonchi. She has no rales. She exhibits no tenderness.   Abdominal: Soft. Bowel sounds are normal. She exhibits no distension. There is no hepatosplenomegaly, splenomegaly or hepatomegaly. There is no tenderness. There is no rebound and no guarding.   Musculoskeletal: Normal range of motion. She exhibits no edema or tenderness.   Lymphadenopathy:     She has no cervical adenopathy.   Neurological: She is alert and oriented to person, place, and time. No cranial nerve deficit. She displays no seizure activity.   Skin: Skin is warm, dry and intact. Rash (right abdominal wall) noted. She is not diaphoretic. No erythema. No pallor.   Psychiatric: She has a normal mood and affect. Her speech is normal and behavior is normal. Judgment and thought content normal. Cognition and memory are normal.   Nursing note and vitals reviewed.      Laboratory:  Recent Labs      05/20/19   1200   WBC  5.9   RBC  4.68   HEMOGLOBIN  13.5   HEMATOCRIT  42.7   MCV  91.2   MCH  28.8   MCHC  31.6*   RDW  43.9   PLATELETCT  245   MPV  10.9     Recent Labs      05/20/19   1200   SODIUM  139   POTASSIUM  4.7   CHLORIDE  107   CO2  27   GLUCOSE  97   BUN  19   CREATININE  0.77   CALCIUM  9.4     Recent Labs      05/20/19   1200   ALTSGPT  14   ASTSGOT  21   ALKPHOSPHAT  59   TBILIRUBIN  0.3   GLUCOSE  97                 Recent Labs      05/20/19   1200  05/20/19 2020   TROPONINI  <0.01  <0.01       Urinalysis:    No results found     Imaging:  DX-CHEST-PORTABLE (1 VIEW)   Final Result      Mild enlargement of the cardiac silhouette. No evidence of pneumonia or pulmonary edema.      NM-CARDIAC STRESS TEST    (Results Pending)         Assessment/Plan:  I anticipate this patient is appropriate for observation status at this time.    Chest pain- (present on admission)   Assessment & Plan    -Concerning for ACS  -Patient does require close monitoring  -Monitor  overnight with telemetry, repeat troponins as well as EKG  -If no change, obtain stress test  -Symptomatic care  -I personally reviewed the chest x-ray, I did not note anything acute  -I also reviewed the EKG, it was sinus rhythm with no ST changes     Decreased oral intake- (present on admission)   Assessment & Plan    -Daughter was very concerned about this, they did try to give her pot Gummies however the patient refused  -She was curious if there was a pill that she could try and so I will start Marinol     Rash- (present on admission)   Assessment & Plan    -Mild, located on the right mid abdomen, start Benadryl cream for itch     GERD (gastroesophageal reflux disease)- (present on admission)   Assessment & Plan    -Continue omeprazole     Fibromyalgia- (present on admission)   Assessment & Plan    -Continue home meds         VTE prophylaxis: SCDs

## 2019-05-21 NOTE — DISCHARGE INSTRUCTIONS
Discharge Instructions    Discharged to home by car with relative. Discharged via wheelchair, hospital escort: Yes.  Special equipment needed: Not Applicable    Be sure to schedule a follow-up appointment with your primary care doctor or any specialists as instructed.     Discharge Plan:   Diet Plan: Discussed  Activity Level: Discussed  Confirmed Follow up Appointment: Patient to Call and Schedule Appointment  Confirmed Symptoms Management: Discussed  Medication Reconciliation Updated: Yes  Pneumococcal Vaccine Administered/Refused: Given (See MAR)  Influenza Vaccine Indication: Not indicated: Previously immunized this influenza season and > 8 years of age, Indicated: 65 years and older    I understand that a diet low in cholesterol, fat, and sodium is recommended for good health. Unless I have been given specific instructions below for another diet, I accept this instruction as my diet prescription.   Other diet: Heart healthy    Special Instructions:   Follow up with PCP   Take prescriptions as instructed   Keep log of blood pressure readings to show primary doctor    · Is patient discharged on Warfarin / Coumadin?   No     Depression / Suicide Risk    As you are discharged from this [facility] facility, it is important to learn how to keep safe from harming yourself.    Recognize the warning signs:  · Abrupt changes in personality, positive or negative- including increase in energy   · Giving away possessions  · Change in eating patterns- significant weight changes-  positive or negative  · Change in sleeping patterns- unable to sleep or sleeping all the time   · Unwillingness or inability to communicate  · Depression  · Unusual sadness, discouragement and loneliness  · Talk of wanting to die  · Neglect of personal appearance   · Rebelliousness- reckless behavior  · Withdrawal from people/activities they love  · Confusion- inability to concentrate     If you or a loved one observes any of these behaviors or  has concerns about self-harm, here's what you can do:  · Talk about it- your feelings and reasons for harming yourself  · Remove any means that you might use to hurt yourself (examples: pills, rope, extension cords, firearm)  · Get professional help from the community (Mental Health, Substance Abuse, psychological counseling)  · Do not be alone:Call your Safe Contact- someone whom you trust who will be there for you.  · Call your local CRISIS HOTLINE 705-2540 or 857-049-3501  · Call your local Children's Mobile Crisis Response Team [place] (265) 010-1387 or www.SafeStore  · Call the toll free National Suicide Prevention Hotlines   · National Suicide Prevention Lifeline 057-635-QZPR (2090)  · National Hope Line Network 800-SUICIDE (196-0181)

## 2019-05-21 NOTE — ASSESSMENT & PLAN NOTE
-Daughter was very concerned about this, they did try to give her pot Gummies however the patient refused  -She was curious if there was a pill that she could try and so I will start Marinol

## 2019-05-21 NOTE — CARE PLAN
Problem: Unstable Angina/Chest Pain/Non Stemi  Goal: Optimal Care of the Angina/Chest Pain/Non Stemi Patient  Outcome: PROGRESSING AS EXPECTED  Interventions: Vital Signs and Cardiac Monitoring. Monitor for Arrhythmias. Trend Troponin, EKG per orders    Problem:Communication  Goal: The ability to communicate needs accurately and effectively will improve  Outcome: PROGRESSING AS EXPECTED  Interventions: [place] patient and SO to alert staff of needs, Reorient patient to environment as needed, Educate patient and SO about plan of care, procedures, treatments, medications and allow for questions, Use communication aids/ as appropriate.

## 2019-05-21 NOTE — ED NOTES
Med rec updated and complete.  Allergies reviewed . Met with pt/family at bedside and dicussed current   Medications and last doses taken.  No antibiotic use in last 30 days at home.

## 2019-05-21 NOTE — PROGRESS NOTES
Patient sleeping calmly in bed, SR on cardiac monitor, HR: 70s-80s, mild unlabored breathing, no signs of distress noted.      AM labs drawn for third troponin and sent to lab.

## 2019-05-21 NOTE — PROGRESS NOTES
Pt DC'd. IV removed, discharge instructions provided to patient, pt verbalizes understanding. Pt states all questions have been answered. Copy of discharge paperwork provided to pt, signed copy in chart. Pt states all belongings in possession. Pt escorted off unit by tech without incident.

## 2019-05-21 NOTE — ED PROVIDER NOTES
ED Provider Note    HPI: Patient is an 84-year-old female who presented to the emergency department May 20, 2019 at 10:58 AM with a chief complaint of chest pain and arm pain.    Patient has had left sided chest pain and left upper extremity pain intermittently over the course the day.  She presented at about 11 AM and EKG imaging was felt to be nondiagnostic.  She was brought back to the department about 8 PM.  Patient took a total of 3 sublingual nitroglycerin with minimal improvement.  She also notes a new rash on the right side of her abdomen and some tenderness.  She has had no fever no chills no cough no shortness of breath no change in bladder or bowel habits.  Pain is not positional in nature or related to activity or movement no other somatic complaint    Review of Systems: Positive left-sided chest and arm pain positive for rash on right side of abdomen positive for tinnitus negative for fever chills cough shortness of breath change in bladder bowel habits.  Review of systems reviewed with patient and family, all other systems negative    Past medical/surgical history: Colitis gastritis fibromyalgia heartburn shortness of breath hiatal hernia osteoporosis pneumonia anemia pulmonary embolism asthma depression CVA myocardial infarction arthritis cholecystectomy hysterectomy hypertension    Medications: Albuterol Nexium Norco Bactrim Ativan Celebrex Lipitor Ativan Celebrex Lipitor Cymbalta Plavix Advil Colace Lopressor    Allergies: Iodine contrast Tequin    Social History: No history of smoking no alcohol use      Physical exam: Constitutional: Elderly female awake alert  Vital signs: Temperature 97.5 blood pressure 130/69 pulse 67 respirations 18 pulse oximetry 98%  EYES: PERRL, EOMI, Conjunctivae and sclera normal, eyelids normal bilaterally.  Neck: Trachea midline. No cervical masses seen or palpated. Normal range of motion, supple. No meningeal signs elicited.  Cardiac: Regular rate and rhythm. S1-S2  present. No S3 or S4 present. No murmurs, rubs, or gallops heard. No edema or varicosities were seen.   Lungs: Clear to auscultation with good aeration throughout. No wheezes, rales, or rhonchi heard. Patient's chest wall moved symmetrically with each respiratory effort. Patient was not making use of accessory muscles of respiration in breathing.  Abdomen: Soft nontender to palpation. No rebound or guarding elicited. No organomegaly identified. No pulsatile abdominal masses identified.   Musculoskeletal:  no  pain with palpitation or movement of muscle, bone or joint , no obvious musculoskeletal deformities identified.  Neurologic: alert and awake answers questions appropriately. Moves all four extremities independently, no gross focal abnormalities identified. Normal strength and motor.  Skin: Silver dollar sized rash on the wall with some scaly changes noted.  This could be consistent with eczema.  There did not appear to be infectious in nature.  No other rash or lesion seen, no other palpable dermatologic lesions identified per  Psychiatric: not anxious, delusional, or hallucinating.    Medical decision making:   EKG obtained on arrival (interpretation) twelve-lead EKG sinus rhythm rate 82.  Morphology of P waves QRS complexes unremarkable.  Left axis deviation is present.  Patient has nonspecific ST changes diffusely but no evidence of ST elevation or depression.  Interpreted as an abnormal EKG but not indicating acute ischemia or dysrhythmia    Patient noted to be somewhat hypertensive on arrival.  She has a previous history of hypertension we will follow with her primary care provider for recheck.    Laboratory studies obtained (please see lab sheet for all results) significant findings included unremarkable CBC chemistry panel normal troponin    Chest x-ray obtained; mild cardiomegaly was seen no evidence of pneumonia or other abnormality    Repeat troponin test obtained, normal.    Patient be admitted for  further evaluation by hospitalist service.  While it is encouraging her studies are negative she does have a known cardiac disease and I would characterize her pain is having at least a moderate likelihood of cardiac etiology.  Patient signs and symptoms would not seem to go along with either aortic disease or pulmonary embolism.    Further care and hospital course per attending physician summary    Impression 1) chest pain moderate likelihood of cardiac etiology  2) eczema, abdomen  3) tinnitus, chronic

## 2019-05-21 NOTE — ASSESSMENT & PLAN NOTE
-Concerning for ACS  -Patient does require close monitoring  -Monitor overnight with telemetry, repeat troponins as well as EKG  -If no change, obtain stress test  -Symptomatic care  -I personally reviewed the chest x-ray, I did not note anything acute  -I also reviewed the EKG, it was sinus rhythm with no ST changes

## 2019-05-21 NOTE — PROGRESS NOTES
Pt returned from [facility] med. Assessment completed. Pt A&O x 4. Portuguese speaking, translation provided by family members per pt request. Respirations are even and unlabored on RA. Neuro intact. POC updated. Pt educated on room and call light. Pt verbalizes understanding. Whiteboard updated. Pt denies pain at this time. Monitors applied, SR on tele, call light and belongings within reach. Needs met, will continue to monitor.

## 2019-05-29 NOTE — DOCUMENTATION QUERY
[facility]                                                                       Query Response Note      PATIENT:               CLEMENTINA PAGAN  ACCT #:                  [id number]  MRN:                     [id number]  :                      1935  ADMIT DATE:       2019 7:51 PM  DISCH DATE:        2019 1:08 PM  RESPONDING  PROVIDER #:        [id number]           QUERY TEXT:    Depression is documented in the Medical Record.  Please specify the type.    NOTE:  If an appropriate response is not listed below, please respond with a new note.              The patient's Clinical Indicators include:  Past has a history of Depression.  Patient taking Cymbalta 30 mg, ordered and given 19 during this admission.    Query created by: Shikha Koch on 2019 2:51 AM    RESPONSE TEXT:    Unable to determine          Electronically signed by:  JAGDISH FARIAS DO 2019 7:08 PM

## 2020-05-05 ENCOUNTER — OFFICE VISIT (OUTPATIENT)
Dept: PULMONOLOGY | Facility: HOSPICE | Age: 85
End: 2020-05-05
Payer: MEDICARE

## 2020-05-05 VITALS
HEART RATE: 98 BPM | BODY MASS INDEX: 25.03 KG/M2 | HEIGHT: 62 IN | OXYGEN SATURATION: 95 % | WEIGHT: 136 LBS | DIASTOLIC BLOOD PRESSURE: 70 MMHG | SYSTOLIC BLOOD PRESSURE: 126 MMHG

## 2020-05-05 DIAGNOSIS — R09.02 HYPOXIA: ICD-10-CM

## 2020-05-05 DIAGNOSIS — I26.99 PULMONARY EMBOLISM WITHOUT ACUTE COR PULMONALE, UNSPECIFIED CHRONICITY, UNSPECIFIED PULMONARY EMBOLISM TYPE (HCC): ICD-10-CM

## 2020-05-05 DIAGNOSIS — J44.89 COPD WITH ASTHMA (HCC): ICD-10-CM

## 2020-05-05 PROCEDURE — 99204 OFFICE O/P NEW MOD 45 MIN: CPT | Performed by: INTERNAL MEDICINE

## 2020-05-05 ASSESSMENT — FIBROSIS 4 INDEX: FIB4 SCORE: 1.9

## 2020-05-05 NOTE — PROGRESS NOTES
Navya Mcbride is a 84 y.o. female here for COPD on maintenance inhalers and nighttime oxygen. Patient was referred by her primary care.    History of Present Illness:      This lady comes in with her daughter Riddhi, she speaks Hebrew, her daughter is a registered nurse, and interprets very effectively.    This lady is part-time in [place] part-time in [place], but with her daughter more often recently and needs local pulmonary follow-up.  [facility], she is on Symbicort pro-air and nighttime oxygen.  She remains active and vigorous, at age 84.    Intermittent sputum production, no smoking history, and x-ray done 1 year ago was clear.  When she returns for follow-up visit in the fall, if we have x-ray facilities available we will have a simple x-ray done at that time, I do not see a need to pursue one now as her breathing pattern is good her exercise capacity is good and there is no deterioration in her status recently.    She and her daughter are aware of COVID, her daughter works in [employer], [employer], and has very good insight.  She understands that if Navya develops fever cough or shortness of breath in combination she would need to be checked in a healthcare facility for possible virus infection.    This lady seems to be doing very well, her maintenance inhaler of Symbicort and rescue inhaler are noted.  Flu vaccine was not administered this past year, I stressed the urgency on an annual basis of getting the flu vaccine, Pneumovax and Prevnar are current, we will see her in the fall and plan for that.    The last issue is anxiety, her daughter indicates that sometimes her breathing pattern is altered and I did explain that if she is anxious, using pursed lip breathing techniques, her daughter explained that to her.  We will reassess in 6 months sooner for problems    Constitutional ROS: No unexpected change in weight, No  unexplained fevers  Eyes: No change in vision or blurring or double vision  Mouth/Throat ROS: No sore throat, No recent change in voice or hoarseness  Pulmonary ROS: See present history for pertinent positives  Cardiovascular ROS: No chest pain to suggest acute coronary syndrome  Gastrointestinal ROS: No abdominal pain to suggest peptic disease  Musculoskeletal/Extremities ROS: no acute artritis or unusual swelling  Hematologic/Lymphatic ROS: No easy bleeding or unusual lymph node swelling  Neurologic ROS: No new or unusual weakness  Psychiatric ROS: No hallucinations  Allergic/Immunologic: No  urticaria or allergic rash      Current Outpatient Medications   Medication Sig Dispense Refill   • metoprolol (LOPRESSOR) 25 MG Tab Take 0.5 Tabs by mouth 2 times a day. 30 Tab 0   • aspirin EC (ECOTRIN) 81 MG Tablet Delayed Response Take 81 mg by mouth every day.     • LORazepam (ATIVAN) 2 MG tablet Take 2 mg by mouth 2 Times a Day.     • cyclobenzaprine (FLEXERIL) 10 MG Tab Take 10 mg by mouth 3 times a day as needed for Muscle Spasms.     • albuterol 108 (90 Base) MCG/ACT Aero Soln inhalation aerosol Inhale 2 Puffs by mouth every 6 hours as needed for Shortness of Breath.     • esomeprazole (NEXIUM) 40 MG delayed-release capsule Take 40 mg by mouth every morning before breakfast.     • celecoxib (CELEBREX) 200 MG Cap Take 200 mg by mouth every day.     • duloxetine (CYMBALTA) 30 MG CPEP Take 30 mg by mouth every day.     • AMITIZA 8 MCG PO CAPS Take 8 mcg by mouth 2 Times a Day.     • diphenhydrAMINE-zinc acetate cream Apply 1 Each to affected area(s) 2 times a day as needed (itch). 1 Tube 0   • clopidogrel (PLAVIX) 75 MG TABS Take 75 mg by mouth every day.     • cyclosporin (RESTASIS) 0.05 % ophthalmic emulsion Place 1 Drop in both eyes 2 times a day.     • nitroglycerin (NITROSTAT) 0.4 MG SUBL Place 1 Tab under tongue as needed for Chest Pain. 25 Tab 1     No current facility-administered medications for this visit.   "      Social History     Tobacco Use   • Smoking status: Never Smoker   • Smokeless tobacco: Never Used   Substance Use Topics   • Alcohol use: No   • Drug use: No        Past Medical History:   Diagnosis Date   • Anemia     chronic   • Anesthesia     \"hard to wake up\"   • Arthritis     hands, states RA and Osteo   • Asthma     uses inhaler   • Backpain     compressed discs   • Breath shortness     oxygen 2L N/C prn   • CATARACT    • Clostridium difficile infection 2009   • Coarse tremors    • Colitis    • Dental disorder     chronic gum infection   • Fibromyalgia    • Gastritis    • Heart burn    • Hiatus hernia syndrome    • Indigestion    • Myocardial infarct (HCC) 5/2015    silent MI   • Osteoporosis    • Other specified disorder of intestines     colitis, bloating, constipation   • Pain     fibromyalgia   • Pneumonia     4/4/2011   • Psychiatric problem     depression   • Pulmonary embolism (HCC)     left lung   • Stroke (HCC) 2009    right weakness   • Urinary bladder disorder     hematuria chronic       Past Surgical History:   Procedure Laterality Date   • RECOVERY  6/11/2015    Procedure:  CATH LAB  [facility] WITH POSSFernanda DUBOIS ICD; 794.30;  Surgeon: Recoveryonly Surgery;  Location: SURGERY PRE-POST PROC UNIT [facility];  Service:    • KNEE ARTHROSCOPY  10/8/2009    Performed by JAYSHREE VIVEROS at SURGERY [facility] ORS   • MEDIAL MENISCECTOMY  10/8/2009    Performed by JAYSHREE VIVEROS at SURGERY [facility] ORS   • MENISCECTOMY  10/8/2009    Performed by JAYSHREE VIVEROS at SURGERY [facility] ORS   • ALHAJI BY LAPAROSCOPY  4/19/2009    Performed by GANSER, JOHN H at SURGERY [facility] ORS   • ROTATOR CUFF REPAIR  2000    left   • HYSTERECTOMY, TOTAL ABDOMINAL  1973       Allergies: Contrast media with iodine [iodine] and Tequin [gatifloxacin sesquihydrate]    Family History   Problem Relation Age of Onset   • Anesthesia Father         difficulty waking post anesthesia   • Diabetes Unknown    • Heart Disease " "Unknown    • Hypertension Unknown    • Stroke Unknown    • Cancer Unknown        Physical Examination    Vitals:    05/05/20 0946   Height: 1.575 m (5' 2\")   Weight: 61.7 kg (136 lb)   Weight % change since last entry.: 0 %   BP: 126/70   Pulse: 98   BMI (Calculated): 24.87       General Appearance: alert, no distress  Skin: Skin color, texture, turgor normal. No rashes or lesions.  Eyes: negative  Oropharynx: Lips, mucosa, and tongue normal.  Edentulous, gums normal. Oropharynx moist and without lesion  Lungs: positive findings: Forced expiratory wheeze  Heart: negative. RRR without murmur, gallop, or rubs.  No ectopy.  Abdomen: Abdomen soft, non-tender. . No masses,  No organomegaly  Extremities:  No deformities, edema, or skin discoloration  Joints: No acute arthritis  Peripheral Pulses:perfused  Neurologic: intact grossly  No oral thrush    II (soft palate, uvula, fauces visible)    Imaging: Prior x-ray was clear, reconsider at next visit in office if available    PFTS: Planned for next visit      Assessment and Plan  1. COPD with asthma (HCC)  Continue Symbicort and rescue inhaler  - PULMONARY FUNCTION TESTS -Test requested: Complete Pulmonary Function Test; Future  - Exercise Test for Bronchospasm / 6-Minute Walk; Future    2. Hypoxia  Oxygen 3 L, nighttime and as needed  - PULMONARY FUNCTION TESTS -Test requested: Complete Pulmonary Function Test; Future  - Exercise Test for Bronchospasm / 6-Minute Walk; Future    3. Pulmonary embolism without acute cor pulmonale, unspecified chronicity, unspecified pulmonary embolism type (HCC)  Remote history with prior orthopedic surgery, continues on Plavix      Followup Return in about 6 months (around 11/5/2020) for follow up visit with Dr. Katherine Lopez.    "

## 2020-05-05 NOTE — PATIENT INSTRUCTIONS
This lady comes in with her daughter Riddhi, she speaks Telugu, her daughter is a registered nurse, and interprets very effectively.    This lady is part-time in [place] part-time in [place], but with her daughter more often recently and needs local pulmonary follow-up.  [facility], she is on Symbicort pro-air and nighttime oxygen.  She remains active and vigorous, at age 84.    Intermittent sputum production, no smoking history, and x-ray done 1 year ago was clear.  When she returns for follow-up visit in the fall, if we have x-ray facilities available we will have a simple x-ray done at that time, I do not see a need to pursue one now as her breathing pattern is good her exercise capacity is good and there is no deterioration in her status recently.    She and her daughter are aware of COVID, her daughter works in [employer], [employer], and has very good insight.  She understands that if Navya develops fever cough or shortness of breath in combination she would need to be checked in a healthcare facility for possible virus infection.    This lady seems to be doing very well, her maintenance inhaler of Symbicort and rescue inhaler are noted.  Flu vaccine was not administered this past year, I stressed the urgency on an annual basis of getting the flu vaccine, Pneumovax and Prevnar are current, we will see her in the fall and plan for that.    The last issue is anxiety, her daughter indicates that sometimes her breathing pattern is altered and I did explain that if she is anxious, using pursed lip breathing techniques, her daughter explained that to her.  We will reassess in 6 months sooner for problems

## 2020-10-20 ENCOUNTER — OFFICE VISIT (OUTPATIENT)
Dept: PULMONOLOGY | Facility: HOSPICE | Age: 85
End: 2020-10-20
Payer: MEDICARE

## 2020-10-20 VITALS
WEIGHT: 135 LBS | TEMPERATURE: 98 F | BODY MASS INDEX: 24.84 KG/M2 | OXYGEN SATURATION: 97 % | RESPIRATION RATE: 14 BRPM | HEIGHT: 62 IN | HEART RATE: 68 BPM | DIASTOLIC BLOOD PRESSURE: 72 MMHG | SYSTOLIC BLOOD PRESSURE: 114 MMHG

## 2020-10-20 DIAGNOSIS — J44.89 COPD WITH ASTHMA (HCC): ICD-10-CM

## 2020-10-20 PROCEDURE — 99214 OFFICE O/P EST MOD 30 MIN: CPT | Performed by: INTERNAL MEDICINE

## 2020-10-20 RX ORDER — BUDESONIDE AND FORMOTEROL FUMARATE DIHYDRATE 80; 4.5 UG/1; UG/1
2 AEROSOL RESPIRATORY (INHALATION) 2 TIMES DAILY
COMMUNITY

## 2020-10-20 ASSESSMENT — PAIN SCALES - GENERAL: PAINLEVEL: NO PAIN

## 2020-10-20 ASSESSMENT — FIBROSIS 4 INDEX: FIB4 SCORE: 1.924280941769455569

## 2020-10-20 NOTE — PATIENT INSTRUCTIONS
Navya comes in today with her daughter Riddhi, Navya speak Croatian and her daughter is an excellent .  We were going to look at lung function testing and a 6-minute walk today, she did not know about the scheduling and on looking at her today her saturations are excellent she does have oxygen for nighttime and occasional daytime use and understands the use of an oximeter to track and follow.  Her daughter has some medical training.    She is doing well, has some musculoskeletal back discomfort but her pulmonary status is excellent.  Lung fields are entirely clear.  She plans to get the flu shot from Dr. Rodriguez in the near future.  I do think we need to revisit her lung function testing her 6-minute walk test and less her situation changes, she is not requiring the use of her Symbicort presently and the rescue inhaler is only used occasionally.  We can check her on an annual basis but sooner if problems occur.  She spends time in [facility] with her family, has good medical follow-up.

## 2020-10-20 NOTE — PROGRESS NOTES
Navya Mcbride is a 85 y.o. female here for reactive airways on inhalers and nighttime oxygen. Patient was referred by her primary care.    History of Present Illness:    Navya comes in today with her daughter Riddhi, Navya speaks Yoruba and her daughter is an excellent .  We were going to look at lung function testing and a 6-minute walk today, she did not know about the scheduling and on looking at her today her saturations are excellent she does have oxygen for nighttime and occasional daytime use and understands the use of an oximeter to track and follow.  Her daughter has some medical training.    She is doing well, has some musculoskeletal back discomfort but her pulmonary status is excellent.  Lung fields are entirely clear.  She plans to get the flu shot from Dr. Rodriguez in the near future.  I do think we need to revisit her lung function testing her 6-minute walk test and less her situation changes, she is not requiring the use of her Symbicort presently and the rescue inhaler is only used occasionally.  We can check her on an annual basis but sooner if problems occur.  She spends time in [facility] with her family, has good medical follow-up.  Constitutional ROS: No unexpected change in weight, No unexplained fevers  Eyes: No change in vision or blurring or double vision  Mouth/Throat ROS: No sore throat, No recent change in voice or hoarseness  Pulmonary ROS: See present history for pertinent positives  Cardiovascular ROS: No chest pain to suggest acute coronary syndrome  Gastrointestinal ROS: No abdominal pain to suggest peptic disease  Musculoskeletal/Extremities ROS: no acute artritis or unusual swelling  Hematologic/Lymphatic ROS: No easy bleeding or unusual lymph node swelling  Neurologic ROS: No new or unusual weakness  Psychiatric ROS: No hallucinations  Allergic/Immunologic: No  urticaria or allergic rash      Current Outpatient Medications   Medication Sig Dispense  "Refill   • budesonide-formoterol (SYMBICORT) 80-4.5 MCG/ACT Aerosol Inhale 2 Puffs by mouth 2 Times a Day.     • OXYCODONE-ACETAMINOPHEN PO Take  by mouth.     • metoprolol (LOPRESSOR) 25 MG Tab Take 0.5 Tabs by mouth 2 times a day. 30 Tab 0   • aspirin EC (ECOTRIN) 81 MG Tablet Delayed Response Take 81 mg by mouth every day.     • LORazepam (ATIVAN) 2 MG tablet Take 2 mg by mouth 2 Times a Day.     • cyclobenzaprine (FLEXERIL) 10 MG Tab Take 10 mg by mouth 3 times a day as needed for Muscle Spasms.     • albuterol 108 (90 Base) MCG/ACT Aero Soln inhalation aerosol Inhale 2 Puffs by mouth every 6 hours as needed for Shortness of Breath.     • esomeprazole (NEXIUM) 40 MG delayed-release capsule Take 40 mg by mouth every morning before breakfast.     • celecoxib (CELEBREX) 200 MG Cap Take 200 mg by mouth every day.     • duloxetine (CYMBALTA) 30 MG CPEP Take 30 mg by mouth every day.     • clopidogrel (PLAVIX) 75 MG TABS Take 75 mg by mouth every day.     • cyclosporin (RESTASIS) 0.05 % ophthalmic emulsion Place 1 Drop in both eyes 2 times a day.     • nitroglycerin (NITROSTAT) 0.4 MG SUBL Place 1 Tab under tongue as needed for Chest Pain. 25 Tab 1   • AMITIZA 8 MCG PO CAPS Take 8 mcg by mouth 2 Times a Day.     • diphenhydrAMINE-zinc acetate cream Apply 1 Each to affected area(s) 2 times a day as needed (itch). 1 Tube 0     No current facility-administered medications for this visit.        Social History     Tobacco Use   • Smoking status: Never Smoker   • Smokeless tobacco: Never Used   Substance Use Topics   • Alcohol use: No   • Drug use: No        Past Medical History:   Diagnosis Date   • Anemia     chronic   • Anesthesia     \"hard to wake up\"   • Arthritis     hands, states RA and Osteo   • Asthma     uses inhaler   • Backpain     compressed discs   • Breath shortness     oxygen 2L N/C prn   • CATARACT    • Clostridium difficile infection 2009   • Coarse tremors    • Colitis    • Dental disorder     chronic " "gum infection   • Fibromyalgia    • Gastritis    • Heart burn    • Hiatus hernia syndrome    • Indigestion    • Myocardial infarct (HCC) 5/2015    silent MI   • Osteoporosis    • Other specified disorder of intestines     colitis, bloating, constipation   • Pain     fibromyalgia   • Pneumonia     4/4/2011   • Psychiatric problem     depression   • Pulmonary embolism (HCC)     left lung   • Stroke (HCC) 2009    right weakness   • Urinary bladder disorder     hematuria chronic       Past Surgical History:   Procedure Laterality Date   • RECOVERY  6/11/2015    Procedure:  CATH LAB  [facility] WITH POSSFernanda DUBOIS ICD; 794.30;  Surgeon: Recoveryondenise Surgery;  Location[place] PRE[place] PROC [place] [facility] [place]  Service:    • KNEE ARTHROSCOPY  10/8/2009    Performed by JAYSHREE VIVEROS at SURGERY [facility] ORS   • MEDIAL MENISCECTOMY  10/8/2009    Performed by JAYSHREE VIVEROS at SURGERY [facility] ORS   • MENISCECTOMY  10/8/2009    Performed by JAYSHREE VIVREOS at SURGERY [facility] ORS   • ALHAJI BY LAPAROSCOPY  4/19/2009    Performed by GANSER, JOHN H at SURGERY [facility] ORS   • ROTATOR CUFF REPAIR  2000    left   • HYSTERECTOMY, TOTAL ABDOMINAL  1973       Allergies: Contrast media with iodine [iodine] and Tequin [gatifloxacin sesquihydrate]    Family History   Problem Relation Age of Onset   • Anesthesia Father         difficulty waking post anesthesia   • Diabetes Other    • Heart Disease Other    • Hypertension Other    • Stroke Other    • Cancer Other        Physical Examination    Vitals:    10/20/20 1408   Height: 1.575 m (5' 2\")   Weight: 61.2 kg (135 lb)   Weight % change since last entry.: 0 %   BP: 114/72   Pulse: 68   BMI (Calculated): 24.69   Resp: 14   Temp: 36.7 °C (98 °F)   TempSrc: Temporal       General Appearance: alert, no distress  Skin: Skin color, texture, turgor normal. No rashes or lesions.  Eyes: negative  Oropharynx: Lips, mucosa, and tongue normal. Teeth and gums normal. Oropharynx moist and " without lesion  Lungs: positive findings: Remarkably quiet and clear  Heart: negative. RRR without murmur, gallop, or rubs.  No ectopy.  Abdomen: Abdomen soft, non-tender. . No masses,  No organomegaly  Extremities:  No deformities, edema, or skin discoloration  Joints: No acute arthritis  Peripheral Pulses:perfused  Neurologic: intact grossly  No oropharyngeal thrush      Imaging: None today    PFTS: Deferred, see discussion above      Assessment and Plan  1. COPD with asthma (HCC)  Well-controlled, does not require maintenance inhaler      Followup Return in about 1 year (around 10/20/2021) for With MD or APRN.

## 2020-12-22 ENCOUNTER — OFFICE VISIT (OUTPATIENT)
Dept: URGENT CARE | Facility: PHYSICIAN GROUP | Age: 85
End: 2020-12-22
Payer: MEDICARE

## 2020-12-22 VITALS
HEIGHT: 62 IN | DIASTOLIC BLOOD PRESSURE: 72 MMHG | OXYGEN SATURATION: 94 % | RESPIRATION RATE: 16 BRPM | BODY MASS INDEX: 24.18 KG/M2 | HEART RATE: 93 BPM | SYSTOLIC BLOOD PRESSURE: 134 MMHG | TEMPERATURE: 97.9 F | WEIGHT: 131.4 LBS

## 2020-12-22 DIAGNOSIS — R53.81 MALAISE AND FATIGUE: ICD-10-CM

## 2020-12-22 DIAGNOSIS — R53.83 MALAISE AND FATIGUE: ICD-10-CM

## 2020-12-22 DIAGNOSIS — R19.7 DIARRHEA, UNSPECIFIED TYPE: Primary | ICD-10-CM

## 2020-12-22 LAB
APPEARANCE UR: CLEAR
BILIRUB UR STRIP-MCNC: NORMAL MG/DL
COLOR UR AUTO: NORMAL
GLUCOSE UR STRIP.AUTO-MCNC: NORMAL MG/DL
KETONES UR STRIP.AUTO-MCNC: 15 MG/DL
LEUKOCYTE ESTERASE UR QL STRIP.AUTO: NORMAL
NITRITE UR QL STRIP.AUTO: NORMAL
PH UR STRIP.AUTO: 7.5 [PH] (ref 5–8)
PROT UR QL STRIP: NORMAL MG/DL
RBC UR QL AUTO: NORMAL
SP GR UR STRIP.AUTO: 1.02
UROBILINOGEN UR STRIP-MCNC: 0.2 MG/DL

## 2020-12-22 PROCEDURE — 81002 URINALYSIS NONAUTO W/O SCOPE: CPT | Performed by: NURSE PRACTITIONER

## 2020-12-22 PROCEDURE — 99214 OFFICE O/P EST MOD 30 MIN: CPT | Performed by: NURSE PRACTITIONER

## 2020-12-22 RX ORDER — METOPROLOL SUCCINATE 25 MG/1
TABLET, EXTENDED RELEASE ORAL
COMMUNITY
Start: 2020-10-16 | End: 2020-12-23

## 2020-12-22 RX ORDER — LORAZEPAM 0.5 MG/1
TABLET ORAL
COMMUNITY
Start: 2020-09-28 | End: 2020-12-23

## 2020-12-22 RX ORDER — TIZANIDINE 4 MG/1
TABLET ORAL
COMMUNITY
Start: 2020-10-16 | End: 2022-02-14

## 2020-12-22 ASSESSMENT — FIBROSIS 4 INDEX: FIB4 SCORE: 1.924280941769455569

## 2020-12-22 ASSESSMENT — ENCOUNTER SYMPTOMS
HEADACHES: 1
DIARRHEA: 1

## 2020-12-22 NOTE — PROGRESS NOTES
Subjective:      Navya Mcbride is a 85 y.o. female who presents with Diarrhea, loss of appetite      Patient comes into urgent care accompanied by her daughter who is translating for her today.  Patient's other daughter who is an RN got in the phone and has concerns for many chronic conditions..  Patient has a history of C. difficile.  She is concerned because she is having loose stools however she states that the stools are only approximately 2-4 times a day and they are not clear liquid.  She also does not understand how her mother can be the weight she is at when she does not eat very much.  Patient states she has chronic shoulder pains bilaterally.  She also complains of tingling around her lips.  She does have her PCP and patient is advised to follow-up.  We agreed to draw some labs today to help PCP follow-up.  She did have positive COVID-19 results more than 2 weeks ago.    Diarrhea   This is a new problem. The current episode started 1 to 4 weeks ago. The problem occurs 2 to 4 times per day. The problem has been unchanged. The stool consistency is described as mucous. The patient states that diarrhea does not awaken her from sleep. Associated symptoms include headaches. Pertinent negatives include no abdominal pain, chills, coughing, fever, myalgias, vomiting or weight loss. Nothing aggravates the symptoms. There are no known risk factors. She has tried increased fluids for the symptoms. The treatment provided mild relief. There is no history of inflammatory bowel disease.       Review of Systems   Constitutional: Negative for chills, fever, malaise/fatigue and weight loss.   HENT: Negative for congestion, ear pain, nosebleeds and sore throat.    Respiratory: Negative for cough, shortness of breath and wheezing.    Cardiovascular: Negative for chest pain, palpitations, orthopnea and leg swelling.   Gastrointestinal: Positive for diarrhea. Negative for abdominal pain, constipation, heartburn,  "nausea and vomiting.   Genitourinary: Negative for dysuria, frequency and urgency.   Musculoskeletal: Negative for back pain, joint pain and myalgias.   Skin: Negative for rash.   Neurological: Positive for headaches. Negative for dizziness and tingling.   Psychiatric/Behavioral: Negative for memory loss and suicidal ideas.   All other systems reviewed and are negative.         Objective:     /72 (BP Location: Left arm, Patient Position: Sitting, BP Cuff Size: Adult)   Pulse 93   Temp 36.6 °C (97.9 °F) (Temporal)   Resp 16   Ht 1.575 m (5' 2\")   Wt 59.6 kg (131 lb 6.4 oz)   SpO2 94%   BMI 24.03 kg/m²      Physical Exam  Vitals signs reviewed.   Constitutional:       General: She is not in acute distress.     Appearance: Normal appearance. She is not ill-appearing.   HENT:      Head: Normocephalic.      Right Ear: External ear normal.      Left Ear: External ear normal.      Nose: Nose normal. No congestion or rhinorrhea.      Mouth/Throat:      Mouth: Mucous membranes are moist.   Eyes:      Extraocular Movements: Extraocular movements intact.      Conjunctiva/sclera: Conjunctivae normal.   Neck:      Musculoskeletal: Normal range of motion and neck supple. No neck rigidity or muscular tenderness.   Cardiovascular:      Rate and Rhythm: Normal rate and regular rhythm.      Pulses: Normal pulses.      Heart sounds: Normal heart sounds. No murmur.   Pulmonary:      Effort: Pulmonary effort is normal. No respiratory distress.      Breath sounds: Normal breath sounds. No wheezing, rhonchi or rales.   Abdominal:      General: Abdomen is flat. Bowel sounds are normal. There is no distension.      Palpations: Abdomen is soft. There is no mass or pulsatile mass.      Tenderness: There is no abdominal tenderness. There is no right CVA tenderness, left CVA tenderness, guarding or rebound.   Musculoskeletal: Normal range of motion.         General: No swelling, tenderness, deformity or signs of injury.      Right " lower leg: No edema.      Left lower leg: No edema.   Lymphadenopathy:      Cervical: No cervical adenopathy.   Skin:     General: Skin is warm and dry.      Capillary Refill: Capillary refill takes less than 2 seconds.   Neurological:      Mental Status: She is alert and oriented to person, place, and time.   Psychiatric:         Mood and Affect: Mood normal.         Behavior: Behavior normal.                 Lab Results/POC Test Results   Results for orders placed or performed in visit on 12/22/20   POCT Urinalysis   Result Value Ref Range    POC Color Dark Yellow Negative    POC Appearance Clear Negative    POC Leukocyte Esterase Neg Negative    POC Nitrites Neg Negative    POC Urobiligen 0.2 Negative (0.2) mg/dL    POC Protein Neg Negative mg/dL    POC Urine PH 7.5 5.0 - 8.0    POC Blood TR-Intact Negative    POC Specific Gravity 1.020 <1.005 - >1.030    POC Ketones 15 Negative mg/dL    POC Bilirubin Neg Negative mg/dL    POC Glucose Neg Negative mg/dL           Assessment/Plan:        1. Diarrhea, unspecified type  CBC WITH DIFFERENTIAL    Comp Metabolic Panel    CULTURE STOOL    VITAMIN B12    POCT Urinalysis   2. Malaise and fatigue  CBC WITH DIFFERENTIAL    Comp Metabolic Panel    CULTURE STOOL    VITAMIN B12     Plan of care, medications and treatments reviewed with patient and or guardian.  Patient and or guardian voices understanding and agrees with the instructions provided. After visit summary reviewed with patient. Patient and or guardian understand the parameters for reevaluation and ER precautions discussed.     Follow up with primary care provider in the next 1-5 days for recheck as needed.  Discussed that urgent care setting has limited resources, therefore any worsening of symptoms should be evaluated in the ER. Patient and or guardian verbalized understanding.     Please note that this dictation was created using voice recognition software. I have made every reasonable attempt to correct obvious  errors, but I expect that there are errors of grammar and possibly content that I did not discover before finalizing the note.

## 2020-12-23 ASSESSMENT — ENCOUNTER SYMPTOMS
SHORTNESS OF BREATH: 0
MYALGIAS: 0
FEVER: 0
SORE THROAT: 0
COUGH: 0
NAUSEA: 0
PALPITATIONS: 0
ORTHOPNEA: 0
CHILLS: 0
WEIGHT LOSS: 0
WHEEZING: 0
MEMORY LOSS: 0
CONSTIPATION: 0
ABDOMINAL PAIN: 0
TINGLING: 0
BACK PAIN: 0
DIZZINESS: 0
VOMITING: 0
HEARTBURN: 0

## 2021-01-14 DIAGNOSIS — Z23 NEED FOR VACCINATION: ICD-10-CM

## 2021-06-12 ENCOUNTER — OFFICE VISIT (OUTPATIENT)
Dept: URGENT CARE | Facility: PHYSICIAN GROUP | Age: 86
End: 2021-06-12
Payer: MEDICARE

## 2021-06-12 VITALS
OXYGEN SATURATION: 97 % | HEART RATE: 97 BPM | SYSTOLIC BLOOD PRESSURE: 116 MMHG | WEIGHT: 135 LBS | HEIGHT: 62 IN | DIASTOLIC BLOOD PRESSURE: 60 MMHG | RESPIRATION RATE: 16 BRPM | TEMPERATURE: 99.5 F | BODY MASS INDEX: 24.84 KG/M2

## 2021-06-12 DIAGNOSIS — M25.511 CHRONIC PAIN OF BOTH SHOULDERS: ICD-10-CM

## 2021-06-12 DIAGNOSIS — H92.22 BLEEDING FROM LEFT EAR: ICD-10-CM

## 2021-06-12 DIAGNOSIS — G89.29 CHRONIC EAR PAIN, UNSPECIFIED LATERALITY: ICD-10-CM

## 2021-06-12 DIAGNOSIS — G89.29 CHRONIC PAIN OF BOTH SHOULDERS: ICD-10-CM

## 2021-06-12 DIAGNOSIS — H93.19 TINNITUS, UNSPECIFIED LATERALITY: ICD-10-CM

## 2021-06-12 DIAGNOSIS — M25.512 CHRONIC PAIN OF BOTH SHOULDERS: ICD-10-CM

## 2021-06-12 DIAGNOSIS — H92.09 CHRONIC EAR PAIN, UNSPECIFIED LATERALITY: ICD-10-CM

## 2021-06-12 DIAGNOSIS — H10.9 CONJUNCTIVITIS, UNSPECIFIED CONJUNCTIVITIS TYPE, UNSPECIFIED LATERALITY: ICD-10-CM

## 2021-06-12 DIAGNOSIS — H66.91 RIGHT OTITIS MEDIA, UNSPECIFIED OTITIS MEDIA TYPE: ICD-10-CM

## 2021-06-12 PROCEDURE — 99214 OFFICE O/P EST MOD 30 MIN: CPT | Performed by: NURSE PRACTITIONER

## 2021-06-12 RX ORDER — POLYMYXIN B SULFATE AND TRIMETHOPRIM 1; 10000 MG/ML; [USP'U]/ML
1 SOLUTION OPHTHALMIC EVERY 4 HOURS
Qty: 10 ML | Refills: 0 | Status: SHIPPED | OUTPATIENT
Start: 2021-06-12 | End: 2021-06-19

## 2021-06-12 RX ORDER — AMOXICILLIN 875 MG/1
875 TABLET, COATED ORAL 2 TIMES DAILY
Qty: 14 TABLET | Refills: 0 | Status: SHIPPED | OUTPATIENT
Start: 2021-06-12 | End: 2021-06-19

## 2021-06-12 ASSESSMENT — ENCOUNTER SYMPTOMS
CHILLS: 0
RESPIRATORY NEGATIVE: 1
EYE REDNESS: 1
EYE DISCHARGE: 1
CONSTITUTIONAL NEGATIVE: 1
FEVER: 0
CARDIOVASCULAR NEGATIVE: 1

## 2021-06-12 ASSESSMENT — VISUAL ACUITY: OU: 1

## 2021-06-12 NOTE — PATIENT INSTRUCTIONS
Otalgia  (Otalgia)  La otalgia es dolor en o alrededor del oído. Cuando el dolor se ubica en el mismo oído, se denomina otalgia primaria. El dolor también puede provenir de algún otro lado, aiden de la oskar o el génesis. [place] se denomina otalgia secundaria.   CAUSAS  Entre las causas de la otalgia primaria se incluyen:  · Infección en el oído medio  · También puede estar causada por ranjeet lesión en el oído o infección del canal auditivo (oído del nadador). El oído del nadador provoca dolor, inflamación y a menudo ranjeet secreción del canal auditivo.  Entre las causas de la otalgia secundaria se incluyen:  · Infección sinusal.  · Alergias y resfríos que provocan congestión de la nariz y los tubos que drenan los oídos (tubos de sergei).  · Problemas dentales aiden caries, infecciones en las encías o dentición.  · Dolor de garganta (tonsilitis y faringitis)  · Ganglios hinchados en el génesis.  · Infección en un hueso detrás del oído (mastoiditis).  · Molestia temporomandibular (problemas en la articulación que se encuentra entre la mandíbula y el cráneo).  · Otros problemas aiden trastornos nerviosos, problemas de circulación, problemas cardíacos y tumores de la oskar y el génesis también pueden causar esos síntomas. [place] es muy poco común.  DIAGNÓSTICO  Evaluación, diagnostico y análisis:  · Se recomienda un examen médico para evaluar y diagnosticar la causa de la otalgia.  · Se podrán pedir otras pruebas o ranjeet derivación a un especialista si la causa del dolor de oído no se encuentra y los síntomas persisten.  TRATAMIENTO  · El médico podrá prescribirle antibióticos si se diagnostica ranjeet infección en el oído.  · Se podrán recomendar medicamentos para aliviar el dolor y analgésicos de uso tópico.  · Es importante martine estos medicamentos glenroy aiden se prescriben.  INSTRUCCIONES PARA EL CUIDADO DOMICILIARIO  · Podrá ser útil dormir con el oído afectado hacia arriba.  · Ranjeet compresa caliente sobre la felecia del dolor podrá  proporcionar alivio.  · Mientras dure el dolor, ranjeet dieta de alimentos blandos y evitar el chicle podrá ser de utilidad.  SOLICITE ATENCIÓN MÉDICA DE INMEDIATO SI:  · Presenta grant intensos, fiebre skye, vómitos repetidos o deshidratación.  · Mareos intensos, dolor de oskar, confusión, zumbidos en los oídos (tinnitus) o pérdida de la audición.  Document Released: 12/18/2006 Document Revised: 03/11/2013  [employer]® Patient Information ©2014 [employer].        Tinnitus  Tinnitus  El término tinnitus hace referencia a la percepción de un dea que no se corresponde con ninguna judy real para jenelle dea. A menudo se lo describe aiden zumbido de oídos. Sin embargo, las personas que sufren esta afección pueden percibir diferentes ruidos, en christian o ambos oídos.  Los sonidos del tinnitus pueden ser suaves, tana o de intensidad intermedia. El tinnitus puede prolongarse pocos segundos o ser andrea hermes varios días. Puede desaparecer sin tratamiento y regresar en distintos momentos. Cuando el tinnitus es permanente u ocurre con frecuencia, puede causar otros problemas, por ejemplo, dificultad para dormir y para concentrarse.  Argenis todas las personas tienen tinnitus en algún momento. El tinnitus a tara plazo (crónico) o que regresa con frecuencia (recurrente) es un problema que puede requerir atención médica.  ¿Cuáles son las causas?  A menudo se desconoce la causa del tinnitus. En algunos casos, puede ser el resultado de otros problemas u otras afecciones, entre ellas:  · Exposición a ruidos tana de maquinarias, música u otras benoit.  · Pérdida auditiva.  · Infecciones de los oídos o de los senos paranasales.  · Acumulación de cerumen.  · Un objeto (cuerpo extraño) atascado en el oído.  · Karen ciertos medicamentos.  · El consumo de alcohol o cafeína.  · Presión arterial skye.  · Enfermedades cardíacas.  · Anemia.  · Alergias.  · Enfermedad de [place].  · Problemas de tiroides.  · Tumores.  · Un vaso  sanguíneo débil o abultado (aneurisma) cerca del oído.  · Depresión u otros trastornos del estado de ánimo.  ¿Cuáles son los signos o los síntomas?  El principal síntoma de tinnitus es la percepción de un dea que no se corresponde con ninguna judy, no proviene de ningún lugar. El dea puede percibirse aiden lo siguiente:  · Vibración.  · Crepitación.  · Zumbido.  · Soplido del aire, similar al dea que se percibe en ranjeet caracola.  · Sibilancia.  · Silbido.  · Chisporroteo.  · Runrún.  · Ranjeet corriente de agua.  · Ranjeet nota musical.  · Golpecitos.  Es posible que los síntomas afecten un solo oído (unilateral) o ambos oídos (bilateral).  ¿Cómo se diagnostica?  El tinnitus se diagnostica en función de los síntomas, antecedentes médicos y un examen físico. El médico puede realizar ranjeet prueba auditiva exhaustiva (examen de audición) si el tinnitus:  · Es unilateral.  · Causa dificultades auditivas.  · Dura más de 6 meses.  Usted puede trabajar con un médico especialista en trastornos auditivos (audiólogo). Le pueden hacer preguntas sobre mary ellen síntomas y cómo estos afectan blake serge diaria. Es posible que le sandy algunas pruebas, por ejemplo:  · Exploración por tomografía computarizada (TC).  · Resonancia magnética (RM).  · Un estudio de diagnóstico por imágenes de cómo fluye la gurinder a través de los vasos sanguíneos (angiografía).  ¿Cómo se trata?  A veces, el tratamiento de ranjeet enfermedad preexistente hace que el tinnitus desaparezca. Si el tinnitus continúa, probablemente debe realizar alguno de los siguientes tratamientos, entre otros:  · Medicamentos, aiden antidepresivos o pastillas para dormir.  · Generadores de dea para enmascarar el tinnitus. Estos incluyen los siguientes:  ? Aparatos de dea de choi que reproducen sonidos relajantes para ayudarlo a dormir.  ? Dispositivos inteligentes que se adaptan al oído y reproducen sonidos o música.  ? Estimulación acústica neuronal. Maili implica usar auriculares  para escuchar música que contiene ranjeet señal auditiva. Con el tiempo, escuchar esta señal puede modificar las redes del cerebro y reducir la sensibilidad al tinnitus. Radha tratamiento se usa en casos muy graves cuando ningún otro tratamiento resulta eficaz.  · Terapia y orientación para ayudarlo a controlar el estrés que significa vivir con tinnitus.  · El uso de audífonos o implantes cocleares, si el tinnitus guarda relación con la pérdida de la audición. Los audífonos se usan en el oído externo. Implantes cocleares se colocan quirúrgicamente en el oído interno.  Siga estas indicaciones en blake casa:  Controlar los síntomas         · Cuando sea posible, no permanezca en lugares ruidosos y no se exponga a sonidos tana.  · Use dispositivos de protección de la audición, por ejemplo, tapones, cuando esté expuesto a ruidos tana.  · Use un aparato de dea de fondo, un humidificador u otros dispositivos para enmascarar el dea del tinnitus.  · Ponga en práctica técnicas para reducir el estrés, aiden meditación, yoga o respiración profunda. Trabaje con el médico si necesita ayuda para manejar el estrés.  · Duerma con la oskar levemente elevada. [place] puede reducir el impacto del tinnitus.  Instrucciones generales  · No use sustancias estimulantes, aiden nicotina, alcohol o cafeína. Hable con el médico sobre otros estimulantes que deba evitar. Los estimulantes son sustancias que pueden hacer que se sienta alerta y atento al aumentar determinadas actividades en el cuerpo (por ejemplo, la frecuencia cardíaca y la presión arterial). Estas sustancias pueden empeorar el tinnitus.  · [place] los medicamentos de venta holly y los recetados solamente aiden se lo haya indicado el médico.  · Intente dormir mucho todas las noches.  · Concurra a todas las visitas de control aiden se lo haya indicado el médico. [place] es importante.  Comuníquese con un médico si:  · El tinnitus se prolonga hermes 3 semanas o más tiempo y no se  detiene.  · Los síntomas empeoran o no mejoran con los cuidados en el hogar.  · Experimenta tinnitus después de sufrir ranjeet lesión en la oskar.  · Tiene tinnitus junto con alguno de estos síntomas:  ? Mareos.  ? Pérdida del equilibrio.  ? Náuseas y vómitos.  Resumen  · El término tinnitus hace referencia a la percepción de un dea que no se corresponde con ninguna judy real para jenelle dea. A menudo se lo describe aiden zumbido de oídos.  · Es posible que los síntomas afecten un solo oído (unilateral) o ambos oídos (bilateral).  · Use un aparato de dea de fondo, un humidificador u otros dispositivos para enmascarar el dea del tinnitus.  · No use sustancias estimulantes, aiden nicotina, alcohol o cafeína. Hable con el médico sobre otros estimulantes que deba evitar. Estas sustancias pueden empeorar el tinnitus.  Esta información no tiene aiden fin reemplazar el consejo del médico. Asegúrese de hacerle al médico cualquier pregunta que tenga.  Document Released: 12/18/2006 Document Revised: 03/30/2019 Document Reviewed: 01/02/2019  Elsevier Patient Education © 2020 [employer]      A referral request has been placed for ENT (ear, nose, throat). We have a referrals department that is tasked with locating a suitable office that currently accepts patients and your insurance and you should be receiving referral information from them such as the office name, address, and number. This process usually takes around 3 business days. If you are not contacted by our referrals department, please call [phone].

## 2021-06-12 NOTE — PROGRESS NOTES
Subjective:     Navya Mcbride is a 86 y.o. female who presents for Otalgia (right ear was bleeding last night )       Otalgia   This is a new problem. The problem has been gradually worsening. Associated symptoms include ear discharge.     Patient brought in by her daughter.  History obtained from daughter.  Patient lives with daughter.    Patient has chronic right ear pain x6 months.  Reports ringing in the ears.  Has seen multiple providers.  Reports that last night, she started to notice bleeding from the left ear.  Reports using an unspecified antibiotic/analgesic eardrop with no improvement in the symptoms.    Daughter also reports noticing bilateral red eyes with drainage from the right eye.  Patient has a history of dry eyes and uses Restasis.  Tried also using OTC eyedrops with no improvement in the symptoms.    Patient complaining of bilateral shoulder pain which worsens with ROM. No trauma.    Patient was screened prior to rooming and daughter denied COVID-19 diagnosis or contact with a person who has been diagnosed or is suspected to have COVID-19. During this visit, appropriate PPE was worn, hand hygiene was performed, and the patient and any visitors were masked.     PMH:  has a past medical history of Anemia, Anesthesia, Arthritis, Asthma, Backpain, Breath shortness, CATARACT, Clostridium difficile infection (2009), Coarse tremors, Colitis, Dental disorder, Fibromyalgia, Gastritis, Heart burn, Hiatus hernia syndrome, Indigestion, Myocardial infarct ([facility]) (5/2015), Osteoporosis, Other specified disorder of intestines, Pain, Pneumonia, Psychiatric problem, Pulmonary embolism (HCC), Stroke ([facility]) (2009), and Urinary bladder disorder.    MEDS:   Current Outpatient Medications:   •  amoxicillin (AMOXIL) 875 MG tablet, Take 1 tablet by mouth 2 times a day for 7 days., Disp: 14 tablet, Rfl: 0  •  polymixin-trimethoprim (POLYTRIM) 76779-7.1 UNIT/ML-% Solution, Administer 1 Drop into both eyes every 4  hours for 7 days., Disp: 10 mL, Rfl: 0  •  tizanidine (ZANAFLEX) 4 MG Tab, , Disp: , Rfl:   •  budesonide-formoterol (SYMBICORT) 80-4.5 MCG/ACT Aerosol, Inhale 2 Puffs by mouth 2 Times a Day., Disp: , Rfl:   •  OXYCODONE-ACETAMINOPHEN PO, Take 5 mg by mouth., Disp: , Rfl:   •  metoprolol (LOPRESSOR) 25 MG Tab, Take 0.5 Tabs by mouth 2 times a day., Disp: 30 Tab, Rfl: 0  •  aspirin EC (ECOTRIN) 81 MG Tablet Delayed Response, Take 81 mg by mouth every day., Disp: , Rfl:   •  LORazepam (ATIVAN) 2 MG tablet, Take 0.5 mg by mouth 2 times a day., Disp: , Rfl:   •  cyclobenzaprine (FLEXERIL) 10 MG Tab, Take 10 mg by mouth 3 times a day as needed for Muscle Spasms., Disp: , Rfl:   •  albuterol 108 (90 Base) MCG/ACT Aero Soln inhalation aerosol, Inhale 2 Puffs by mouth every 6 hours as needed for Shortness of Breath., Disp: , Rfl:   •  esomeprazole (NEXIUM) 40 MG delayed-release capsule, Take 40 mg by mouth every morning before breakfast., Disp: , Rfl:   •  celecoxib (CELEBREX) 200 MG Cap, Take 200 mg by mouth every day., Disp: , Rfl:   •  duloxetine (CYMBALTA) 30 MG CPEP, Take 30 mg by mouth every day., Disp: , Rfl:   •  clopidogrel (PLAVIX) 75 MG TABS, Take 75 mg by mouth every day., Disp: , Rfl:   •  cyclosporin (RESTASIS) 0.05 % ophthalmic emulsion, Place 1 Drop in both eyes 2 times a day., Disp: , Rfl:   •  nitroglycerin (NITROSTAT) 0.4 MG SUBL, Place 1 Tab under tongue as needed for Chest Pain., Disp: 25 Tab, Rfl: 1  •  AMITIZA 8 MCG PO CAPS, Take 8 mcg by mouth 2 Times a Day., Disp: , Rfl:     ALLERGIES:   Allergies   Allergen Reactions   • Contrast Media With Iodine [Iodine] Itching     Dyspnea, throat closing   • Tequin [Gatifloxacin Sesquihydrate] Hives     Severe rash and SOB     SURGHX:   Past Surgical History:   Procedure Laterality Date   • RECOVERY  6/11/2015    Procedure:  CATH LAB  C WITH POSS. SILVINO ICD; 794.30;  Surgeon: [facility];  [place] [facility] [place]  Service:   "  • KNEE ARTHROSCOPY  10/8/2009    Performed by JAYSHREE VIVEROS at SURGERY [facility] ORS   • MEDIAL MENISCECTOMY  10/8/2009    Performed by JAYSHREE VIVEROS at SURGERY [facility] ORS   • MENISCECTOMY  10/8/2009    Performed by JAYSHREE VIVEROS at SURGERY [facility] ORS   • ALHAJI BY LAPAROSCOPY  4/19/2009    Performed by GANSER, JOHN H at SURGERY [facility] ORS   • ROTATOR CUFF REPAIR  2000    left   • HYSTERECTOMY, TOTAL ABDOMINAL  1973     SOCHX:  reports that she has never smoked. She has never used smokeless tobacco. She reports that she does not drink alcohol and does not use drugs.     FH: Reviewed with patient's daughter, not pertinent to this visit.    Review of Systems   Constitutional: Negative.  Negative for chills, fever and malaise/fatigue.   HENT: Positive for ear discharge, ear pain and tinnitus.    Eyes: Positive for discharge and redness.   Respiratory: Negative.    Cardiovascular: Negative.    All other systems reviewed and are negative.    Additional details per HPI.      Objective:     /60 (BP Location: Left arm, Patient Position: Sitting, BP Cuff Size: Adult)   Pulse 97   Temp 37.5 °C (99.5 °F) (Temporal)   Resp 16   Ht 1.575 m (5' 2\")   Wt 61.2 kg (135 lb)   SpO2 97%   BMI 24.69 kg/m²     Physical Exam  Vitals reviewed.   Constitutional:       General: She is not in acute distress.     Appearance: She is well-developed. She is not ill-appearing or toxic-appearing.   HENT:      Head: Normocephalic.      Right Ear: External ear normal. Tympanic membrane is injected and bulging. Tympanic membrane is not perforated.      Left Ear: Tympanic membrane and external ear normal. Drainage (Abrasion with bleeding at left EAC) present.   Eyes:      General: Lids are normal. Vision grossly intact.         Right eye: No discharge.         Left eye: No discharge.      Extraocular Movements: Extraocular movements intact.      Conjunctiva/sclera: Conjunctivae normal.      Right eye: Right " conjunctiva is not injected. No chemosis or hemorrhage.     Left eye: Left conjunctiva is not injected. No chemosis or hemorrhage.     Pupils: Pupils are equal, round, and reactive to light.   Cardiovascular:      Rate and Rhythm: Normal rate and regular rhythm.      Heart sounds: Normal heart sounds.   Pulmonary:      Effort: Pulmonary effort is normal. No respiratory distress.      Breath sounds: Normal breath sounds. No decreased breath sounds.   Musculoskeletal:         General: No deformity. Normal range of motion.      Cervical back: Normal range of motion.   Skin:     General: Skin is warm and dry.      Coloration: Skin is not pale.   Neurological:      Mental Status: She is alert and oriented to person, place, and time.      Sensory: No sensory deficit.      Motor: No weakness.      Coordination: Coordination normal.   Psychiatric:         Behavior: Behavior normal. Behavior is cooperative.       Assessment/Plan:     1. Bleeding from left ear  - REFERRAL TO ENT    2. Tinnitus, unspecified laterality  - REFERRAL TO ENT    3. Chronic ear pain, unspecified laterality  - REFERRAL TO ENT    4. Right otitis media, unspecified otitis media type  - amoxicillin (AMOXIL) 875 MG tablet; Take 1 tablet by mouth 2 times a day for 7 days.  Dispense: 14 tablet; Refill: 0    5. Conjunctivitis, unspecified conjunctivitis type, unspecified laterality  - polymixin-trimethoprim (POLYTRIM) 08406-3.1 UNIT/ML-% Solution; Administer 1 Drop into both eyes every 4 hours for 7 days.  Dispense: 10 mL; Refill: 0    6. Chronic pain of both shoulders    Differential diagnosis, natural history, supportive care, over-the-counter symptom management per 's instructions, close monitoring, and indications for immediate follow-up discussed.    Advised to avoid placing objects in or picking ears.    Chronic ear pain, uncontrolled. Patient should see ENT. Referral placed.    Rx as above sent electronically.    No signs/symptoms of acute  conjunctivitis at this time, but will send Rx for Polytrim for contingent use.    Chronic shoulder pain, likely OA. Discussed OTC ibuprofen, rest as needed.    Patient's daughter advised to: Return for 1) Symptoms that worsen/don't improve, or go to ER, 2) Follow up with primary care in 7-10 days.    All questions answered. Patient's daughter agrees with the plan of care.    Discharge summary provided.

## 2021-09-24 ENCOUNTER — HOSPITAL ENCOUNTER (OUTPATIENT)
Facility: MEDICAL CENTER | Age: 86
End: 2021-09-24
Attending: PHYSICIAN ASSISTANT
Payer: MEDICARE

## 2021-09-24 ENCOUNTER — OFFICE VISIT (OUTPATIENT)
Dept: URGENT CARE | Facility: CLINIC | Age: 86
End: 2021-09-24
Payer: MEDICARE

## 2021-09-24 VITALS
HEIGHT: 61 IN | WEIGHT: 139.8 LBS | OXYGEN SATURATION: 98 % | HEART RATE: 79 BPM | DIASTOLIC BLOOD PRESSURE: 60 MMHG | RESPIRATION RATE: 16 BRPM | BODY MASS INDEX: 26.39 KG/M2 | TEMPERATURE: 97.2 F | SYSTOLIC BLOOD PRESSURE: 110 MMHG

## 2021-09-24 DIAGNOSIS — Z11.52 ENCOUNTER FOR SCREENING FOR COVID-19: ICD-10-CM

## 2021-09-24 PROCEDURE — U0003 INFECTIOUS AGENT DETECTION BY NUCLEIC ACID (DNA OR RNA); SEVERE ACUTE RESPIRATORY SYNDROME CORONAVIRUS 2 (SARS-COV-2) (CORONAVIRUS DISEASE [COVID-19]), AMPLIFIED PROBE TECHNIQUE, MAKING USE OF HIGH THROUGHPUT TECHNOLOGIES AS DESCRIBED BY CMS-2020-01-R: HCPCS

## 2021-09-24 PROCEDURE — U0003 INFECTIOUS AGENT DETECTION BY NUCLEIC ACID (DNA OR RNA); SEVERE ACUTE RESPIRATORY SYNDROME CORONAVIRUS 2 (SARS-COV-2) (CORONAVIRUS DISEASE [COVID-19]), AMPLIFIED PROBE TECHNIQUE, MAKING USE OF HIGH THROUGHPUT TECHNOLOGIES AS DESCRIBED BY CMS-2020-01-R: HCPCS | Mod: 91

## 2021-09-24 PROCEDURE — U0005 INFEC AGEN DETEC AMPLI PROBE: HCPCS

## 2021-09-24 PROCEDURE — 99213 OFFICE O/P EST LOW 20 MIN: CPT | Mod: CS | Performed by: PHYSICIAN ASSISTANT

## 2021-09-24 ASSESSMENT — ENCOUNTER SYMPTOMS
PALPITATIONS: 0
WHEEZING: 0
FEVER: 0
CHILLS: 0
HEADACHES: 0
SINUS PAIN: 0
MYALGIAS: 0
ABDOMINAL PAIN: 0
DIARRHEA: 0
SHORTNESS OF BREATH: 0
DIZZINESS: 0
COUGH: 0
NAUSEA: 0
VOMITING: 0
SORE THROAT: 0

## 2021-09-24 NOTE — PROGRESS NOTES
Subjective:   Navya Mcbride is a 86 y.o. female who presents for Coronavirus Screening (flying out monday )      HPI:  This is a very pleasant 86-year-old female presenting to the clinic for coronavirus screening.  Patient is scheduled to fly out of the country on Monday.  [employer] are requesting she has tested 72 hours before takeoff.  Needs a PCR test.  Has not been vaccinated for COVID-19.  Not currently experiencing any symptoms.    Review of Systems   Constitutional: Negative for chills, fever and malaise/fatigue.   HENT: Negative for congestion, sinus pain and sore throat.    Respiratory: Negative for cough, shortness of breath and wheezing.    Cardiovascular: Negative for chest pain and palpitations.   Gastrointestinal: Negative for abdominal pain, diarrhea, nausea and vomiting.   Musculoskeletal: Negative for myalgias.   Skin: Negative for rash.   Neurological: Negative for dizziness and headaches.       Medications:    • albuterol Aers  • Amitiza Caps  • aspirin EC Tbec  • budesonide-formoterol Aero  • celecoxib Caps  • clopidogrel Tabs  • cyclobenzaprine Tabs  • cyclosporin  • DULoxetine Cpep  • esomeprazole  • LORazepam  • metoprolol tartrate Tabs  • nitroglycerin Subl  • OXYCODONE-ACETAMINOPHEN PO  • tizanidine Tabs    Allergies: Contrast media with iodine [iodine] and Tequin [gatifloxacin sesquihydrate]    Problem List: Navya Mcbride does not have any pertinent problems on file.    Surgical History:  Past Surgical History:   Procedure Laterality Date   • RECOVERY  6/11/2015    Procedure:  CATH LAB  [facility] WITH POSS. ROONGSRCONNORG ICD; 794.30;  Surgeon: [facility];  Location: SURGERY PRE-POST PROC UNIT [facility];  Service:    • KNEE ARTHROSCOPY  10/8/2009    Performed by JAYSHREE VIVEROS at [facility] ORS   • MEDIAL MENISCECTOMY  10/8/2009    Performed by JAYSHREE VIVEROS at [facility] ORS   • MENISCECTOMY  10/8/2009    Performed by JAYSHREE VIVEROS at [facility]S   • ALHAJI BY LAPAROSCOPY  4/19/2009    Performed by GANSER, JOHN H at SURGERY [facility] ORS   • ROTATOR CUFF REPAIR  2000    left   • HYSTERECTOMY, TOTAL ABDOMINAL  1973       Past Social Hx: Navya Mcbride  reports that she has never smoked. She has never used smokeless tobacco. She reports that she does not drink alcohol and does not use drugs.     Past Family Hx:  Navya Mcbride family history includes Anesthesia in her father; Cancer in an other family member; Diabetes in an other family member; Heart Disease in an other family member; Hypertension in an other family member; Stroke in an other family member.     Problem list, medications, and allergies reviewed by myself today in Epic.     Objective:     /60 (BP Location: Left arm, Patient Position: Sitting, BP Cuff Size: Adult long)   Pulse 79   Temp 36.2 °C (97.2 °F) (Temporal)   Resp 16   Ht 1.549 m (5' 1\")   Wt 63.4 kg (139 lb 12.8 oz)   SpO2 98%   BMI 26.41 kg/m²     Physical Exam  Constitutional:       General: She is not in acute distress.     Appearance: Normal appearance. She is not ill-appearing, toxic-appearing or diaphoretic.   HENT:      Head: Normocephalic and atraumatic.      Mouth/Throat:      Mouth: Mucous membranes are moist.      Pharynx: No oropharyngeal exudate or posterior oropharyngeal erythema.   Eyes:      Conjunctiva/sclera: Conjunctivae normal.   Cardiovascular:      Rate and Rhythm: Normal rate and regular rhythm.      Pulses: Normal pulses.      Heart sounds: Normal heart sounds.   Pulmonary:      Effort: Pulmonary effort is normal.      Breath sounds: Normal breath sounds. No wheezing.   Musculoskeletal:      Cervical back: Normal range of motion. No muscular tenderness.   Lymphadenopathy:      Cervical: No cervical adenopathy.   Skin:     General: Skin is warm and dry.      Capillary Refill: Capillary refill takes less than 2 seconds.   Neurological:      Mental Status: She is alert. "   Psychiatric:         Mood and Affect: Mood normal.         Thought Content: Thought content normal.           Assessment/Plan:     Comments/MDM:     Patient's vital signs are reassuring and they appear hemodynamically stable and do not require higher level care at this time  I discussed self isolation and provided printed instructions. Stay isolated until results are made available. May take 2-3 days.  I provided the patient the printed AVS which contains information about signing up for [employer]   I will contact the patient via [employer] with Covid results.  Red flag symptoms were discussed with the patient at length today.  If any of these symptoms present return to the clinic or nearest emergency department.    Please call with any questions or concerns.     Diagnosis and associated orders:     1. Encounter for screening for COVID-19  COVID/SARS CoV-2 PCR              Differential diagnosis, natural history, supportive care, and indications for immediate follow-up discussed.    Advised the patient to follow-up with the primary care physician for recheck, reevaluation, and consideration of further management.    Please note that this dictation was created using voice recognition software. I have made reasonable attempt to correct obvious errors, but I expect that there are errors of grammar and possibly content that I did not discover before finalizing the note.    This note was electronically signed by KEVIN Rachel PA-C

## 2021-09-25 ENCOUNTER — OFFICE VISIT (OUTPATIENT)
Dept: URGENT CARE | Facility: CLINIC | Age: 86
End: 2021-09-25
Payer: MEDICARE

## 2021-09-25 VITALS
HEART RATE: 91 BPM | TEMPERATURE: 98.7 F | HEIGHT: 61 IN | RESPIRATION RATE: 22 BRPM | OXYGEN SATURATION: 93 % | BODY MASS INDEX: 26.7 KG/M2 | DIASTOLIC BLOOD PRESSURE: 60 MMHG | WEIGHT: 141.4 LBS | SYSTOLIC BLOOD PRESSURE: 128 MMHG

## 2021-09-25 DIAGNOSIS — Z11.52 ENCOUNTER FOR SCREENING FOR COVID-19: ICD-10-CM

## 2021-09-25 LAB — COVID ORDER STATUS COVID19: NORMAL

## 2021-09-25 PROCEDURE — 99213 OFFICE O/P EST LOW 20 MIN: CPT | Mod: CS | Performed by: PHYSICIAN ASSISTANT

## 2021-09-25 NOTE — PROGRESS NOTES
Chief Complaint   Patient presents with   • Coronavirus Screening     pt wants a covid test        HISTORY OF PRESENT ILLNESS: Patient is a 86 y.o. female who presents today because Covid screening.  Patient needs negative Covid test within 72 hours of her flight on Monday.  She was tested yesterday however they just received a letter from the airline stating the test needed to be completed today in order to fit within the window.  She remains asymptomatic with no acute concerns today.    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   • Hypoxia 05/05/2020   • Rash 05/21/2019   • Decreased oral intake 05/21/2019   • Cellulitis of left lower extremity 08/17/2017   • Red man syndrome 08/17/2017   • Abscess 08/17/2017   • Fibromyalgia 06/03/2015   • GERD (gastroesophageal reflux disease) 06/03/2015   • Glaucoma 06/03/2015   • Allergy to iodine 06/03/2015   • H/O arthroscopic knee surgery    • Pulmonary embolism (HCC)    • Colitis    • Abnormal myocardial perfusion study 05/07/2015   • H/O: stroke 05/07/2015   • Memory loss 05/07/2015       Allergies:Contrast media with iodine [iodine] and Tequin [gatifloxacin sesquihydrate]    Current Outpatient Medications Ordered in Epic   Medication Sig Dispense Refill   • tizanidine (ZANAFLEX) 4 MG Tab      • budesonide-formoterol (SYMBICORT) 80-4.5 MCG/ACT Aerosol Inhale 2 Puffs by mouth 2 Times a Day.     • OXYCODONE-ACETAMINOPHEN PO Take 5 mg by mouth.     • metoprolol (LOPRESSOR) 25 MG Tab Take 0.5 Tabs by mouth 2 times a day. 30 Tab 0   • aspirin EC (ECOTRIN) 81 MG Tablet Delayed Response Take 81 mg by mouth every day.     • LORazepam (ATIVAN) 2 MG tablet Take 0.5 mg by mouth 2 times a day.     • cyclobenzaprine (FLEXERIL) 10 MG Tab Take 10 mg by mouth 3 times a day as needed for Muscle Spasms.     • albuterol 108 (90 Base) MCG/ACT Aero Soln inhalation aerosol Inhale 2 Puffs by mouth every 6 hours as needed for Shortness of Breath.    "  • esomeprazole (NEXIUM) 40 MG delayed-release capsule Take 40 mg by mouth every morning before breakfast.     • celecoxib (CELEBREX) 200 MG Cap Take 200 mg by mouth every day.     • duloxetine (CYMBALTA) 30 MG CPEP Take 30 mg by mouth every day.     • clopidogrel (PLAVIX) 75 MG TABS Take 75 mg by mouth every day.     • cyclosporin (RESTASIS) 0.05 % ophthalmic emulsion Place 1 Drop in both eyes 2 times a day.     • nitroglycerin (NITROSTAT) 0.4 MG SUBL Place 1 Tab under tongue as needed for Chest Pain. 25 Tab 1   • AMITIZA 8 MCG PO CAPS Take 8 mcg by mouth 2 Times a Day.       No current [facility]-ordered facility-administered medications on file.       Past Medical History:   Diagnosis Date   • Anemia     chronic   • Anesthesia     \"hard to wake up\"   • Arthritis     hands, states RA and Osteo   • Asthma     uses inhaler   • Backpain     compressed discs   • Breath shortness     oxygen 2L N/C prn   • CATARACT    • Clostridium difficile infection    • Coarse tremors    • Colitis    • Dental disorder     chronic gum infection   • Fibromyalgia    • Gastritis    • Heart burn    • Hiatus hernia syndrome    • Indigestion    • Myocardial infarct (HCC) 2015    silent MI   • Osteoporosis    • Other specified disorder of intestines     colitis, bloating, constipation   • Pain     fibromyalgia   • Pneumonia     2011   • Psychiatric problem     depression   • Pulmonary embolism (HCC)     left lung   • Stroke (HCC)     right weakness   • Urinary bladder disorder     hematuria chronic       Social History     Tobacco Use   • Smoking status: Never Smoker   • Smokeless tobacco: Never Used   Vaping Use   • Vaping Use: Never used   Substance Use Topics   • Alcohol use: No   • Drug use: No       Family Status   Relation Name Status   • Fa   at age 70        diabetes   • Mo   at age 83        CA uterine   • Bro   at age 50        cirrhosis   • OTHER  (Not Specified)   • OTHER  (Not Specified)   • " OTHER  (Not Specified)   • OTHER  (Not Specified)   • OTHER  (Not Specified)     Family History   Problem Relation Age of Onset   • Anesthesia Father         difficulty waking post anesthesia   • Diabetes Other    • Heart Disease Other    • Hypertension Other    • Stroke Other    • Cancer Other        ROS:  Review of Systems   Constitutional: Negative for fever, chills, weight loss and malaise/fatigue.   HENT: Negative for ear pain, nosebleeds, congestion, sore throat and neck pain.    Respiratory: Negative for cough, sputum production, shortness of breath and wheezing.    Cardiovascular: Negative for chest pain, palpitations, orthopnea and leg swelling.       Vitals:    09/25/21 1228   BP: 128/60   Pulse: 91   Resp: (!) 22   Temp: 37.1 °C (98.7 °F)   SpO2: 93%       Physical Exam  Vitals and nursing note reviewed.   Constitutional:       General: She is not in acute distress.     Appearance: Normal appearance. She is well-developed. She is not ill-appearing or toxic-appearing.   HENT:      Head: Normocephalic and atraumatic.      Right Ear: External ear normal.      Left Ear: External ear normal.      Nose: Nose normal.   Eyes:      Conjunctiva/sclera: Conjunctivae normal.   Pulmonary:      Effort: Pulmonary effort is normal.   Neurological:      Mental Status: She is alert and oriented to person, place, and time.   Psychiatric:         Mood and Affect: Mood normal.         Behavior: Behavior normal.         Thought Content: Thought content normal.         Judgment: Judgment normal.           Assessment/Plan:  1. Encounter for screening for COVID-19  SARS-CoV-2, PCR (In-House): Collect NP OR nasal swab in VTM     Screening exam.  Patient remains asymptomatic with no concerns today.    Return to clinic or go to ED if symptoms worsen or persist. Indications for ED discussed at length. Patient/Parent/Guardian voices understanding. Follow-up with your primary care provider in 3-5 days. Red flag symptoms discussed. All  side effects of medication discussed including allergic response, GI upset, tendon injury, rash, sedation etc.      Please note that this dictation was created using voice recognition software. I have made every reasonable attempt to correct obvious errors, but I expect that there are errors of grammar and possibly content that I did not discover before finalizing the note.

## 2021-09-26 LAB
COVID ORDER STATUS COVID19: NORMAL
SARS-COV-2 RNA RESP QL NAA+PROBE: NOTDETECTED
SPECIMEN SOURCE: NORMAL

## 2021-09-27 LAB
SARS-COV-2 RNA RESP QL NAA+PROBE: NOTDETECTED
SPECIMEN SOURCE: NORMAL

## 2022-02-14 ENCOUNTER — HOSPITAL ENCOUNTER (OUTPATIENT)
Facility: MEDICAL CENTER | Age: 87
End: 2022-02-14
Attending: NURSE PRACTITIONER
Payer: MEDICARE

## 2022-02-14 ENCOUNTER — OFFICE VISIT (OUTPATIENT)
Dept: URGENT CARE | Facility: CLINIC | Age: 87
End: 2022-02-14
Payer: MEDICARE

## 2022-02-14 VITALS
HEIGHT: 62 IN | BODY MASS INDEX: 25.58 KG/M2 | RESPIRATION RATE: 16 BRPM | WEIGHT: 139 LBS | SYSTOLIC BLOOD PRESSURE: 120 MMHG | HEART RATE: 81 BPM | DIASTOLIC BLOOD PRESSURE: 80 MMHG | OXYGEN SATURATION: 94 % | TEMPERATURE: 97.7 F

## 2022-02-14 DIAGNOSIS — N30.01 ACUTE CYSTITIS WITH HEMATURIA: ICD-10-CM

## 2022-02-14 DIAGNOSIS — R42 DIZZINESS: ICD-10-CM

## 2022-02-14 LAB
APPEARANCE UR: NORMAL
BILIRUB UR STRIP-MCNC: NEGATIVE MG/DL
COLOR UR AUTO: NORMAL
GLUCOSE UR STRIP.AUTO-MCNC: NEGATIVE MG/DL
KETONES UR STRIP.AUTO-MCNC: 15 MG/DL
LEUKOCYTE ESTERASE UR QL STRIP.AUTO: NEGATIVE
NITRITE UR QL STRIP.AUTO: NEGATIVE
PH UR STRIP.AUTO: 5.5 [PH] (ref 5–8)
PROT UR QL STRIP: NEGATIVE MG/DL
RBC UR QL AUTO: NORMAL
SP GR UR STRIP.AUTO: 1.03
UROBILINOGEN UR STRIP-MCNC: 0.2 MG/DL

## 2022-02-14 PROCEDURE — 99214 OFFICE O/P EST MOD 30 MIN: CPT | Performed by: NURSE PRACTITIONER

## 2022-02-14 PROCEDURE — 81002 URINALYSIS NONAUTO W/O SCOPE: CPT | Performed by: NURSE PRACTITIONER

## 2022-02-14 PROCEDURE — U0005 INFEC AGEN DETEC AMPLI PROBE: HCPCS

## 2022-02-14 PROCEDURE — U0003 INFECTIOUS AGENT DETECTION BY NUCLEIC ACID (DNA OR RNA); SEVERE ACUTE RESPIRATORY SYNDROME CORONAVIRUS 2 (SARS-COV-2) (CORONAVIRUS DISEASE [COVID-19]), AMPLIFIED PROBE TECHNIQUE, MAKING USE OF HIGH THROUGHPUT TECHNOLOGIES AS DESCRIBED BY CMS-2020-01-R: HCPCS

## 2022-02-14 PROCEDURE — 87086 URINE CULTURE/COLONY COUNT: CPT

## 2022-02-14 ASSESSMENT — ENCOUNTER SYMPTOMS
CHILLS: 0
DIZZINESS: 1
SHORTNESS OF BREATH: 0
EYE PAIN: 0
MYALGIAS: 0
SORE THROAT: 0
NAUSEA: 0
FLANK PAIN: 0
VOMITING: 0
FEVER: 0

## 2022-02-14 NOTE — PROGRESS NOTES
Subjective:   Navya Mcbride is a 86 y.o. female who presents for UTI (burning during urination, mucus in bowel movement, hard to void x month ) and Dizziness (headache, wheezing, lung pain/upper pain, (R) eye, ear infection, slight sore throat pain x month )      UTI  This is a new problem. The current episode started more than 1 month ago. The problem occurs constantly. The problem has been unchanged. Pertinent negatives include no chest pain, chills, fever, myalgias, nausea, rash, sore throat or vomiting. Nothing aggravates the symptoms. She has tried nothing for the symptoms. The treatment provided no relief.       Review of Systems   Constitutional: Negative for chills and fever.   HENT: Negative for sore throat.    Eyes: Negative for pain.   Respiratory: Negative for shortness of breath.    Cardiovascular: Negative for chest pain.   Gastrointestinal: Negative for nausea and vomiting.   Genitourinary: Positive for dysuria, frequency and urgency. Negative for flank pain and hematuria.   Musculoskeletal: Negative for myalgias.   Skin: Negative for rash.   Neurological: Positive for dizziness.       Medications:    • albuterol Aers  • Amitiza Caps  • aspirin EC Tbec  • budesonide-formoterol Aero  • celecoxib Caps  • cyclosporin  • DULoxetine Cpep  • esomeprazole  • metoprolol tartrate Tabs    Allergies: Contrast media with iodine [iodine] and Tequin [gatifloxacin sesquihydrate]    Problem List: Navya Mcbride does not have any pertinent problems on file.    Surgical History:  Past Surgical History:   Procedure Laterality Date   • RECOVERY  6/11/2015    Procedure:  CATH LAB  [facility] WITH POSS. ANGESRFREDDY ICD; 794.30;  Surgeon: Recoveryonly Surgery;  [place] [facility]  Service:    • KNEE ARTHROSCOPY  10/8/2009    Performed by JAYSHREE VIVEROS at SURGERY [facility]   • MENISCECTOMY, KNEE, MEDIAL  10/8/2009    Performed by JAYSHREE VIVEROS at [facility]   •  "MENISCECTOMY  10/8/2009    Performed by JAYSHREE VIVEROS at SURGERY [facility] ORS   • ALHAJI BY LAPAROSCOPY  4/19/2009    Performed by GANSER, JOHN H at SURGERY [facility] ORS   • ROTATOR CUFF REPAIR  2000    left   • HYSTERECTOMY, TOTAL ABDOMINAL  1973       Past Social Hx: Navya Mcbride  reports that she has never smoked. She has never used smokeless tobacco. She reports that she does not drink alcohol and does not use drugs.     Past Family Hx:  Navya Mcbride family history includes Anesthesia in her father; Cancer in an other family member; Diabetes in an other family member; Heart Disease in an other family member; Hypertension in an other family member; Stroke in an other family member.     Problem list, medications, and allergies reviewed by myself today in Epic.     Objective:     /80   Pulse 81   Temp 36.5 °C (97.7 °F)   Resp 16   Ht 1.575 m (5' 2\")   Wt 63 kg (139 lb)   SpO2 94%   BMI 25.42 kg/m²     Physical Exam  Vitals and nursing note reviewed.   Constitutional:       General: She is not in acute distress.     Appearance: She is well-developed.   HENT:      Head: Normocephalic and atraumatic.      Right Ear: Tympanic membrane and external ear normal.      Left Ear: Tympanic membrane and external ear normal.      Nose: Nose normal.      Right Sinus: No maxillary sinus tenderness or frontal sinus tenderness.      Left Sinus: No maxillary sinus tenderness or frontal sinus tenderness.      Mouth/Throat:      Mouth: Mucous membranes are moist.      Pharynx: Uvula midline. No posterior oropharyngeal erythema.      Tonsils: No tonsillar exudate or tonsillar abscesses.   Eyes:      General:         Right eye: No discharge.         Left eye: No discharge.      Conjunctiva/sclera: Conjunctivae normal.   Cardiovascular:      Rate and Rhythm: Normal rate.   Pulmonary:      Effort: Pulmonary effort is normal. No respiratory distress.      Breath sounds: Normal breath sounds. "   Abdominal:      General: There is no distension.   Musculoskeletal:         General: Normal range of motion.   Skin:     General: Skin is warm and dry.   Neurological:      General: No focal deficit present.      Mental Status: She is alert and oriented to person, place, and time. Mental status is at baseline.      Gait: Gait (gait at baseline) normal.   Psychiatric:         Judgment: Judgment normal.         Assessment/Plan:     Diagnosis and associated orders:     1. Acute cystitis with hematuria  POCT Urinalysis    URINE CULTURE(NEW)    SARS-CoV-2 PCR (24 hour In-House): Collect NP swab in VTM    cefdinir (OMNICEF) 300 MG Cap   2. Dizziness          Comments/MDM:     Results for orders placed or performed in visit on 02/14/22   POCT Urinalysis   Result Value Ref Range    POC Color Samina Negative    POC Appearance Turbid Negative    POC Leukocyte Esterase Negative Negative    POC Nitrites Negative Negative    POC Urobiligen 0.2 Negative (0.2) mg/dL    POC Protein Negative Negative mg/dL    POC Urine PH 5.5 5.0 - 8.0    POC Blood Trace-intact Negative    POC Specific Gravity 1.030 <1.005 - >1.030    POC Ketones 15 Negative mg/dL    POC Bilirubin Negative Negative mg/dL    POC Glucose Negative Negative mg/dL •     Pt. Was given ABX therapy today and will change therapy if culture indicates this is necessary. ER precautions given- worsening symptoms, back pain, abd. Pain, or fevers.   Pt. Is to increase fluids, and take the complete duration of the therapy.   Test for COVID-19 via PCR. Result will be reviewed by myself. We will call/message back for results and appropriate further instructions. Instructed to sign up for [employer] if they have not already. Result will be automatically released to [employer] application for patient review. I will be sending a message with Next Step Instructions to [employer] soon after resulted.   Symptomatic and supportive care:   Plenty of oral hydration and rest   Over the counter cough  suppressant as directed.  Tylenol or ibuprofen for pain and fever as directed.   Warm salt water gargles for sore throat, soft foods, cool liquids.   Saline nasal spray and Flonase as a decongestant.   Infection control measures at home. Stay away from people, Hand washing, covering sneeze/cough, disinfect surfaces.   Remain home from work, school, and other populated environments. Work note provided with information of quarantine measures per CDC guidelines.   Overall, the patient is well-appearing. They are not hypoxic, afebrile, and a normal pulmonary exam.      •   •    •      My total time spent caring for the patient on the day of the encounter was 35 minutes.   This does not include time spent on separately billable procedures/tests.          Please note that this dictation was created using voice recognition software. I have made a reasonable attempt to correct obvious errors, but I expect that there are errors of grammar and possibly content that I did not discover before finalizing the note.    This note was electronically signed by Carlos BLAS.

## 2022-02-15 DIAGNOSIS — N30.01 ACUTE CYSTITIS WITH HEMATURIA: ICD-10-CM

## 2022-02-16 ENCOUNTER — TELEPHONE (OUTPATIENT)
Dept: URGENT CARE | Facility: CLINIC | Age: 87
End: 2022-02-16
Payer: MEDICARE

## 2022-02-16 DIAGNOSIS — N30.00 ACUTE CYSTITIS WITHOUT HEMATURIA: ICD-10-CM

## 2022-02-16 RX ORDER — CEFDINIR 300 MG/1
300 CAPSULE ORAL 2 TIMES DAILY
Qty: 14 CAPSULE | Refills: 0 | Status: SHIPPED
Start: 2022-02-16 | End: 2022-02-16

## 2022-02-16 RX ORDER — CEFDINIR 300 MG/1
300 CAPSULE ORAL 2 TIMES DAILY
Qty: 14 CAPSULE | Refills: 0 | Status: SHIPPED | OUTPATIENT
Start: 2022-02-16 | End: 2022-02-23

## 2022-02-16 NOTE — TELEPHONE ENCOUNTER
PT daughter Love BUSTAMANTE Called to ask about mother RX. Daughter is not listed as discussing treatments. Pt daughter that is on list of discussing treatments is the contact to return call to, Malinda. Pt was told she had a severe UTI and no antibx has been sent. Informed pt would follow up with provider and pt wants RX to be sent to [facility]. Malinda needs to be informed of pt covid results.      0246pm called Malinda, informed of Neg covid results and that urine culture is still pending. Malinda requested antibx change to [facility] [place]

## 2022-02-17 LAB
BACTERIA UR CULT: NORMAL
SIGNIFICANT IND 70042: NORMAL
SITE SITE: NORMAL
SOURCE SOURCE: NORMAL

## 2022-07-14 ENCOUNTER — APPOINTMENT (OUTPATIENT)
Dept: RADIOLOGY | Facility: MEDICAL CENTER | Age: 87
End: 2022-07-14
Attending: EMERGENCY MEDICINE
Payer: MEDICARE

## 2022-07-14 ENCOUNTER — HOSPITAL ENCOUNTER (EMERGENCY)
Facility: MEDICAL CENTER | Age: 87
End: 2022-07-14
Attending: EMERGENCY MEDICINE
Payer: MEDICARE

## 2022-07-14 VITALS
TEMPERATURE: 98.1 F | RESPIRATION RATE: 24 BRPM | OXYGEN SATURATION: 92 % | SYSTOLIC BLOOD PRESSURE: 118 MMHG | WEIGHT: 132.72 LBS | DIASTOLIC BLOOD PRESSURE: 62 MMHG | BODY MASS INDEX: 24.42 KG/M2 | HEART RATE: 75 BPM | HEIGHT: 62 IN

## 2022-07-14 DIAGNOSIS — R07.89 MUSCULOSKELETAL CHEST PAIN: ICD-10-CM

## 2022-07-14 LAB
ALBUMIN SERPL BCP-MCNC: 3.6 G/DL (ref 3.2–4.9)
ALBUMIN/GLOB SERPL: 1.3 G/DL
ALP SERPL-CCNC: 75 U/L (ref 30–99)
ALT SERPL-CCNC: 28 U/L (ref 2–50)
ANION GAP SERPL CALC-SCNC: 9 MMOL/L (ref 7–16)
AST SERPL-CCNC: 29 U/L (ref 12–45)
BASOPHILS # BLD AUTO: 0.7 % (ref 0–1.8)
BASOPHILS # BLD: 0.03 K/UL (ref 0–0.12)
BILIRUB SERPL-MCNC: <0.2 MG/DL (ref 0.1–1.5)
BUN SERPL-MCNC: 28 MG/DL (ref 8–22)
CALCIUM SERPL-MCNC: 9.2 MG/DL (ref 8.4–10.2)
CHLORIDE SERPL-SCNC: 107 MMOL/L (ref 96–112)
CO2 SERPL-SCNC: 24 MMOL/L (ref 20–33)
CREAT SERPL-MCNC: 0.69 MG/DL (ref 0.5–1.4)
D DIMER PPP IA.FEU-MCNC: 0.78 UG/ML (FEU) (ref 0–0.5)
EKG IMPRESSION: NORMAL
EOSINOPHIL # BLD AUTO: 0.1 K/UL (ref 0–0.51)
EOSINOPHIL NFR BLD: 2.3 % (ref 0–6.9)
ERYTHROCYTE [DISTWIDTH] IN BLOOD BY AUTOMATED COUNT: 49.1 FL (ref 35.9–50)
GFR SERPLBLD CREATININE-BSD FMLA CKD-EPI: 84 ML/MIN/1.73 M 2
GLOBULIN SER CALC-MCNC: 2.7 G/DL (ref 1.9–3.5)
GLUCOSE SERPL-MCNC: 91 MG/DL (ref 65–99)
HCT VFR BLD AUTO: 37 % (ref 37–47)
HGB BLD-MCNC: 12 G/DL (ref 12–16)
IMM GRANULOCYTES # BLD AUTO: 0.02 K/UL (ref 0–0.11)
IMM GRANULOCYTES NFR BLD AUTO: 0.5 % (ref 0–0.9)
LYMPHOCYTES # BLD AUTO: 1.15 K/UL (ref 1–4.8)
LYMPHOCYTES NFR BLD: 26.9 % (ref 22–41)
MCH RBC QN AUTO: 29.8 PG (ref 27–33)
MCHC RBC AUTO-ENTMCNC: 32.4 G/DL (ref 33.6–35)
MCV RBC AUTO: 91.8 FL (ref 81.4–97.8)
MONOCYTES # BLD AUTO: 0.43 K/UL (ref 0–0.85)
MONOCYTES NFR BLD AUTO: 10 % (ref 0–13.4)
NEUTROPHILS # BLD AUTO: 2.55 K/UL (ref 2–7.15)
NEUTROPHILS NFR BLD: 59.6 % (ref 44–72)
NRBC # BLD AUTO: 0 K/UL
NRBC BLD-RTO: 0 /100 WBC
PLATELET # BLD AUTO: 252 K/UL (ref 164–446)
PMV BLD AUTO: 9.8 FL (ref 9–12.9)
POTASSIUM SERPL-SCNC: 4.3 MMOL/L (ref 3.6–5.5)
PROT SERPL-MCNC: 6.3 G/DL (ref 6–8.2)
RBC # BLD AUTO: 4.03 M/UL (ref 4.2–5.4)
SODIUM SERPL-SCNC: 140 MMOL/L (ref 135–145)
TROPONIN T SERPL-MCNC: 9 NG/L (ref 6–19)
WBC # BLD AUTO: 4.3 K/UL (ref 4.8–10.8)

## 2022-07-14 PROCEDURE — 93005 ELECTROCARDIOGRAM TRACING: CPT | Performed by: EMERGENCY MEDICINE

## 2022-07-14 PROCEDURE — 84484 ASSAY OF TROPONIN QUANT: CPT

## 2022-07-14 PROCEDURE — 85379 FIBRIN DEGRADATION QUANT: CPT

## 2022-07-14 PROCEDURE — 36415 COLL VENOUS BLD VENIPUNCTURE: CPT

## 2022-07-14 PROCEDURE — 99284 EMERGENCY DEPT VISIT MOD MDM: CPT

## 2022-07-14 PROCEDURE — 85025 COMPLETE CBC W/AUTO DIFF WBC: CPT

## 2022-07-14 PROCEDURE — 93005 ELECTROCARDIOGRAM TRACING: CPT

## 2022-07-14 PROCEDURE — 70450 CT HEAD/BRAIN W/O DYE: CPT | Mod: ME

## 2022-07-14 PROCEDURE — 80053 COMPREHEN METABOLIC PANEL: CPT

## 2022-07-14 PROCEDURE — 71045 X-RAY EXAM CHEST 1 VIEW: CPT

## 2022-07-14 PROCEDURE — 73030 X-RAY EXAM OF SHOULDER: CPT | Mod: LT

## 2022-07-14 NOTE — ED PROVIDER NOTES
"ED Provider Note    CHIEF COMPLAINT  Chief Complaint   Patient presents with   • Chest Pain     Daughter states yesterday she started having CP and her left shoulder has been hurting for the past month. Pt states she is more concerned about her shoulder and is not telling this RN anything regarding her CP. Daughter states pt fell out of bed and landed on her left shoulder. States she is having a hard time lifting it. Per daughter, pt family says pt c/o of dizziness, ALOC, more confused, no appitete.       HPI  Navya Mcbride is a 87 y.o. female who presents with left shoulder and left-sided chest pain.  Patient has a history of dementia and fibromyalgia.  Reportedly her dementia seems to be getting worse fairly recently.  Patient had a fall a month ago.  Family does not think that she hit her head.  She rolled out of the bed onto the left side of her shoulder.  She had chest pain and left shoulder pain since that time.  Pain is located over the left anterior chest wall.  She denies shortness of breath.  This pain seems to be present most of the time without particular modifying factors aside from worse with movement.  She has not had fever, cough, pleuritic pain.  She told the triage nurse that her left leg hurt.  She denies any lower extremity pain to me.  No abdominal pain, nausea vomiting diarrhea but has not had much of an appetite lately.    REVIEW OF SYSTEMS  As per HPI, otherwise a 10 point review of systems is negative    PAST MEDICAL HISTORY  Past Medical History:   Diagnosis Date   • Anemia     chronic   • Anesthesia     \"hard to wake up\"   • Arthritis     hands, states RA and Osteo   • Asthma     uses inhaler   • Backpain     compressed discs   • Breath shortness     oxygen 2L N/C prn   • CATARACT    • Clostridium difficile infection 2009   • Coarse tremors    • Colitis    • Dental disorder     chronic gum infection   • Fibromyalgia    • Gastritis    • Heart burn    • Hiatus hernia syndrome    • " Indigestion    • Myocardial infarct (HCC) 5/2015    silent MI   • Osteoporosis    • Other specified disorder of intestines     colitis, bloating, constipation   • Pain     fibromyalgia   • Pneumonia     4/4/2011   • Psychiatric problem     depression   • Pulmonary embolism (HCC)     left lung   • Stroke (HCC) 2009    right weakness   • Urinary bladder disorder     hematuria chronic       SOCIAL HISTORY  Social History     Tobacco Use   • Smoking status: Never Smoker   • Smokeless tobacco: Never Used   Vaping Use   • Vaping Use: Never used   Substance Use Topics   • Alcohol use: No   • Drug use: No       SURGICAL HISTORY  Past Surgical History:   Procedure Laterality Date   • RECOVERY  6/11/2015    Procedure:  CATH LAB  [facility] WITH KATHERINE DUBOIS ICD; 794.30;  Surgeon: Recoveryondenise Surgery;  Location: SURG[place] PRE-POST PROC [place]IT [facility] [place];  Service:    • KNEE ARTHROSCOPY  10/8/2009    Performed by JAYSHREE VIVEROS at SURGERY [facility]S   • MENISCECTOMY, KNEE, MEDIAL  10/8/2009    Performed by JAYSHREE VIVEROS at SURGERY [facility]   • MENISCECTOMY  10/8/2009    Performed by JAYSHREE VIVEROS at SURGERY [facility]S   • ALHAJI BY LAPAROSCOPY  4/19/2009    Performed by GANSER, JOHN H at SURGERY [facility] ORS   • ROTATOR CUFF REPAIR  2000    left   • HYSTERECTOMY, TOTAL ABDOMINAL  1973       CURRENT MEDICATIONS  Home Medications     Reviewed by Unruly Gr RFernandaNFernanda (Registered Nurse) on 07/14/22 at 1237  Med List Status: Not Addressed   Medication Last Dose Status   albuterol 108 (90 Base) MCG/ACT Aero Soln inhalation aerosol  Active   AMITIZA 8 MCG PO CAPS  Active   aspirin EC (ECOTRIN) 81 MG Tablet Delayed Response  Active   budesonide-formoterol (SYMBICORT) 80-4.5 MCG/ACT Aerosol  Active   celecoxib (CELEBREX) 200 MG Cap  Active   cyclosporin (RESTASIS) 0.05 % ophthalmic emulsion  Active   duloxetine (CYMBALTA) 30 MG CPEP  Active   esomeprazole (NEXIUM) 40 MG delayed-release capsule  Active   metoprolol  "(LOPRESSOR) 25 MG Tab  Active                ALLERGIES  Allergies   Allergen Reactions   • Contrast Media With Iodine [Iodine] Itching     Dyspnea, throat closing   • Tequin [Gatifloxacin Sesquihydrate] Hives     Severe rash and SOB       PHYSICAL EXAM  VITAL SIGNS: /62   Pulse 75   Temp 36.7 °C (98.1 °F) (Temporal)   Resp (!) 24   Ht 1.575 m (5' 2\")   Wt 60.2 kg (132 lb 11.5 oz)   SpO2 92%   BMI 24.27 kg/m²    Constitutional: Awake and alert.  Pleasant Frisian-speaking female  HENT: Normal inspection  Eyes: Normal inspection  Neck: Grossly normal range of motion.  Cardiovascular: Normal heart rate, Normal rhythm.  Symmetric peripheral pulses.   Thorax & Lungs: No respiratory distress, No wheezing, No rales, No rhonchi, left chest wall tenderness  Abdomen: Bowel sounds normal, soft, non-distended, nontender, no mass  Skin: No obvious rash.  Back: Back brace is present, but no focal bony  tenderness, No CVA tenderness.   Extremities: No clubbing, cyanosis, edema, no Homans or cords.  Left shoulder tenderness without specific bony tenderness.  Full range of motion throughout her extremities.  No hip pain or tenderness.  Neurologic: Awake alert.  Clear speech.  Moves all extremities symmetrically.  Cranial nerves are intact  Psychiatric: Normal for situation    RADIOLOGY/PROCEDURES  DX-SHOULDER 2+ LEFT   Final Result         1. No acute osseous abnormality.      CT-HEAD W/O   Final Result      1.  No evidence of acute intracranial process.      2.  Atrophy.         DX-CHEST-PORTABLE (1 VIEW)   Final Result         1. No acute cardiopulmonary abnormalities are identified.           Imaging is interpreted by radiologist    Labs:  Results for orders placed or performed during the hospital encounter of 07/14/22   CBC with Differential   Result Value Ref Range    WBC 4.3 (L) 4.8 - 10.8 K/uL    RBC 4.03 (L) 4.20 - 5.40 M/uL    Hemoglobin 12.0 12.0 - 16.0 g/dL    Hematocrit 37.0 37.0 - 47.0 %    MCV 91.8 81.4 - " 97.8 fL    MCH 29.8 27.0 - 33.0 pg    MCHC 32.4 (L) 33.6 - 35.0 g/dL    RDW 49.1 35.9 - 50.0 fL    Platelet Count 252 164 - 446 K/uL    MPV 9.8 9.0 - 12.9 fL    Neutrophils-Polys 59.60 44.00 - 72.00 %    Lymphocytes 26.90 22.00 - 41.00 %    Monocytes 10.00 0.00 - 13.40 %    Eosinophils 2.30 0.00 - 6.90 %    Basophils 0.70 0.00 - 1.80 %    Immature Granulocytes 0.50 0.00 - 0.90 %    Nucleated RBC 0.00 /100 WBC    Neutrophils (Absolute) 2.55 2.00 - 7.15 K/uL    Lymphs (Absolute) 1.15 1.00 - 4.80 K/uL    Monos (Absolute) 0.43 0.00 - 0.85 K/uL    Eos (Absolute) 0.10 0.00 - 0.51 K/uL    Baso (Absolute) 0.03 0.00 - 0.12 K/uL    Immature Granulocytes (abs) 0.02 0.00 - 0.11 K/uL    NRBC (Absolute) 0.00 K/uL   Complete Metabolic Panel (CMP)   Result Value Ref Range    Sodium 140 135 - 145 mmol/L    Potassium 4.3 3.6 - 5.5 mmol/L    Chloride 107 96 - 112 mmol/L    Co2 24 20 - 33 mmol/L    Anion Gap 9.0 7.0 - 16.0    Glucose 91 65 - 99 mg/dL    Bun 28 (H) 8 - 22 mg/dL    Creatinine 0.69 0.50 - 1.40 mg/dL    Calcium 9.2 8.4 - 10.2 mg/dL    AST(SGOT) 29 12 - 45 U/L    ALT(SGPT) 28 2 - 50 U/L    Alkaline Phosphatase 75 30 - 99 U/L    Total Bilirubin <0.2 0.1 - 1.5 mg/dL    Albumin 3.6 3.2 - 4.9 g/dL    Total Protein 6.3 6.0 - 8.2 g/dL    Globulin 2.7 1.9 - 3.5 g/dL    A-G Ratio 1.3 g/dL   Troponin   Result Value Ref Range    Troponin T 9 6 - 19 ng/L   ESTIMATED GFR   Result Value Ref Range    GFR (CKD-EPI) 84 >60 mL/min/1.73 m 2   D-DIMER   Result Value Ref Range    D-Dimer Screen 0.78 (H) 0.00 - 0.50 ug/mL (FEU)   EKG   Result Value Ref Range    Report       [facility] Emergency Dept.    Test Date:  2022  Pt Name:    AISHA PAGAN              Department: [facility]  MRN:        [id number]                      Room:  Gender:     Female                       Technician: [id number]  :        [id number]                   Requested By:ER TRIAGE PROTOCOL  Order #:    [id number]                    Maisha MD:  RADHA ESCALONA MD    Measurements  Intervals                                Axis  Rate:       74                           P:          41  SC:         196                          QRS:        -31  QRSD:       84                           T:          38  QT:         388  QTc:        431    Interpretive Statements  SINUS RHYTHM  LEFT AXIS DEVIATION  EARLY PRECORDIAL R/S TRANSITION  Compared to ECG 05/21/2019 00:40:45  First degree AV block no longer present  Electronically Signed On 7- 15:20:32 PDT by RADHA ESCALONA MD           COURSE & MEDICAL DECISION MAKING  Patient presents with chest pain most consistent with musculoskeletal etiology.  Obtained EKG without acute ischemia.  Work-up is initiated.  D-dimer was obtained.  Age-adjusted is within normal limits ruling out thromboembolism and low risk patient by Wells criteria.  Troponin is normal with constant symptoms for quite some time.  Vital signs are normal.  She does have a mildly elevated BUN consistent with some degree of dehydration although is drinking liquids here.  There is no clinical suggestion of dissection.  She has a benign abdominal exam.  I did obtain a CT scan of the head because of the fall and reports of worsening dimension.  There is no evidence of intracranial hemorrhage or acute trauma.  Patient will need to follow-up with her primary provider for further assessment of dementia as well as her musculoskeletal complaints.  Have advised Tylenol as needed for discomfort.  Patient to be brought back to the ER for any difficulty breathing, focal neurologic symptoms or concern.    Patient referred to primary provider for blood pressure management    FINAL IMPRESSION  1.  Chest pain  2.  Chest wall pain  3.  Advancing dementia  4.  Left shoulder strain      This dictation was created using voice recognition software. The accuracy of the dictation is limited to the abilities of the software.  The nursing notes were reviewed and certain  aspects of this information were incorporated into this note.      Electronically signed by: Micky Sanchez M.D., 7/14/2022 3:53 PM

## 2022-07-14 NOTE — ED NOTES
Released with all follow-up provided in Swedish, stable with daughter to POV. Instruction to set up My Chart given and daughter verbalized understanding. Encouraged to return with any changes.

## 2022-07-14 NOTE — ED NOTES
Pt wears a back brace, removed for imaging. Pt then states he left leg hurts. PIV started and blood to the lab. She also complains of left shoulder pain.

## 2022-10-21 ENCOUNTER — HOSPITAL ENCOUNTER (OUTPATIENT)
Dept: LAB | Facility: MEDICAL CENTER | Age: 87
End: 2022-10-21
Attending: PSYCHIATRY & NEUROLOGY
Payer: MEDICARE

## 2022-10-21 LAB
EST. AVERAGE GLUCOSE BLD GHB EST-MCNC: 117 MG/DL
HBA1C MFR BLD: 5.7 % (ref 4–5.6)

## 2022-10-21 PROCEDURE — 82652 VIT D 1 25-DIHYDROXY: CPT

## 2022-10-21 PROCEDURE — 82607 VITAMIN B-12: CPT

## 2022-10-21 PROCEDURE — 36415 COLL VENOUS BLD VENIPUNCTURE: CPT

## 2022-10-21 PROCEDURE — 83036 HEMOGLOBIN GLYCOSYLATED A1C: CPT | Mod: GA

## 2022-10-22 LAB — VIT B12 SERPL-MCNC: 1384 PG/ML (ref 211–911)

## 2022-10-24 LAB — 1,25(OH)2D3 SERPL-MCNC: 34.5 PG/ML (ref 19.9–79.3)

## 2023-12-22 PROBLEM — M19.012 OSTEOARTHRITIS OF LEFT SHOULDER: Status: ACTIVE | Noted: 2023-12-22

## 2023-12-22 PROBLEM — M25.512 LEFT SHOULDER PAIN: Status: ACTIVE | Noted: 2023-12-22

## 2024-01-03 ENCOUNTER — APPOINTMENT (OUTPATIENT)
Dept: CARDIOLOGY | Facility: MEDICAL CENTER | Age: 89
End: 2024-01-03
Payer: MEDICARE

## 2024-01-05 ASSESSMENT — FIBROSIS 4 INDEX: FIB4 SCORE: 1.89

## 2024-01-10 ENCOUNTER — OFFICE VISIT (OUTPATIENT)
Dept: CARDIOLOGY | Facility: MEDICAL CENTER | Age: 89
End: 2024-01-10
Attending: INTERNAL MEDICINE
Payer: MEDICARE

## 2024-01-10 VITALS
OXYGEN SATURATION: 90 % | BODY MASS INDEX: 23 KG/M2 | DIASTOLIC BLOOD PRESSURE: 60 MMHG | WEIGHT: 125 LBS | HEIGHT: 62 IN | SYSTOLIC BLOOD PRESSURE: 110 MMHG | RESPIRATION RATE: 16 BRPM | HEART RATE: 88 BPM

## 2024-01-10 DIAGNOSIS — Z86.711 HISTORY OF PULMONARY EMBOLISM: Chronic | ICD-10-CM

## 2024-01-10 DIAGNOSIS — R07.89 OTHER CHEST PAIN: ICD-10-CM

## 2024-01-10 DIAGNOSIS — Z86.73 H/O: STROKE: ICD-10-CM

## 2024-01-10 LAB — EKG IMPRESSION: NORMAL

## 2024-01-10 PROCEDURE — 3074F SYST BP LT 130 MM HG: CPT | Performed by: INTERNAL MEDICINE

## 2024-01-10 PROCEDURE — 93005 ELECTROCARDIOGRAM TRACING: CPT | Performed by: INTERNAL MEDICINE

## 2024-01-10 PROCEDURE — 99213 OFFICE O/P EST LOW 20 MIN: CPT | Performed by: INTERNAL MEDICINE

## 2024-01-10 PROCEDURE — 99214 OFFICE O/P EST MOD 30 MIN: CPT | Performed by: INTERNAL MEDICINE

## 2024-01-10 PROCEDURE — 3078F DIAST BP <80 MM HG: CPT | Performed by: INTERNAL MEDICINE

## 2024-01-10 PROCEDURE — 93010 ELECTROCARDIOGRAM REPORT: CPT | Performed by: INTERNAL MEDICINE

## 2024-01-10 RX ORDER — ROSUVASTATIN CALCIUM 20 MG/1
20 TABLET, COATED ORAL EVERY EVENING
Qty: 90 TABLET | Refills: 3 | Status: SHIPPED | OUTPATIENT
Start: 2024-01-10

## 2024-01-10 RX ORDER — ALPRAZOLAM 0.5 MG/1
0.5 TABLET ORAL NIGHTLY PRN
COMMUNITY

## 2024-01-10 RX ORDER — LORAZEPAM 0.5 MG/1
TABLET ORAL
COMMUNITY
Start: 2023-12-22

## 2024-01-10 RX ORDER — NITROGLYCERIN 0.4 MG/1
0.4 TABLET SUBLINGUAL PRN
Qty: 25 TABLET | Refills: 11 | Status: SHIPPED | OUTPATIENT
Start: 2024-01-10

## 2024-01-10 ASSESSMENT — ENCOUNTER SYMPTOMS
MYALGIAS: 1
FALLS: 1
NERVOUS/ANXIOUS: 1
MEMORY LOSS: 1

## 2024-01-10 ASSESSMENT — FIBROSIS 4 INDEX: FIB4 SCORE: 1.89

## 2024-01-10 NOTE — LETTER
PROCEDURE/SURGERY CLEARANCE FORM      Encounter Date: 1/10/2024    Patient: Navya Mcbride  YOB: 1935    CARDIOLOGIST:  Tc Pike M.D.    REFERRING DOCTOR:  Dr Matute      The above patient is cleared to have the following procedure/surgery: shoulder surgery                                           Additional comments: She can proceed with the proposed procedure or surgery from a cardiac standpoint, no modifiable cardiovascular risk, no further cardiac testing required, hold antiplatelet as necessary, resume as soon as possible typically when patient is able to take oral medications.  She has a history of pulmonary embolism in 2009 after knee arthroscopy so needs to be proactively active post procedure.    It is my pleasure to participate in the care of Ms. Mcbride.  Please do not hesitate to contact me with questions or concerns. [facility] is available 24/7 for consultative services at [phone] in the perioperative period.    Electronically Signed    Tc Pike MD PhD [facility]  Cardiologist [facility]

## 2024-01-10 NOTE — PATIENT INSTRUCTIONS
Carlito Rogers M.D.  Physician  Specialty: Interventional Cardiology     Procedures  Signed     Date of Service: 6/11/2015 10:27 AM     DATE OF PROCEDURE:  06/11/2015     PREOPERATIVE  DIAGNOSES:  1.  Chest discomfort with abnormal myocardial perfusion imaging.  2.  History of pulmonary embolism.  3.  History of gastroesophageal reflux disease.     POSTOPERATIVE DIAGNOSES:  1.  No significant coronary artery disease.  2.  Normal left systolic function and wall motion.  3.  History of pulmonary embolism.  4.  History of gastroesophageal reflux disease.     PROCEDURES:  1.  Left heart catheterization with left ventriculography.  2.  Selective coronary angiography.     COMPLICATIONS:  None.     DESCRIPTION OF THE PROCEDURE:  After informed consent was obtained, the   patient brought to cardiac catheterization laboratory in fasting state.  Jairo   test was carried on the right hand and was found to be negative.  Right wrist   and forearm area were then prepped and draped in usual sterile fashion.    Xylocaine 1% was used as a local anesthesia.  Versed and fentanyl were   administered for conscious sedation.  The patient reports history of ALLERGY   TO IODINE, she was therefore given Benadryl.  On her arrival, she also has   been given prednisone at home prior to the procedure.     Next, 1% Xylocaine was used to anesthetize the right radial artery area.    Versed and fentanyl were administered for conscious sedation.  Next, a   6-Frisian Terumo glide sheath was placed in the right radial artery using   Seldinger technique.  A 2.5 mg verapamil, 100 mcg nitroglycerin, and 4000   heparin administered sheath intersheath.     Next, a 5-Frisian Rufino catheter was introduced over the J-wire into ascending   aorta subsequently engaged into the right coronary artery.  Selective   angiography of the right coronary artery was then performed in multiple views.     Next, attempt to cannulate the left main with the Rufino  catheter, but the   Rufino catheter would not engage well.  Angiography performed with the Rufino   near the ostium of the left main with decent picture.  To better visualize the   left system, the Rufino catheter was then exchanged for a 5-Ghanaian TIG   catheter.  The TIG catheter also did not engage well in part due to signficant   tortuosity of the subclavian artery.  Angiography of the left coronary artery   was performed in multiple views; however. The TIG catheter was then removed   over the wire and then exchanged for a 6-Ghanaian angled pigtail catheter.  The   pigtail was then advanced to the left ventricle.  Left ventricular pressure   was then recorded.  Left ventriculography was then performed using 30 mL of   contrast injected over 3 seconds.  Left heart pullback was subsequently   performed.  Pigtail was then removed from the wire.  Radial sheath was   subsequently removed.  Hemostasis was obtained using Terumo TR wristband.    Patient tolerated the procedure well and left cardiac catheterization   laboratory in stable condition.     FINDINGS:  1.  Hemodynamic LV systolic pressure was about 100 mmHg.  There was no   gradient across the aortic valve.  Left ventricular end-diastolic pressure was   about 14-16 mmHg.  2.  Left ventriculography showed normal left ventricular systolic function and   wall motion.  Calculated ejection fraction was 73%.  There was no significant   mitral regurgitation noted.  3.  No significant coronary artery disease.    The left main is a large-caliber vessel. It is relatively short and   angiographically free of disease.  It trifurcates into the left anterior   descending artery, medium-sized ramus intermediate artery and left circumflex   artery.    The left anterior descending artery is around 2.5-3 mm in diameter.  It   terminated around the apex.  It gives rises to one medium to large diagonal   branch in the mid segment.  Mild calcification was noted in proximal left    anterior descending artery along with 5-10% stenosis, but no flow-limiting   disease seen.  Antegrade flow was normal.     The left circumflex artery is around 2.5 mm in diameter.  It terminated into   a medium to large lower obtuse marginal branch and a small AV groove branch.    No significant disease in the left circumflex artery or its major branches.    The right coronary artery is a dominant system. The right coronary artery is   around 3.5 mm in diameter. It gives rise to a conus branch, a few small to   medium-sized acute marginal branches, large posterolateral branch and   posterior descending artery.  No significant disease was noted in the right   coronary artery or its major branches.     PLAN:  Limit right wrist movement for 24 hours.  Risk factor modification.    Evaluate for other causes of chest discomfort.        ____________________________________     MD KALEIGH PAREDES / MEETA     DD:  06/11/2015 10:27:51          I am a [facility] Cardiologist providing cardiology care with [facility] at:    [facility] for those over 65 ([address])   [facility] ([address])     For clinic appointment scheduling, please contact [facility] at [phone] ([place]) or [phone] ([facility]).    For all clinical questions related to your heart care including medications, always contact me at [facility]iology Office in [place] by sending a MyChart or calling [phone].    We have Pacer check in person available in [facility] for those with [employer] or [employer] Pacemakers. Depending on insurance, you can also do in-person at [facility] ([address], [phone]) on Wednesdays with CLARA Aviles.     Most testing (Labs, Echo, Nuclear Stress Test, Vascular Screening) can be done at [facility] please call [phone] to schedule.    For Cardiac PET, Stress Echo, Cardiac MRI, Cardiac CTA these are only  done in [place] please call [facility] [phone] to schedule.    If you need a procedure such as angiogram, heart stent, pacemaker or ablation my office will contact you for scheduling.      If you need to see me more than once a year, or see us urgently in person, WAN Beltran can see you as well.  There are three other Cardiologists who work with me here at [facility] (Dr Janine Lowe, Dr Ulices Sifuentes and Dr Carlos Santamaria)    [employer] is the easiest way to communicate with my office on your smartphone or computer via the internet.    [facility] Customer Support for [employer], call [phone].      Work on at least 2.5 - 5 hours a week of moderate exercise (typical brisk walking or similar activity)    If you have had a heart attack, stent, bypass or reduced heart strength (EF <35%): cardiac rehab may reduce your risk of dying by 13-24% and need to go to the hospital by 30% within the first year (1)    Please look into the following diets and incorporate them into your diet    LOW SALT DIET   KEEP YOUR SODIUM EQUAL TO CALORIES AND NO MORE THAN DOUBLE THE CALORIES FOR A LOW SALT DIET    Cardiosmart.org - great resource for American College of Cardiology on heart disease prevention and treatment    FOR TREATMENT OR PREVENTION OF CORONARY ARTERY DISEASE  These three programs are approved by Medicare/Insurers for those with heart disease  [facility]  Dr. Jimenez's Program for Reversing Heart Disease - Farhan Johnsons Cardiologist vegetarian-based  [facility] Cardiac Wellness Program - [place]-based mind-body Program    Mediterranean Diet has been shown to be a hearty healthy diet.    This is a commonly referenced Program  Dr Kilpatrick - [place] over [place] (book and documentary) - vegetarian-based    FOR TREATMENT OF BLOOD PRESSURE  DASH DIET - American Heart Association for treatment of HYPERTENSION    FOR TREATMENT OF BAD CHOLESTEROL/FATS  REDUCE PROCESSED SUGAR AS MUCH  AS POSSIBLE  INCREASE WHOLE GRAINS/VEGETABLES  INCREASE FIBER    Lowering total cholesterol and LDL (bad) cholesterol:  - Eat leaner cuts of meat, or eliminate altogether if possible red meat, and frequently substitute fish or chicken.  - Limit saturated fat to no more than 7-10% of total calories no more than 10 g per day is recommended. Some sources of saturated fat include butter, animal fats, hydrogenated vegetable fats and oils, many desserts, whole milk dairy products.  - Replaced saturated fats with polyunsaturated fats and monounsaturated fats. Foods high in monounsaturated fat include nuts, canola oil, avocados, and olives.  - Limit trans fat (processed foods) and replaced with fresh fruits and vegetables  - Recommend nonfat dairy products  - Increase substantially the amount of soluble fiber intake (legumes such as beans, fruit, whole grains).  - Consider nutritional supplements: plant sterile spreads such as Benecol, fish oil,  flaxseed oil, omega-3 acids capsules 1000 mg twice a day, or viscous fiber such as Metamucil  - Attain ideal weight and regular exercise (at least 30 minutes per day of moderate exercise)  ASK ABOUT STATIN OR NON STATIN MEDICATION TO REDUCE YOUR LDL AND HEART RISK    Lowering triglycerides:  - Reduce intake of simple sugar: Desserts, candy, pastries, honey, sodas, sugared cereals, yogurt, Gatorade, sports bars, canned fruit, smoothies, fruit juice, coffee drinks  - Reduced intake of refined starches: Refined Pasta, most bread  - Reduce or abstain from alcohol  - Increase omega-3 fatty acids: [place], Trout, Mackerel, Herring, Albacore tuna and supplements  - Attain ideal weight and regular exercise (at least 30 minutes per day of moderate exercise)  ASK ABOUT PURIFIED OMEGA 3 EPA or FISH OIL TO REDUCE YOUR TG AND HEART RISK    Elevating HDL (good) cholesterol:  - Increase physical activity  - Increase omega-3 fatty acids and supplements as listed above  - Incorporating appropriate  amounts of monounsaturated fats such as nuts, olive oil, canola oil, avocados, olives  - Stop smoking  - Attain ideal weight and regular exercise (at least 30 minutes per day of moderate exercise)

## 2024-01-11 ENCOUNTER — APPOINTMENT (OUTPATIENT)
Dept: ADMISSIONS | Facility: MEDICAL CENTER | Age: 89
End: 2024-01-11
Attending: ORTHOPAEDIC SURGERY
Payer: MEDICARE

## 2024-01-11 ASSESSMENT — ENCOUNTER SYMPTOMS
BRUISES/BLEEDS EASILY: 0
SORE THROAT: 0
PALPITATIONS: 0
SHORTNESS OF BREATH: 0
FEVER: 0
FOCAL WEAKNESS: 0
NAUSEA: 0
DIZZINESS: 0
CHILLS: 0
ABDOMINAL PAIN: 0
CLAUDICATION: 0
COUGH: 0
BLURRED VISION: 0
PND: 0
WEAKNESS: 0

## 2024-01-11 NOTE — PROGRESS NOTES
"Chief Complaint   Patient presents with    Chest Pain    Transient Ischemic Attack       Subjective     Camden Margaux Mcbride is a 88 y.o. female who presents today  in consultation from José Luis Matute for evaluation of preoperative risk assessment for shoulder surgery.  In 2015 she had angiogram in the setting of abnormal stress test with normal coronary arteries she is on Plavix for her history of stroke and has a remote history of pulmonary embolism in 2009 after a knee arthroscopy she is here with her daughter who is a nurse.    She does get rare chest pains these seem noncardiac but they like to try nitroglycerin    Past Medical History:   Diagnosis Date    Anemia     chronic    Anesthesia     \"hard to wake up\"    Arthritis     hands, states RA and Osteo    Asthma     uses inhaler    Backpain     compressed discs    Breath shortness     oxygen 2L N/C prn    CATARACT     Clostridium difficile infection 01/01/2009    Coarse tremors     Colitis     Dental disorder     chronic gum infection    Fibromyalgia     Gastritis     Heart burn     Hiatus hernia syndrome     History of pulmonary embolism - 2009 after knee surgery     2009 after knee arthroscopy.    Indigestion     Myocardial infarct (HCC) 05/01/2015    silent MI    Osteoporosis     Other specified disorder of intestines     colitis, bloating, constipation    Pain     fibromyalgia    Pneumonia     4/4/2011    Psychiatric problem     depression    Pulmonary embolism (HCC)     left lung    Stroke (HCC) 01/01/2009    right weakness    Urinary bladder disorder     hematuria chronic     Past Surgical History:   Procedure Laterality Date    RECOVERY  6/11/2015    Procedure:  CATH LAB  [facility] WITH POSSFernanda ALFLOR ICD; 794.30;  Surgeon: Recoveryonly Surgery;  Location: SURGERY PRE-POST PROC UNIT [facility];  Service:     KNEE ARTHROSCOPY  10/8/2009    Performed by JAYSHREE VIVEROS at [facility]    MENISCECTOMY, KNEE, MEDIAL  10/8/2009    Performed by FELICE, " JAYSHREE GUY at SURGERY [facility]    MENISCECTOMY  10/8/2009    Performed by JAYSHREE VIVEROS at SURGERY [facility]    ALHAJI BY LAPAROSCOPY  4/19/2009    Performed by GANSER, JOHN H at SURGERY [facility]    ROTATOR CUFF REPAIR  2000    left    HYSTERECTOMY, TOTAL ABDOMINAL  1973     Family History   Problem Relation Age of Onset    Anesthesia Father         difficulty waking post anesthesia    Diabetes Other     Heart Disease Other     Hypertension Other     Stroke Other     Cancer Other      Social History     Socioeconomic History    Marital status:      Spouse name: Not on file    Number of children: Not on file    Years of education: Not on file    Highest education level: Not on file   Occupational History    Not on file   Tobacco Use    Smoking status: Never    Smokeless tobacco: Never   Vaping Use    Vaping Use: Never used   Substance and Sexual Activity    Alcohol use: No    Drug use: No    Sexual activity: Not on file   Other Topics Concern    Not on file   Social History Narrative    Not on file     Social Determinants of Health     Financial Resource Strain: Not on file   Food Insecurity: Not on file   Transportation Needs: Not on file   Physical Activity: Not on file   Stress: Not on file   Social Connections: Not on file   Intimate Partner Violence: Not on file   Housing Stability: Not on file     Allergies   Allergen Reactions    Contrast Media With Iodine [Iodine] Itching     Dyspnea, throat closing    Tequin [Gatifloxacin Sesquihydrate] Hives     Severe rash and SOB     Outpatient Encounter Medications as of 1/10/2024   Medication Sig Dispense Refill    LORazepam (ATIVAN) 0.5 MG Tab TAKE 1 TABLET ORALLY TWICE A DAY AS NEEDED FOR 30 DAYS F41.9      ALPRAZolam (XANAX) 0.5 MG Tab Take 0.5 mg by mouth at bedtime as needed for Sleep.      rosuvastatin (CRESTOR) 20 MG Tab Take 1 Tablet by mouth every evening. 90 Tablet 3    Coenzyme Q10 300 MG Cap Take 1 Capsule by mouth every day. 30  Capsule     nitroglycerin (NITROSTAT) 0.4 MG SL Tab Place 1 Tablet under the tongue as needed for Chest Pain. 25 Tablet 11    gabapentin (NEURONTIN) 300 MG Cap Take 300 mg by mouth 2 times a day.      clopidogrel (PLAVIX) 75 MG Tab TAKE 1 TABLET BY MOUTH EVERY DAY FOR 90 DAYS      DULoxetine (CYMBALTA) 60 MG Cap DR Particles delayed-release capsule Take 60 mg by mouth every day.      COMBIVENT RESPIMAT  MCG/ACT Aero Soln       latanoprost (XALATAN) 0.005 % Solution INSTILL 1 DROP INTO AFFECTED EYE(S) BY OPHTHALMIC ROUTE ONCE DAILY IN THE EVENING      pantoprazole (PROTONIX) 40 MG Tablet Delayed Response 1 TABLET ORALLY ONCE A DAY 30 MINUTES AFTER A MEAL 90 DAYS      budesonide-formoterol (SYMBICORT) 80-4.5 MCG/ACT Aerosol Inhale 2 Puffs by mouth 2 Times a Day.      metoprolol (LOPRESSOR) 25 MG Tab Take 0.5 Tabs by mouth 2 times a day. 30 Tab 0    albuterol 108 (90 Base) MCG/ACT Aero Soln inhalation aerosol Inhale 2 Puffs by mouth every 6 hours as needed for Shortness of Breath.      esomeprazole (NEXIUM) 40 MG delayed-release capsule Take 40 mg by mouth every morning before breakfast.      celecoxib (CELEBREX) 200 MG Cap Take 200 mg by mouth every day.      cyclosporin (RESTASIS) 0.05 % ophthalmic emulsion Place 1 Drop in both eyes 2 times a day.      [DISCONTINUED] HYDROcodone-acetaminophen (NORCO) 7.5-325 MG tab TAKE 1 TABLET BY MOUTH THREE TIMES A DAY AS NEEDED FOR 30 DAYS      [DISCONTINUED] memantine (NAMENDA) 5 MG Tab       [DISCONTINUED] metoprolol SR (TOPROL XL) 25 MG TABLET SR 24 HR TAKE 1 / 2 TABLET ORALLY TWICE A DAY 90 DAYS (Patient not taking: Reported on 1/10/2024)      [DISCONTINUED] aspirin EC (ECOTRIN) 81 MG Tablet Delayed Response Take 81 mg by mouth every day.      [DISCONTINUED] AMITIZA 8 MCG PO CAPS Take 8 mcg by mouth 2 Times a Day.       No facility-administered encounter medications on file as of 1/10/2024.     Review of Systems   Constitutional:  Positive for malaise/fatigue. Negative  "for chills and fever.   HENT:  Negative for sore throat.    Eyes:  Negative for blurred vision.   Respiratory:  Negative for cough and shortness of breath.    Cardiovascular:  Negative for chest pain, palpitations, claudication, leg swelling and PND.   Gastrointestinal:  Negative for abdominal pain and nausea.   Musculoskeletal:  Positive for falls, joint pain and myalgias.   Skin:  Negative for rash.   Neurological:  Negative for dizziness, focal weakness and weakness.   Endo/Heme/Allergies:  Does not bruise/bleed easily.   Psychiatric/Behavioral:  Positive for memory loss. The patient is nervous/anxious.               Objective     /60 (BP Location: Left arm, Patient Position: Sitting, BP Cuff Size: Adult)   Pulse 88   Resp 16   Ht 1.575 m (5' 2\")   Wt 56.7 kg (125 lb)   SpO2 90%   BMI 22.86 kg/m²     Physical Exam  Constitutional:       General: She is not in acute distress.     Appearance: She is not diaphoretic.   Eyes:      General: No scleral icterus.  Neck:      Vascular: No JVD.   Cardiovascular:      Rate and Rhythm: Normal rate.      Heart sounds: Normal heart sounds. No murmur heard.     No friction rub. No gallop.   Pulmonary:      Effort: No respiratory distress.      Breath sounds: No wheezing or rales.   Abdominal:      General: Bowel sounds are normal.      Palpations: Abdomen is soft.   Musculoskeletal:      Right lower leg: No edema.      Left lower leg: No edema.   Skin:     Findings: No rash.   Neurological:      Mental Status: She is alert. Mental status is at baseline.   Psychiatric:         Mood and Affect: Mood normal.          We reviewed in person the most recent labs  Recent Results (from the past 5040 hour(s))   EKG    Collection Time: 01/10/24  1:51 PM   Result Value Ref Range    Report       [facility]    Test Date:  2024-01-10  Pt Name:    AISHA PAGAN              Department: [facility]  MRN:        [id number]                      Room:  Gender:     Female      "                  Technician: [facility]  :        [id number]                   Requested By:HUGH ESCOBAR  Order #:    [id number]                    Reading MD: Hugh Escobar MD    Measurements  Intervals                                Axis  Rate:       83                           P:          63  WI:         189                          QRS:        -41  QRSD:       87                           T:          47  QT:         375  QTc:        441    Interpretive Statements  Sinus rhythm  Left axis deviation  Low voltage, precordial leads  RSR' in V1 or V2, right VCD or RVH  Compared to ECG 2022 12:20:10  NO SIGNIFICANT CHANGES  Electronically Signed On 01- 17:47:24 PST by Hugh Escobra MD             Assessment & Plan     1. Other chest pain  EKG    nitroglycerin (NITROSTAT) 0.4 MG SL Tab      2. H/O: stroke  rosuvastatin (CRESTOR) 20 MG Tab    Coenzyme Q10 300 MG Cap      3. History of pulmonary embolism -  after knee surgery            Medical Decision Making: Today's Assessment/Status/Plan:      It was my pleasure to meet with Ms. Mcbride.    We addressed the management of hypertension at today's visit. Blood pressure is well controlled.  We specifically assessed the labs on hypertension treatment      The main concern about elective surgery is her age and history of pulmonary embolism there is nothing that can be done to optimize this so with her shoulder is severely debilitating seems reasonable to proceed with surgery    She can proceed with the proposed procedure or surgery from a cardiac standpoint, no modifiable cardiovascular risk, no further cardiac testing required, hold antiplatelet as necessary, resume as soon as possible typically when patient is able to take oral medications.    It is my pleasure to participate in the care of Ms. Mcbride.  Please do not hesitate to contact me with questions or concerns. [facility] is available  for consultative services at  [phone] in the perioperative period.    Electronically Signed    Tc Pike MD PhD FACC  Cardiologist [facility]

## 2024-01-16 ENCOUNTER — PRE-ADMISSION TESTING (OUTPATIENT)
Dept: ADMISSIONS | Facility: MEDICAL CENTER | Age: 89
End: 2024-01-16
Attending: ORTHOPAEDIC SURGERY
Payer: MEDICARE

## 2024-01-16 VITALS — HEIGHT: 61 IN | BODY MASS INDEX: 23.62 KG/M2

## 2024-01-16 RX ORDER — OXYCODONE HYDROCHLORIDE 5 MG/1
7.5 TABLET ORAL EVERY 4 HOURS PRN
COMMUNITY

## 2024-04-18 ENCOUNTER — PRE-ADMISSION TESTING (OUTPATIENT)
Dept: ADMISSIONS | Facility: MEDICAL CENTER | Age: 89
End: 2024-04-18
Attending: ORTHOPAEDIC SURGERY
Payer: MEDICARE

## 2024-04-22 ENCOUNTER — PRE-ADMISSION TESTING (OUTPATIENT)
Dept: ADMISSIONS | Facility: MEDICAL CENTER | Age: 89
End: 2024-04-22
Attending: ORTHOPAEDIC SURGERY
Payer: MEDICARE

## 2024-04-22 VITALS — WEIGHT: 134 LBS | BODY MASS INDEX: 24.66 KG/M2 | HEIGHT: 62 IN

## 2024-04-22 DIAGNOSIS — Z01.812 PRE-OPERATIVE LABORATORY EXAMINATION: ICD-10-CM

## 2024-04-22 RX ORDER — QUETIAPINE FUMARATE 50 MG/1
50 TABLET, FILM COATED ORAL
COMMUNITY

## 2024-04-22 RX ORDER — CARBAMAZEPINE 200 MG/1
200 CAPSULE, EXTENDED RELEASE ORAL EVERY EVENING
COMMUNITY

## 2024-04-22 RX ORDER — HYDROCODONE BITARTRATE AND ACETAMINOPHEN 7.5; 325 MG/1; MG/1
1 TABLET ORAL EVERY 6 HOURS PRN
COMMUNITY

## 2024-04-22 ASSESSMENT — FIBROSIS 4 INDEX: FIB4 SCORE: 1.89

## 2024-04-22 NOTE — PREPROCEDURE INSTRUCTIONS
PreAdmit Telephone Appointment completed with pt and daughter, Malinda daughter translating Emirati., including pt HX and medication review.  Reviewed the Preparing for your procedure handout with patient over the phone. Patient instructed per pharmacy guidelines regarding taking or holding regularly prescribed medications before surgery. Instructed to take the following medications the day of surgery with a sip of water per pharmacy medication guidelines: symbicort inhaler, duloxetine,metoprolol, pantoprazole, ativan if needed, norco if needed, combivent and albuterol if needed.nitrostat if needed.  Holding plavix per MD instructions.   Pt/daughter verbalizes understanding of all medication and preadmit instruction.     Pt instructed to report any flu/cold sx to surgeon. METS <4, uses 3L nc 02 at night for NAZ and as needed during day. No NAZ device, no portable tank she can bring DOS.

## 2024-04-23 ENCOUNTER — PRE-ADMISSION TESTING (OUTPATIENT)
Dept: ADMISSIONS | Facility: MEDICAL CENTER | Age: 89
End: 2024-04-23
Attending: ORTHOPAEDIC SURGERY
Payer: MEDICARE

## 2024-04-23 DIAGNOSIS — Z01.812 PRE-OPERATIVE LABORATORY EXAMINATION: ICD-10-CM

## 2024-04-23 LAB
ANION GAP SERPL CALC-SCNC: 11 MMOL/L (ref 7–16)
BUN SERPL-MCNC: 19 MG/DL (ref 8–22)
CALCIUM SERPL-MCNC: 9.7 MG/DL (ref 8.4–10.2)
CHLORIDE SERPL-SCNC: 106 MMOL/L (ref 96–112)
CO2 SERPL-SCNC: 24 MMOL/L (ref 20–33)
CREAT SERPL-MCNC: 0.7 MG/DL (ref 0.5–1.4)
ERYTHROCYTE [DISTWIDTH] IN BLOOD BY AUTOMATED COUNT: 47.2 FL (ref 35.9–50)
GFR SERPLBLD CREATININE-BSD FMLA CKD-EPI: 83 ML/MIN/1.73 M 2
GLUCOSE SERPL-MCNC: 103 MG/DL (ref 65–99)
HCT VFR BLD AUTO: 36.5 % (ref 37–47)
HGB BLD-MCNC: 11.8 G/DL (ref 12–16)
MCH RBC QN AUTO: 29.2 PG (ref 27–33)
MCHC RBC AUTO-ENTMCNC: 32.3 G/DL (ref 32.2–35.5)
MCV RBC AUTO: 90.3 FL (ref 81.4–97.8)
PLATELET # BLD AUTO: 257 K/UL (ref 164–446)
PMV BLD AUTO: 10.7 FL (ref 9–12.9)
POTASSIUM SERPL-SCNC: 4.5 MMOL/L (ref 3.6–5.5)
RBC # BLD AUTO: 4.04 M/UL (ref 4.2–5.4)
SCCMEC + MECA PNL NOSE NAA+PROBE: NEGATIVE
SCCMEC + MECA PNL NOSE NAA+PROBE: POSITIVE
SODIUM SERPL-SCNC: 141 MMOL/L (ref 135–145)
WBC # BLD AUTO: 6.2 K/UL (ref 4.8–10.8)

## 2024-04-23 PROCEDURE — 85027 COMPLETE CBC AUTOMATED: CPT

## 2024-04-23 PROCEDURE — 80048 BASIC METABOLIC PNL TOTAL CA: CPT

## 2024-04-23 PROCEDURE — 36415 COLL VENOUS BLD VENIPUNCTURE: CPT

## 2024-04-23 PROCEDURE — 87640 STAPH A DNA AMP PROBE: CPT

## 2024-04-23 PROCEDURE — 87641 MR-STAPH DNA AMP PROBE: CPT

## 2024-04-23 NOTE — DISCHARGE PLANNING
DISCHARGE PLANNING NOTE - TOTAL JOINT    Procedure: Procedure(s):  ARTHROPLASTY, SHOULDER, TOTAL, REVERSE, LEFT  Procedure Date: 4/25/2024  Insurance: Payor: MEDICARE / Plan: MEDICARE PART A & B / Product Type: *No Product type* /    Equipment currently available at home?  raised toilet seat, shower chair, and ice packs ,fww  Steps into the home? 0  Steps within the home? 0  Toilet height? Standard  Type of shower? walk-in shower  Home Oxygen? 3 liters at hs  Portable tank?  No  Oxygen Provider:  Deann  Who will be with you during your recovery? Daughters, three whom she lives with  Is Outpatient Physical Therapy set up after surgery? Yes  Did you take the Total Joint Class and where? Yes, received NAON book.  Planning same day discharge?No prefers to stay over.    This writer met with pt and her daughter who served as . All educational material in Mozambican version. Pt educated to preop showers, nasal mrsa swab which was obtained by this writer, and potential for overnight stay. CHG kit given to pt. Home safety checklist sent home with pt. Pt educated to dc criteria. All questions answered and verbalizes understanding of all instructions. No dc needs identified at this time. Anticipate dc to home without barriers.

## 2024-04-24 ENCOUNTER — ANESTHESIA EVENT (OUTPATIENT)
Dept: SURGERY | Facility: MEDICAL CENTER | Age: 89
End: 2024-04-24
Payer: MEDICARE

## 2024-04-25 ENCOUNTER — HOSPITAL ENCOUNTER (OUTPATIENT)
Facility: MEDICAL CENTER | Age: 89
End: 2024-04-26
Attending: ORTHOPAEDIC SURGERY | Admitting: ORTHOPAEDIC SURGERY
Payer: MEDICARE

## 2024-04-25 ENCOUNTER — ANESTHESIA (OUTPATIENT)
Dept: SURGERY | Facility: MEDICAL CENTER | Age: 89
End: 2024-04-25
Payer: MEDICARE

## 2024-04-25 ENCOUNTER — APPOINTMENT (OUTPATIENT)
Dept: RADIOLOGY | Facility: MEDICAL CENTER | Age: 89
End: 2024-04-25
Attending: ORTHOPAEDIC SURGERY
Payer: MEDICARE

## 2024-04-25 DIAGNOSIS — G89.29 CHRONIC LEFT SHOULDER PAIN: ICD-10-CM

## 2024-04-25 DIAGNOSIS — M25.512 CHRONIC LEFT SHOULDER PAIN: ICD-10-CM

## 2024-04-25 PROBLEM — Z96.612 S/P SHOULDER REPLACEMENT, LEFT: Status: ACTIVE | Noted: 2024-04-25

## 2024-04-25 PROBLEM — I63.9 CVA (CEREBRAL VASCULAR ACCIDENT) (HCC): Status: ACTIVE | Noted: 2024-04-25

## 2024-04-25 PROBLEM — T88.59XA DELAYED EMERGENCE FROM ANESTHESIA: Status: ACTIVE | Noted: 2024-04-25

## 2024-04-25 PROCEDURE — 160009 HCHG ANES TIME/MIN: Performed by: ORTHOPAEDIC SURGERY

## 2024-04-25 PROCEDURE — 700111 HCHG RX REV CODE 636 W/ 250 OVERRIDE (IP): Mod: JZ | Performed by: ORTHOPAEDIC SURGERY

## 2024-04-25 PROCEDURE — 160048 HCHG OR STATISTICAL LEVEL 1-5: Performed by: ORTHOPAEDIC SURGERY

## 2024-04-25 PROCEDURE — 160041 HCHG SURGERY MINUTES - EA ADDL 1 MIN LEVEL 4: Performed by: ORTHOPAEDIC SURGERY

## 2024-04-25 PROCEDURE — 73020 X-RAY EXAM OF SHOULDER: CPT | Mod: LT

## 2024-04-25 PROCEDURE — 770030 HCHG ROOM/CARE - EXTENDED RECOVERY EACH 15 MIN

## 2024-04-25 PROCEDURE — 700101 HCHG RX REV CODE 250: Performed by: ANESTHESIOLOGY

## 2024-04-25 PROCEDURE — 160036 HCHG PACU - EA ADDL 30 MINS PHASE I: Performed by: ORTHOPAEDIC SURGERY

## 2024-04-25 PROCEDURE — 700111 HCHG RX REV CODE 636 W/ 250 OVERRIDE (IP): Performed by: ANESTHESIOLOGY

## 2024-04-25 PROCEDURE — C1713 ANCHOR/SCREW BN/BN,TIS/BN: HCPCS | Performed by: ORTHOPAEDIC SURGERY

## 2024-04-25 PROCEDURE — 94760 N-INVAS EAR/PLS OXIMETRY 1: CPT

## 2024-04-25 PROCEDURE — 700102 HCHG RX REV CODE 250 W/ 637 OVERRIDE(OP): Performed by: ANESTHESIOLOGY

## 2024-04-25 PROCEDURE — A9270 NON-COVERED ITEM OR SERVICE: HCPCS | Performed by: ORTHOPAEDIC SURGERY

## 2024-04-25 PROCEDURE — 700105 HCHG RX REV CODE 258: Performed by: ORTHOPAEDIC SURGERY

## 2024-04-25 PROCEDURE — A9270 NON-COVERED ITEM OR SERVICE: HCPCS | Performed by: ANESTHESIOLOGY

## 2024-04-25 PROCEDURE — 502000 HCHG MISC OR IMPLANTS RC 0278: Performed by: ORTHOPAEDIC SURGERY

## 2024-04-25 PROCEDURE — 700111 HCHG RX REV CODE 636 W/ 250 OVERRIDE (IP): Performed by: ORTHOPAEDIC SURGERY

## 2024-04-25 PROCEDURE — 160002 HCHG RECOVERY MINUTES (STAT): Performed by: ORTHOPAEDIC SURGERY

## 2024-04-25 PROCEDURE — C1776 JOINT DEVICE (IMPLANTABLE): HCPCS | Performed by: ORTHOPAEDIC SURGERY

## 2024-04-25 PROCEDURE — 502240 HCHG MISC OR SUPPLY RC 0272: Performed by: ORTHOPAEDIC SURGERY

## 2024-04-25 PROCEDURE — 160035 HCHG PACU - 1ST 60 MINS PHASE I: Performed by: ORTHOPAEDIC SURGERY

## 2024-04-25 PROCEDURE — 23472 RECONSTRUCT SHOULDER JOINT: CPT | Mod: LT | Performed by: ORTHOPAEDIC SURGERY

## 2024-04-25 PROCEDURE — 160029 HCHG SURGERY MINUTES - 1ST 30 MINS LEVEL 4: Performed by: ORTHOPAEDIC SURGERY

## 2024-04-25 PROCEDURE — 94640 AIRWAY INHALATION TREATMENT: CPT

## 2024-04-25 PROCEDURE — 700102 HCHG RX REV CODE 250 W/ 637 OVERRIDE(OP): Performed by: ORTHOPAEDIC SURGERY

## 2024-04-25 PROCEDURE — 64415 NJX AA&/STRD BRCH PLXS IMG: CPT | Performed by: ORTHOPAEDIC SURGERY

## 2024-04-25 DEVICE — IMPLANTABLE DEVICE: Type: IMPLANTABLE DEVICE | Site: SHOULDER | Status: FUNCTIONAL

## 2024-04-25 RX ORDER — OXYCODONE HCL 5 MG/5 ML
2.5 SOLUTION, ORAL ORAL
Status: DISCONTINUED | OUTPATIENT
Start: 2024-04-25 | End: 2024-04-25 | Stop reason: HOSPADM

## 2024-04-25 RX ORDER — HYDROMORPHONE HYDROCHLORIDE 1 MG/ML
0.1 INJECTION, SOLUTION INTRAMUSCULAR; INTRAVENOUS; SUBCUTANEOUS
Status: DISCONTINUED | OUTPATIENT
Start: 2024-04-25 | End: 2024-04-25 | Stop reason: HOSPADM

## 2024-04-25 RX ORDER — QUETIAPINE FUMARATE 25 MG/1
50 TABLET, FILM COATED ORAL
Status: DISCONTINUED | OUTPATIENT
Start: 2024-04-25 | End: 2024-04-26 | Stop reason: HOSPADM

## 2024-04-25 RX ORDER — HYDROMORPHONE HYDROCHLORIDE 1 MG/ML
0.4 INJECTION, SOLUTION INTRAMUSCULAR; INTRAVENOUS; SUBCUTANEOUS
Status: DISCONTINUED | OUTPATIENT
Start: 2024-04-25 | End: 2024-04-25 | Stop reason: HOSPADM

## 2024-04-25 RX ORDER — GABAPENTIN 300 MG/1
300 CAPSULE ORAL
Status: DISCONTINUED | OUTPATIENT
Start: 2024-04-25 | End: 2024-04-26 | Stop reason: HOSPADM

## 2024-04-25 RX ORDER — EPHEDRINE SULFATE 50 MG/ML
5 INJECTION, SOLUTION INTRAVENOUS
Status: DISCONTINUED | OUTPATIENT
Start: 2024-04-25 | End: 2024-04-25 | Stop reason: HOSPADM

## 2024-04-25 RX ORDER — OXYCODONE HYDROCHLORIDE 10 MG/1
10 TABLET ORAL
Status: DISCONTINUED | OUTPATIENT
Start: 2024-04-25 | End: 2024-04-26 | Stop reason: SINTOL

## 2024-04-25 RX ORDER — EPHEDRINE SULFATE 50 MG/ML
INJECTION, SOLUTION INTRAVENOUS PRN
Status: DISCONTINUED | OUTPATIENT
Start: 2024-04-25 | End: 2024-04-25 | Stop reason: SURG

## 2024-04-25 RX ORDER — LABETALOL HYDROCHLORIDE 5 MG/ML
5 INJECTION, SOLUTION INTRAVENOUS
Status: DISCONTINUED | OUTPATIENT
Start: 2024-04-25 | End: 2024-04-25 | Stop reason: HOSPADM

## 2024-04-25 RX ORDER — HALOPERIDOL 5 MG/ML
1 INJECTION INTRAMUSCULAR
Status: DISCONTINUED | OUTPATIENT
Start: 2024-04-25 | End: 2024-04-25 | Stop reason: HOSPADM

## 2024-04-25 RX ORDER — DIPHENHYDRAMINE HYDROCHLORIDE 50 MG/ML
12.5 INJECTION INTRAMUSCULAR; INTRAVENOUS
Status: DISCONTINUED | OUTPATIENT
Start: 2024-04-25 | End: 2024-04-25 | Stop reason: HOSPADM

## 2024-04-25 RX ORDER — DOCUSATE SODIUM 100 MG/1
100 CAPSULE, LIQUID FILLED ORAL 2 TIMES DAILY
Status: DISCONTINUED | OUTPATIENT
Start: 2024-04-25 | End: 2024-04-26 | Stop reason: HOSPADM

## 2024-04-25 RX ORDER — ASPIRIN 81 MG/1
81 TABLET ORAL 2 TIMES DAILY
Status: DISCONTINUED | OUTPATIENT
Start: 2024-04-25 | End: 2024-04-26 | Stop reason: HOSPADM

## 2024-04-25 RX ORDER — ENEMA 19; 7 G/133ML; G/133ML
1 ENEMA RECTAL
Status: DISCONTINUED | OUTPATIENT
Start: 2024-04-25 | End: 2024-04-26 | Stop reason: HOSPADM

## 2024-04-25 RX ORDER — ROCURONIUM BROMIDE 10 MG/ML
INJECTION, SOLUTION INTRAVENOUS PRN
Status: DISCONTINUED | OUTPATIENT
Start: 2024-04-25 | End: 2024-04-25 | Stop reason: SURG

## 2024-04-25 RX ORDER — POLYETHYLENE GLYCOL 3350 17 G/17G
1 POWDER, FOR SOLUTION ORAL 2 TIMES DAILY PRN
Status: DISCONTINUED | OUTPATIENT
Start: 2024-04-25 | End: 2024-04-26 | Stop reason: HOSPADM

## 2024-04-25 RX ORDER — OXYCODONE HCL 5 MG/5 ML
5 SOLUTION, ORAL ORAL
Status: DISCONTINUED | OUTPATIENT
Start: 2024-04-25 | End: 2024-04-25 | Stop reason: HOSPADM

## 2024-04-25 RX ORDER — CLOPIDOGREL BISULFATE 75 MG/1
75 TABLET ORAL DAILY
Status: DISCONTINUED | OUTPATIENT
Start: 2024-04-25 | End: 2024-04-26 | Stop reason: HOSPADM

## 2024-04-25 RX ORDER — ONDANSETRON 2 MG/ML
4 INJECTION INTRAMUSCULAR; INTRAVENOUS
Status: DISCONTINUED | OUTPATIENT
Start: 2024-04-25 | End: 2024-04-25 | Stop reason: HOSPADM

## 2024-04-25 RX ORDER — AMOXICILLIN 250 MG
1 CAPSULE ORAL
Status: DISCONTINUED | OUTPATIENT
Start: 2024-04-25 | End: 2024-04-26 | Stop reason: HOSPADM

## 2024-04-25 RX ORDER — BUDESONIDE AND FORMOTEROL FUMARATE DIHYDRATE 80; 4.5 UG/1; UG/1
2 AEROSOL RESPIRATORY (INHALATION) 2 TIMES DAILY
Status: DISCONTINUED | OUTPATIENT
Start: 2024-04-25 | End: 2024-04-25

## 2024-04-25 RX ORDER — SODIUM CHLORIDE, SODIUM LACTATE, POTASSIUM CHLORIDE, CALCIUM CHLORIDE 600; 310; 30; 20 MG/100ML; MG/100ML; MG/100ML; MG/100ML
INJECTION, SOLUTION INTRAVENOUS CONTINUOUS
Status: ACTIVE | OUTPATIENT
Start: 2024-04-25 | End: 2024-04-25

## 2024-04-25 RX ORDER — HYDRALAZINE HYDROCHLORIDE 20 MG/ML
5 INJECTION INTRAMUSCULAR; INTRAVENOUS
Status: DISCONTINUED | OUTPATIENT
Start: 2024-04-25 | End: 2024-04-25 | Stop reason: HOSPADM

## 2024-04-25 RX ORDER — SODIUM CHLORIDE, SODIUM LACTATE, POTASSIUM CHLORIDE, CALCIUM CHLORIDE 600; 310; 30; 20 MG/100ML; MG/100ML; MG/100ML; MG/100ML
INJECTION, SOLUTION INTRAVENOUS CONTINUOUS
Status: DISCONTINUED | OUTPATIENT
Start: 2024-04-25 | End: 2024-04-25 | Stop reason: HOSPADM

## 2024-04-25 RX ORDER — DIPHENHYDRAMINE HYDROCHLORIDE 50 MG/ML
25 INJECTION INTRAMUSCULAR; INTRAVENOUS EVERY 6 HOURS PRN
Status: DISCONTINUED | OUTPATIENT
Start: 2024-04-25 | End: 2024-04-26 | Stop reason: HOSPADM

## 2024-04-25 RX ORDER — DEXAMETHASONE SODIUM PHOSPHATE 4 MG/ML
4 INJECTION, SOLUTION INTRA-ARTICULAR; INTRALESIONAL; INTRAMUSCULAR; INTRAVENOUS; SOFT TISSUE
Status: DISCONTINUED | OUTPATIENT
Start: 2024-04-25 | End: 2024-04-26 | Stop reason: HOSPADM

## 2024-04-25 RX ORDER — ACETAMINOPHEN 500 MG
1000 TABLET ORAL ONCE
Status: COMPLETED | OUTPATIENT
Start: 2024-04-25 | End: 2024-04-25

## 2024-04-25 RX ORDER — DEXAMETHASONE SODIUM PHOSPHATE 4 MG/ML
INJECTION, SOLUTION INTRA-ARTICULAR; INTRALESIONAL; INTRAMUSCULAR; INTRAVENOUS; SOFT TISSUE PRN
Status: DISCONTINUED | OUTPATIENT
Start: 2024-04-25 | End: 2024-04-25 | Stop reason: SURG

## 2024-04-25 RX ORDER — CEFAZOLIN SODIUM 1 G/3ML
2 INJECTION, POWDER, FOR SOLUTION INTRAMUSCULAR; INTRAVENOUS ONCE
Status: COMPLETED | OUTPATIENT
Start: 2024-04-25 | End: 2024-04-25

## 2024-04-25 RX ORDER — ONDANSETRON 2 MG/ML
INJECTION INTRAMUSCULAR; INTRAVENOUS PRN
Status: DISCONTINUED | OUTPATIENT
Start: 2024-04-25 | End: 2024-04-25 | Stop reason: SURG

## 2024-04-25 RX ORDER — ROPIVACAINE HYDROCHLORIDE 5 MG/ML
INJECTION, SOLUTION EPIDURAL; INFILTRATION; PERINEURAL
Status: COMPLETED | OUTPATIENT
Start: 2024-04-25 | End: 2024-04-25

## 2024-04-25 RX ORDER — DULOXETIN HYDROCHLORIDE 30 MG/1
60 CAPSULE, DELAYED RELEASE ORAL DAILY
Status: DISCONTINUED | OUTPATIENT
Start: 2024-04-25 | End: 2024-04-26 | Stop reason: HOSPADM

## 2024-04-25 RX ORDER — HYDROMORPHONE HYDROCHLORIDE 1 MG/ML
0.2 INJECTION, SOLUTION INTRAMUSCULAR; INTRAVENOUS; SUBCUTANEOUS
Status: DISCONTINUED | OUTPATIENT
Start: 2024-04-25 | End: 2024-04-25 | Stop reason: HOSPADM

## 2024-04-25 RX ORDER — TRANEXAMIC ACID 100 MG/ML
INJECTION, SOLUTION INTRAVENOUS PRN
Status: DISCONTINUED | OUTPATIENT
Start: 2024-04-25 | End: 2024-04-25 | Stop reason: SURG

## 2024-04-25 RX ORDER — LORAZEPAM 0.5 MG/1
0.5 TABLET ORAL 2 TIMES DAILY
Status: DISCONTINUED | OUTPATIENT
Start: 2024-04-25 | End: 2024-04-26 | Stop reason: HOSPADM

## 2024-04-25 RX ORDER — PHENYLEPHRINE HCL IN 0.9% NACL 0.5 MG/5ML
SYRINGE (ML) INTRAVENOUS PRN
Status: DISCONTINUED | OUTPATIENT
Start: 2024-04-25 | End: 2024-04-25 | Stop reason: SURG

## 2024-04-25 RX ORDER — SCOLOPAMINE TRANSDERMAL SYSTEM 1 MG/1
1 PATCH, EXTENDED RELEASE TRANSDERMAL
Status: DISCONTINUED | OUTPATIENT
Start: 2024-04-25 | End: 2024-04-26 | Stop reason: HOSPADM

## 2024-04-25 RX ORDER — HYDROMORPHONE HYDROCHLORIDE 1 MG/ML
0.5 INJECTION, SOLUTION INTRAMUSCULAR; INTRAVENOUS; SUBCUTANEOUS
Status: DISCONTINUED | OUTPATIENT
Start: 2024-04-25 | End: 2024-04-26 | Stop reason: SINTOL

## 2024-04-25 RX ORDER — AMOXICILLIN 250 MG
1 CAPSULE ORAL NIGHTLY
Status: DISCONTINUED | OUTPATIENT
Start: 2024-04-25 | End: 2024-04-26 | Stop reason: HOSPADM

## 2024-04-25 RX ORDER — CELECOXIB 200 MG/1
200 CAPSULE ORAL 2 TIMES DAILY
Status: DISCONTINUED | OUTPATIENT
Start: 2024-04-25 | End: 2024-04-26 | Stop reason: HOSPADM

## 2024-04-25 RX ORDER — LIDOCAINE HYDROCHLORIDE 20 MG/ML
INJECTION, SOLUTION EPIDURAL; INFILTRATION; INTRACAUDAL; PERINEURAL PRN
Status: DISCONTINUED | OUTPATIENT
Start: 2024-04-25 | End: 2024-04-25 | Stop reason: SURG

## 2024-04-25 RX ORDER — ONDANSETRON 2 MG/ML
4 INJECTION INTRAMUSCULAR; INTRAVENOUS EVERY 4 HOURS PRN
Status: DISCONTINUED | OUTPATIENT
Start: 2024-04-25 | End: 2024-04-26 | Stop reason: HOSPADM

## 2024-04-25 RX ORDER — ALPRAZOLAM 0.25 MG/1
0.5 TABLET ORAL NIGHTLY PRN
Status: DISCONTINUED | OUTPATIENT
Start: 2024-04-25 | End: 2024-04-26 | Stop reason: HOSPADM

## 2024-04-25 RX ORDER — OXYCODONE HYDROCHLORIDE 5 MG/1
5 TABLET ORAL
Status: DISCONTINUED | OUTPATIENT
Start: 2024-04-25 | End: 2024-04-26 | Stop reason: SINTOL

## 2024-04-25 RX ORDER — ALBUTEROL SULFATE 90 UG/1
2 AEROSOL, METERED RESPIRATORY (INHALATION) EVERY 6 HOURS PRN
Status: DISCONTINUED | OUTPATIENT
Start: 2024-04-25 | End: 2024-04-26 | Stop reason: HOSPADM

## 2024-04-25 RX ORDER — VANCOMYCIN HYDROCHLORIDE 1 G/20ML
INJECTION, POWDER, LYOPHILIZED, FOR SOLUTION INTRAVENOUS
Status: COMPLETED | OUTPATIENT
Start: 2024-04-25 | End: 2024-04-25

## 2024-04-25 RX ORDER — HALOPERIDOL 5 MG/ML
1 INJECTION INTRAMUSCULAR EVERY 6 HOURS PRN
Status: DISCONTINUED | OUTPATIENT
Start: 2024-04-25 | End: 2024-04-26 | Stop reason: HOSPADM

## 2024-04-25 RX ORDER — BISACODYL 10 MG
10 SUPPOSITORY, RECTAL RECTAL
Status: DISCONTINUED | OUTPATIENT
Start: 2024-04-25 | End: 2024-04-26 | Stop reason: HOSPADM

## 2024-04-25 RX ADMIN — FENTANYL CITRATE 25 MCG: 50 INJECTION, SOLUTION INTRAMUSCULAR; INTRAVENOUS at 08:27

## 2024-04-25 RX ADMIN — Medication 50 MCG: at 08:09

## 2024-04-25 RX ADMIN — Medication 100 MCG: at 07:37

## 2024-04-25 RX ADMIN — MOMETASONE FUROATE AND FORMOTEROL FUMARATE DIHYDRATE 2 PUFF: 100; 5 AEROSOL RESPIRATORY (INHALATION) at 20:05

## 2024-04-25 RX ADMIN — Medication 50 MCG: at 08:00

## 2024-04-25 RX ADMIN — SODIUM CHLORIDE, POTASSIUM CHLORIDE, SODIUM LACTATE AND CALCIUM CHLORIDE: 600; 310; 30; 20 INJECTION, SOLUTION INTRAVENOUS at 07:23

## 2024-04-25 RX ADMIN — Medication 100 MCG: at 07:50

## 2024-04-25 RX ADMIN — CLOPIDOGREL BISULFATE 75 MG: 75 TABLET ORAL at 18:02

## 2024-04-25 RX ADMIN — EPHEDRINE SULFATE 2.5 MG: 50 INJECTION, SOLUTION INTRAVENOUS at 07:58

## 2024-04-25 RX ADMIN — EPHEDRINE SULFATE 5 MG: 50 INJECTION, SOLUTION INTRAVENOUS at 08:21

## 2024-04-25 RX ADMIN — Medication 100 MCG: at 07:45

## 2024-04-25 RX ADMIN — ONDANSETRON 4 MG: 2 INJECTION INTRAMUSCULAR; INTRAVENOUS at 08:21

## 2024-04-25 RX ADMIN — Medication 100 MCG: at 07:43

## 2024-04-25 RX ADMIN — SENNOSIDES AND DOCUSATE SODIUM 1 TABLET: 50; 8.6 TABLET ORAL at 20:54

## 2024-04-25 RX ADMIN — GABAPENTIN 300 MG: 300 CAPSULE ORAL at 20:54

## 2024-04-25 RX ADMIN — DOCUSATE SODIUM 100 MG: 100 CAPSULE, LIQUID FILLED ORAL at 18:00

## 2024-04-25 RX ADMIN — EPHEDRINE SULFATE 5 MG: 50 INJECTION, SOLUTION INTRAVENOUS at 07:42

## 2024-04-25 RX ADMIN — LORAZEPAM 0.5 MG: 0.5 TABLET ORAL at 18:33

## 2024-04-25 RX ADMIN — PROPOFOL 30 MG: 10 INJECTION, EMULSION INTRAVENOUS at 07:33

## 2024-04-25 RX ADMIN — ROCURONIUM BROMIDE 50 MG: 50 INJECTION, SOLUTION INTRAVENOUS at 07:32

## 2024-04-25 RX ADMIN — TRANEXAMIC ACID 1000 MG: 100 INJECTION, SOLUTION INTRAVENOUS at 08:19

## 2024-04-25 RX ADMIN — OXYCODONE HYDROCHLORIDE 5 MG: 5 TABLET ORAL at 17:58

## 2024-04-25 RX ADMIN — PROPOFOL 90 MG: 10 INJECTION, EMULSION INTRAVENOUS at 07:32

## 2024-04-25 RX ADMIN — LIDOCAINE HYDROCHLORIDE 50 MG: 20 INJECTION, SOLUTION EPIDURAL; INFILTRATION; INTRACAUDAL at 07:32

## 2024-04-25 RX ADMIN — SUGAMMADEX 200 MG: 100 INJECTION, SOLUTION INTRAVENOUS at 08:29

## 2024-04-25 RX ADMIN — CEFAZOLIN 2 G: 1 INJECTION, POWDER, FOR SOLUTION INTRAMUSCULAR; INTRAVENOUS at 07:36

## 2024-04-25 RX ADMIN — CELECOXIB 200 MG: 200 CAPSULE ORAL at 18:02

## 2024-04-25 RX ADMIN — DEXAMETHASONE SODIUM PHOSPHATE 8 MG: 4 INJECTION INTRA-ARTICULAR; INTRALESIONAL; INTRAMUSCULAR; INTRAVENOUS; SOFT TISSUE at 07:32

## 2024-04-25 RX ADMIN — QUETIAPINE FUMARATE 50 MG: 25 TABLET ORAL at 20:54

## 2024-04-25 RX ADMIN — Medication 100 MCG: at 07:40

## 2024-04-25 RX ADMIN — Medication 100 MCG: at 08:13

## 2024-04-25 RX ADMIN — PROPOFOL 30 MG: 10 INJECTION, EMULSION INTRAVENOUS at 07:34

## 2024-04-25 RX ADMIN — Medication 100 MCG: at 08:21

## 2024-04-25 RX ADMIN — ACETAMINOPHEN 1000 MG: 500 TABLET, FILM COATED ORAL at 06:55

## 2024-04-25 RX ADMIN — METOPROLOL TARTRATE 12.5 MG: 25 TABLET, FILM COATED ORAL at 18:01

## 2024-04-25 RX ADMIN — Medication 50 MCG: at 08:04

## 2024-04-25 RX ADMIN — Medication 50 MCG: at 07:56

## 2024-04-25 RX ADMIN — FENTANYL CITRATE 25 MCG: 50 INJECTION, SOLUTION INTRAMUSCULAR; INTRAVENOUS at 07:31

## 2024-04-25 RX ADMIN — EPHEDRINE SULFATE 5 MG: 50 INJECTION, SOLUTION INTRAVENOUS at 08:15

## 2024-04-25 RX ADMIN — ROPIVACAINE HYDROCHLORIDE 15 ML: 5 INJECTION EPIDURAL; INFILTRATION; PERINEURAL at 07:16

## 2024-04-25 RX ADMIN — ASPIRIN 81 MG: 81 TABLET, COATED ORAL at 18:00

## 2024-04-25 RX ADMIN — TRANEXAMIC ACID 1000 MG: 100 INJECTION, SOLUTION INTRAVENOUS at 07:37

## 2024-04-25 ASSESSMENT — PAIN DESCRIPTION - PAIN TYPE
TYPE: SURGICAL PAIN
TYPE: CHRONIC PAIN

## 2024-04-25 ASSESSMENT — PATIENT HEALTH QUESTIONNAIRE - PHQ9
1. LITTLE INTEREST OR PLEASURE IN DOING THINGS: NOT AT ALL
SUM OF ALL RESPONSES TO PHQ9 QUESTIONS 1 AND 2: 0
2. FEELING DOWN, DEPRESSED, IRRITABLE, OR HOPELESS: NOT AT ALL

## 2024-04-25 ASSESSMENT — FIBROSIS 4 INDEX
FIB4 SCORE: 1.86
FIB4 SCORE: 1.86

## 2024-04-25 NOTE — H&P
"Surgery Orthopedic History & Physical Note    Date  4/25/2024    Primary Care Physician  Thom Rodriguez M.D.    CC  Pre-Op Diagnosis Codes:     * Left shoulder pain [M25.512]     * Osteoarthritis of left shoulder [M19.012]    HPI  This is a 88 y.o. female who presented with left shoulder pain secondary to advanced rotator cuff arthropathy.  She elects to undergo a left reverse TSA.    Past Medical History:   Diagnosis Date    Anemia     chronic    Anesthesia     \"hard to wake up\"    Arthritis     hands, states RA and Osteo    Asthma     uses inhaler    Backpain     compressed discs    Breath shortness     oxygen 2L N/C prn    CATARACT     Clostridium difficile infection 01/01/2009    Coarse tremors     Colitis     Dental disorder     chronic gum infection, dentures upper and lower    Fibromyalgia     Fibromyalgia, primary     Gastritis     Glaucoma     Heart burn     Heart disease     Hiatus hernia syndrome     High cholesterol     History of pulmonary embolism - 2009 after knee surgery     2009 after knee arthroscopy.    Indigestion     Myocardial infarct (HCC) 05/01/2015    silent MI    Osteoporosis     Other specified disorder of intestines     colitis, bloating, constipation    Pain     L shoulder pain 8/10    Pneumonia     4/4/2011    Psychiatric problem     anxiety/depression    Pulmonary embolism (HCC)     left lung    Sleep apnea     no cpap uses 3L nc only at night    Stroke (HCC) 01/01/2009    right weakness    Supplemental oxygen dependent     Urinary bladder disorder     hematuria chronic       Past Surgical History:   Procedure Laterality Date    RECOVERY  06/11/2015    Procedure:  CATH LAB  [facility] WITH POSS. SILVINO ICD; 794.30;  Surgeon: Bruno Surgery;  Location: SURGERY PRE-POST PROC UNIT [facility];  Service:     KNEE ARTHROSCOPY  10/08/2009    Performed by JAYSHREE VIVEROS at [facility] ORS    MENISCECTOMY, KNEE, MEDIAL  10/08/2009    Performed by JAYSHREE VIVEROS at [facility] ORS    MENISCECTOMY  10/08/2009    Performed by JAYSHREE VIVEROS at SURGERY [facility] ORS    ALHAJI BY LAPAROSCOPY  04/19/2009    Performed by GANSER, JOHN H at SURGERY [facility] ORS    ROTATOR CUFF REPAIR  01/01/2000    left    HYSTERECTOMY, TOTAL ABDOMINAL  01/01/1973    CATARACT EXTRACTION WITH IOL Bilateral     ORIF, HIP  12/25/22       Current Facility-Administered Medications   Medication Dose Route Frequency Provider Last Rate Last Admin    acetaminophen (Tylenol) tablet 1,000 mg  1,000 mg Oral Once Johanna Simon M.D.        ceFAZolin (Ancef) injection 2 g  2 g Intravenous Once Jennifer Matute M.D.        lidocaine (Xylocaine) 1 % injection 0.5 mL  0.5 mL Intradermal Once PRN Jennifer Matute M.D.        lactated ringers infusion   Intravenous Continuous Jennifer Matute M.D.           Social History     Socioeconomic History    Marital status:      Spouse name: Not on file    Number of children: Not on file    Years of education: Not on file    Highest education level: Not on file   Occupational History    Not on file   Tobacco Use    Smoking status: Never    Smokeless tobacco: Never    Tobacco comments:     As a teenager   Vaping Use    Vaping Use: Never used   Substance and Sexual Activity    Alcohol use: No    Drug use: No    Sexual activity: Not on file   Other Topics Concern    Not on file   Social History Narrative    Not on file     Social Determinants of Health     Financial Resource Strain: Not on file   Food Insecurity: Not on file   Transportation Needs: Not on file   Physical Activity: Not on file   Stress: Not on file   Social Connections: Not on file   Intimate Partner Violence: Not on file   Housing Stability: Not on file       Family History   Problem Relation Age of Onset    Anesthesia Father         difficulty waking post anesthesia    Diabetes Other     Heart Disease Other     Hypertension Other     Stroke Other     Cancer Other        Allergies  Contrast media with iodine  [iodine] and Tequin [gatifloxacin sesquihydrate]    Review of Systems  Negative    Physical Exam  Lungs:  CTA bilat  CV:  RRR  Left shoulder:  pain and weakness  Vital Signs  Blood Pressure : (!) 173/80   Temperature: (!) 35.6 °C (96.1 °F)   Pulse: 83   Respiration: 16           Labs:  Recent Labs     04/23/24  1357   WBC 6.2   RBC 4.04*   HEMOGLOBIN 11.8*   HEMATOCRIT 36.5*   MCV 90.3   MCH 29.2   MCHC 32.3   RDW 47.2   PLATELETCT 257   MPV 10.7     Recent Labs     04/23/24  1357   SODIUM 141   POTASSIUM 4.5   CHLORIDE 106   CO2 24   GLUCOSE 103*   BUN 19   CREATININE 0.70   CALCIUM 9.7               Radiology:  No orders to display         Assessment/Plan:  Pre-Op Diagnosis Codes:     * Left shoulder pain [M25.512]     * Osteoarthritis of left shoulder [M19.012]  Procedure(s):  ARTHROPLASTY, SHOULDER, TOTAL, REVERSE, LEFT

## 2024-04-25 NOTE — OR NURSING
Late entry: Patient allergies and NPO status verified, home medication reconciliation completed and belongings secured. Surgical site verified with patient. Patient verbalizes understanding of pain scale, expected course of stay and plan of care; patient and family state verbal understanding at this time. IV access established. Sequentials/teds in place as ordered.     2 assist with gait belt into pre-op with daughters. Vietnamese speaking only. Pt has sleep apnea but does not use CPAP; uses 3 L NC at night and PRN during day if SOB.     Post op prescription picked up and at home.     Pt has hx of stroke with Rt sided weakness; pt able has equal  strength to BUE and able to lift both legs off bed and hold when asked to lift right leg.

## 2024-04-25 NOTE — DISCHARGE INSTRUCTIONS
Ice prn.  Keep arm in Ultrasling. Call for questions or concerns.  Ok to start showering in 2 days. Keep original dressing in place.   Follow up as scheduled. Start physical therapy within 5 days. May come out of brace for showering, PT, and Codman Exercises. No active Range of Motion, reaching, grabbing, pushing, pulling with the operative arm x 6 weeks.

## 2024-04-25 NOTE — ANESTHESIA PROCEDURE NOTES
Airway    Date/Time: 4/25/2024 7:34 AM    Performed by: Johanna Simon M.D.  Authorized by: Johanna Simon M.D.    Location:  OR  Urgency:  Elective  Indications for Airway Management:  Anesthesia      Spontaneous Ventilation: absent    Sedation Level:  Deep  Preoxygenated: Yes    Patient Position:  Sniffing  Mask Difficulty Assessment:  2 - vent by mask + OA or adjuvant +/- NMBA  Final Airway Type:  Endotracheal airway  Final Endotracheal Airway:  ETT  Cuffed: Yes    Technique Used for Successful ETT Placement:  Direct laryngoscopy    Insertion Site:  Oral  Blade Type:  Melvi  Laryngoscope Blade/Videolaryngoscope Blade Size:  3  ETT Size (mm):  6.5  Measured from:  Gums  ETT to Gums (cm):  22  Placement Verified by: auscultation and capnometry    Cormack-Lehane Classification:  Grade I - full view of glottis  Number of Attempts at Approach:  1

## 2024-04-25 NOTE — OP REPORT
DATE OF SERVICE: 4/25/24    PREOPERATIVE DIAGNOSIS: left shoulder advanced rotator cuff arthropathy.    POSTOPERATIVE DIAGNOSIS: left shoulder advanced rotator cuff arthropathy.    PROCEDURE PERFORMED: left reverse total shoulder arthroplasty     SURGEON: José Luis Matute MD    ASSISTANT: DEX Brooke    ANESTHESIA: General with interscalene block for posterior pain control.     ANESTHESIOLOGIST: MD Adore    ANTIBIOTICS: Ancef    COMPLICATIONS: None.     IMPLANTS: Tornier reverse total shoulder arthroplasty with size 3B press fit stem, size 25 baseplate, 36 glenoid, +0 high offset tray with a +6 insert.     INDICATIONS: The patient presents with left shoulder pain recalcitrant to conservative treatment. The patient has evidence of a left advanced rotator cuff arthropathy, high-riding humeral head, and extensive arthritis. Options were discussed, including both non operative and operative treatments. Risks of surgery were discussed at length. All questions were answered. No guarantees were given. The patient elected to proceed with a reverse total shoulder arthroplasty.    PROCEDURE IN DETAIL: The patient was properly identified in the preoperative holding area, and the left shoulder was marked as the correct surgical site. The patient was then taken back to the operative room, and placed supine on the operating room table and underwent general anesthesia. The patient was placed in a beach chair position. The left upper extremity was sterilely prepped and draped in standard fashion.     A standard deltopectoral approach was created. The cephalic vein was identified and retracted latereraly.  The joint was opened and there was extensive rotator cuff arthropathy. A rongeur was used to remove bone spurs and a saw cut was made. Hand reaming and then hand broaching up to a size 3B was performed. The protector was used and the the anterior and posterior glenoid retractors were placed. The labrum was excised. The  center drill whole was made followed by hand reaming centrally and then peripherally, and then the baseplate was inserted. Non locking screws were placed anteriorly and posteriorly and then locking screw were placed superiorly and inferiorly. It was nice and stable. The glenosphere was placed and then trialed. Excellent stability and excellent tension with a +6 insert was achieved. Trial components were removed, the canal was irrigated, and the canal looked good. The implant was inserted, and again retrialed. The shoulder was reduced and motion was checked again, which was very stable with good tension. The shoulder was soaked with a betadine and saline solution. The skin was closed with 0 vicryl, then 2-0 vicryl and staples on the skin. All counts were correct. Soft dressings were applied. The sling was placed. The patient was extubated and transferred to the recovery room in stable condition.

## 2024-04-25 NOTE — ANESTHESIA PROCEDURE NOTES
Peripheral Block    Date/Time: 4/25/2024 7:16 AM    Performed by: Johanna Simon M.D.  Authorized by: Johanna Simon M.D.    Start Time:  4/25/2024 7:16 AM  Reason for Block: at surgeon's request and post-op pain management ONLY    patient identified, IV checked, site marked, risks and benefits discussed, surgical consent, monitors and equipment checked, pre-op evaluation and timeout performed    Patient Position:  Supine  Prep: ChloraPrep    Monitoring:  Heart rate, continuous pulse ox and cardiac monitor  Block Region:  Upper Extremity  Upper Extremity - Block Type:  BRACHIAL PLEXUS block, Interscalene approach    Laterality:  Left  Procedures: ultrasound guided  Image captured, interpreted and electronically stored.  Local Infiltration:  Lidocaine  Strength:  1 %  Dose:  3 ml  Block Type:  Single-shot  Needle Length:  50mm  Needle Gauge:  22 G  Needle Localization:  Ultrasound guidance  Ultrasound picture in chart  Injection Assessment:  Negative aspiration for heme, no paresthesia on injection, incremental injection and local visualized surrounding nerve on ultrasound  Evidence of intravascular injection: No

## 2024-04-25 NOTE — LETTER
January 11, 2024    Patient Name: Navya Mcbride  Surgeon Name: José Luis Matute M.D.  Surgery Facility: [facility] ([address] [facility])  Surgery Date: 2/1/2024    The time of your surgery is not final and may change up to and until the day of your surgery. You will be contacted 24-48 hours prior to your surgery date with your check-in and surgery time.    If you will not be at one of the below numbers please call the surgery scheduler at [phone]  Preferred Phone: [phone]    BEFORE YOUR SURGERY   Pre Registration and/or Lab Work must be done within and no earlier than 28 days prior to your surgery date. Your scheduled facility will contact you regarding all required preregistration and/or lab work. If you have not been contacted within 7 days of your scheduled procedure please call [facility] at [phone] for an appointment as soon as possible.    Pre op Appointment:   Date: 01/24/24   Time: 9:15AM   Provider: José Luis Matute MD   Location: [address] [place] [id number]  Instructions: Bring a list of all medications you are taking including the dosing and frequency.    DAY OF YOUR SURGERY  Nothing to eat or drink after midnight     Refrain from smoking any substance after midnight prior to surgery. Smoking may interfere with the anesthetic and frequently produces nausea during the recovery period.    Continue taking all lifesaving medications. Including the morning of your surgery with small sip of water.    Please do NOT take on the day of surgery:  Diuretics: examples- furosemide (Lasix), spironolactone, hydrochlorothiazide  ACE-inhibitors: examples- lisinopril, ramipril, enalapril  “ARBs”: examples- losartan, Olmesartan, valsartan    Please arrive at the hospital/surgery center at the check-in time provided.     An adult will need to bring you and take you home after your surgery.         AFTER YOUR SURGERY  Post op  Appointment:   Date: 02/16/24   Time: 9:15AM   With: José Luis Matute MD   Location: [address] [place] [facility] [place] [address]    - Therapy- Your first appointment should be 5  day(s) after your surgery. For your convenience we have 4 Physical Therapy locations: [place], [facility], [place], and [facility]. Call our office ASAP to schedule an appointment at [phone] or take the enclosed Therapy Prescription to a facility of your choice.  - Post Surgery - You will need someone to drive you home  - Post Surgery - You will need someone to stay with you for 24 hours     TIME OFF WORK  FMLA or Disability forms can be faxed directly to: [phone] or you may drop them off at [address] [place] [facility] [address]. Our office charges a $35.00 fee per form. Forms will be completed within 10 business days of drop off and payment received. For the status of your forms you may contact our disability office directly [phone].    MEDICATION INSTRUCTIONS **Please read section completely**  The following medications should be stopped a minimum of 10 days prior to surgery:  All over the counter, Supplements & Herbal medications    Anorectics: Phentermine (Adipex-P, Lomaira and Suprenza), Phentermine-topiramate (Qsymia), Bupropion-naltrexone (Contrave)    **If you are on Bupropion for anxiety/depression, please continue this medication up until the day of surgery.     Opiod Partial Agonists/Opioid Antagonists: Buprenorphine (Suboxone, Belbuca, Butrans, Probuphine Implant, Sublocade), Naltrexone (ReVia, Vivitrol), Naloxone    Amphetamines: Dextroamphetamine/Amphetamine (Adderall, Mydayis), Methylphenidate Hydrochloride (Concerta, Metadate, Methylin, Ritalin)    The following medications should be stopped 5 days prior to surgery:  Blood Thinners: Any Aspirin, Aspirin products, anti-inflammatories such as ibuprofen and any blood thinners such as Coumadin and Plavix. Please consult your prescribing physician if you are on life  saving blood thinners, in regards to when to stop medications prior to surgery.     The following medications should be stopped a minimum of 3 days prior to surgery:  PDE-5 inhibitors: Sildenafil (Viagra), Tadalafil (Cialis), Vardenafil (Levitra), Avanafil (Stendra)    MAO Inhibitors: Rasagiline (Azilect), Selegiline (Eldepryl, Emsam, Selapar), Isocarboxazid (Marplan), Phenelzine (Nardil)

## 2024-04-25 NOTE — ANESTHESIA PREPROCEDURE EVALUATION
Case: [id number] Date/Time: 04/25/24 0645    Procedure: ARTHROPLASTY, SHOULDER, TOTAL, REVERSE, LEFT    Diagnosis:       Left shoulder pain [M25.512]      Osteoarthritis of left shoulder [M19.012]    Pre-op diagnosis: Left shoulder pain [M25.512] Osteoarthritis of left shoulder [M19.012]    Location:  OR 03 / SURGERY [facility]    Surgeons: José Luis Matute M.D.            Relevant Problems   ANESTHESIA   (positive) Delayed emergence from anesthesia      PULMONARY   (positive) COPD with asthma (HCC)      NEURO   (positive) CVA (cerebral vascular accident) (HCC)      GI   (positive) GERD (gastroesophageal reflux disease)      Other   (positive) Abnormal myocardial perfusion study   (positive) Fibromyalgia   (positive) H/O: stroke   (positive) History of pulmonary embolism - 2009 after knee surgery       Physical Exam    Airway   Mallampati: II  TM distance: >3 FB  Neck ROM: full       Cardiovascular - normal exam  Rhythm: regular  Rate: normal  (-) murmur     Dental - normal exam  (+) upper dentures, lower dentures           Pulmonary - normal exam  Breath sounds clear to auscultation     Abdominal    Neurological - normal exam                   Anesthesia Plan    ASA 3   ASA physical status 3 criteria: CVA or TIA - history (> 3 months) and CAD/stents (> 3 months)    Plan - general and peripheral nerve block     Peripheral nerve block will be post-op pain control  Airway plan will be ETT          Induction: intravenous    Postoperative Plan: Postoperative administration of opioids is intended.    Pertinent diagnostic labs and testing reviewed    Informed Consent:    Anesthetic plan and risks discussed with patient.    Use of blood products discussed with: patient whom consented to blood products.

## 2024-04-25 NOTE — ANESTHESIA POSTPROCEDURE EVALUATION
Patient: Navya Mcbride    Procedure Summary       Date: 04/25/24 Room / Location:  OR 03 / SURGERY [facility]    Anesthesia Start: 0723 Anesthesia Stop: 0841    Procedure: ARTHROPLASTY, SHOULDER, TOTAL, REVERSE, LEFT (Left: Shoulder) Diagnosis:       Left shoulder pain      Osteoarthritis of left shoulder      (Left shoulder pain, Osteoarthritis of left shoulder)    Surgeons: José Luis Matute M.D. Responsible Provider: Johanna Simon M.D.    Anesthesia Type: general, peripheral nerve block ASA Status: 3            Final Anesthesia Type: general, peripheral nerve block  Last vitals  BP   Blood Pressure : 111/52    Temp   36.1 °C (97 °F)    Pulse   82   Resp   18    SpO2   95 %      Anesthesia Post Evaluation    Patient location during evaluation: PACU  Patient participation: complete - patient participated  Level of consciousness: awake and alert    Airway patency: patent  Anesthetic complications: no  Cardiovascular status: hemodynamically stable  Respiratory status: acceptable  Hydration status: euvolemic    PONV: none          There were no known notable events for this encounter.     Nurse Pain Score: 0 (NPRS)

## 2024-04-25 NOTE — ANESTHESIA TIME REPORT
Anesthesia Start and Stop Event Times       Date Time Event    4/25/2024 0658 Ready for Procedure     0723 Anesthesia Start     0841 Anesthesia Stop          Responsible Staff  04/25/24      Name Role Begin End    Johanna Simon M.D. Anesth 0723 0841          Overtime Reason:  no overtime (within assigned shift)    Comments:

## 2024-04-25 NOTE — LETTER
April 10, 2024    Patient Name: Navya Mcbride  Surgeon Name: José Luis Matute M.D.  Surgery Facility: [facility] ([address] [facility])  Surgery Date: 4/25/2024    The time of your surgery is not final and may change up to and until the day of your surgery. You will be contacted 24-48 hours prior to your surgery date with your check-in and surgery time.    If you will not be at one of the below numbers please call the surgery scheduler at [phone]  Preferred Phone: [phone]    BEFORE YOUR SURGERY   Pre Registration and/or Lab Work must be done within and no earlier than 28 days prior to your surgery date. Your scheduled facility will contact you regarding all required preregistration and/or lab work. If you have not been contacted within 7 days of your scheduled procedure please call [facility] at [phone] for an appointment as soon as possible.    Pre op Appointment:   Date: 04/19/24   Time: 10:15am   Provider: José Luis Matute MD   Location: [address] [place] [address]  Instructions: Bring a list of all medications you are taking including the dosing and frequency.    DAY OF YOUR SURGERY  Nothing to eat or drink after midnight     Refrain from smoking any substance after midnight prior to surgery. Smoking may interfere with the anesthetic and frequently produces nausea during the recovery period.    Continue taking all lifesaving medications. Including the morning of your surgery with small sip of water.    Please do NOT take on the day of surgery:  Diuretics: examples- furosemide (Lasix), spironolactone, hydrochlorothiazide  ACE-inhibitors: examples- lisinopril, ramipril, enalapril  “ARBs”: examples- losartan, Olmesartan, valsartan    Please arrive at the hospital/surgery center at the check-in time provided.     An adult will need to bring you and take you home after your surgery.         AFTER YOUR SURGERY  Post op  Appointment:   Date: 05/10/24   Time: 10:15AM   With: José Luis Matute MD   Location: [address] [place] [facility] [place] [facility] [address]    - Therapy- Your first appointment should be 5  day(s) after your surgery. For your convenience we have 4 Physical Therapy locations: [place], [facility], [place], and [facility]. Call our office ASAP to schedule an appointment at [phone] or take the enclosed Therapy Prescription to a facility of your choice.  - Post Surgery - You will need someone to drive you home  - Post Surgery - You will need someone to stay with you for 24 hours     TIME OFF WORK  FMLA or Disability forms can be faxed directly to: [phone] or you may drop them off at [address] [place] [facility] [place] [facility] [address]. Our office charges a $35.00 fee per form. Forms will be completed within 10 business days of drop off and payment received. For the status of your forms you may contact our disability office directly [phone].    MEDICATION INSTRUCTIONS **Please read section completely**  The following medications should be stopped a minimum of 10 days prior to surgery:  All over the counter, Supplements & Herbal medications    Anorectics: Phentermine (Adipex-P, Lomaira and Suprenza), Phentermine-topiramate (Qsymia), Bupropion-naltrexone (Contrave)    **If you are on Bupropion for anxiety/depression, please continue this medication up until the day of surgery.     Opiod Partial Agonists/Opioid Antagonists: Buprenorphine (Suboxone, Belbuca, Butrans, Probuphine Implant, Sublocade), Naltrexone (ReVia, Vivitrol), Naloxone    Amphetamines: Dextroamphetamine/Amphetamine (Adderall, Mydayis), Methylphenidate Hydrochloride (Concerta, Metadate, Methylin, Ritalin)    The following medications should be stopped 5 days prior to surgery:  Blood Thinners: Any Aspirin, Aspirin products, anti-inflammatories such as ibuprofen and any blood thinners such as Coumadin and Plavix. Please consult your prescribing physician if you are on life  saving blood thinners, in regards to when to stop medications prior to surgery.     The following medications should be stopped a minimum of 3 days prior to surgery:  PDE-5 inhibitors: Sildenafil (Viagra), Tadalafil (Cialis), Vardenafil (Levitra), Avanafil (Stendra)    MAO Inhibitors: Rasagiline (Azilect), Selegiline (Eldepryl, Emsam, Selapar), Isocarboxazid (Marplan), Phenelzine (Nardil)

## 2024-04-25 NOTE — OR NURSING
0838: Patient arrived to PACU. Respirations even and unlabored. VSS. Dressing clean, dry and intact. Oral airway in place. +Pedal/radial pulses.    0845: Remains sedated, does not rouses to verbal stimuli, respirations even and unlabored, oral airway remains in place    0900: Remains sedated, does not rouses to verbal stimuli, respirations even and unlabored, oral airway remains in place    0915: Eyes opened spontaneously, able to follow commands, oral airway removed    0930: Sleeping and rouses to verbal stimuli, respirations even and unlabored    0945: Patient resting quietly, respirations even and unlabored, denies pain/nausea. Rouses to verbal stimuli and returns to sleep after answering questions.     1000: Sleeping and rouses to verbal stimuli, respirations even and unlabored    1015: No change in status, respirations even and unlabored, denies pain/nausea    1030: trial on room air while patient was awake and O2 dropped to 76%, placed on 4L NC and slowly improved to 94%    1045: Patient resting quietly, respirations even and unlabored, denies pain/nausea.    1100: No change in status, respirations even and unlabored, denies pain/nausea    1115: Sleeping and rouses to verbal stimuli, respirations even and unlabored    1130: denies pain/nausea, respirations even and unlabored, awaiting bed assignment    1145: No change in status, respirations even and unlabored, pain remains tolerable, denies nausea    1200: Sleeping and rouses to verbal stimuli, respirations even and unlabored    1230: Used  to verify patient understands plan of care, denies pain/nausea, respirations even and unlabored, family updated.    1300: Report given to Imelda HENDRICKSON, no change in status, respirations even and unlabored, denies pain/nausea.    1329: Transported to U

## 2024-04-25 NOTE — LETTER
April 9, 2024    Patient Name: Navya Mcbride  Surgeon Name: José Luis Matute M.D.  Surgery Facility: [facility] ([address] [facility])  Surgery Date: 5/30/2024    The time of your surgery is not final and may change up to and until the day of your surgery. You will be contacted 24-48 hours prior to your surgery date with your check-in and surgery time.    If you will not be at one of the below numbers please call the surgery scheduler at [phone]  Preferred Phone: [phone]    BEFORE YOUR SURGERY   Pre Registration and/or Lab Work must be done within and no earlier than 28 days prior to your surgery date. Your scheduled facility will contact you regarding all required preregistration and/or lab work. If you have not been contacted within 7 days of your scheduled procedure please call [facility] at [phone] for an appointment as soon as possible.    Pre op Appointment:   Date: 05/22/24   Time: 10:00AM   Provider: José Luis Matute MD   Location: [address] [place] [id number]  Instructions: Bring a list of all medications you are taking including the dosing and frequency.    DAY OF YOUR SURGERY  Nothing to eat or drink after midnight     Refrain from smoking any substance after midnight prior to surgery. Smoking may interfere with the anesthetic and frequently produces nausea during the recovery period.    Continue taking all lifesaving medications. Including the morning of your surgery with small sip of water.    Please do NOT take on the day of surgery:  Diuretics: examples- furosemide (Lasix), spironolactone, hydrochlorothiazide  ACE-inhibitors: examples- lisinopril, ramipril, enalapril  “ARBs”: examples- losartan, Olmesartan, valsartan    Please arrive at the hospital/surgery center at the check-in time provided.     An adult will need to bring you and take you home after your surgery.         AFTER YOUR SURGERY  Post op  Appointment:   Date: 06/14/24   Time: 10:00AM   With: José Luis Matute MD   Location: [address] [place] [address]    - Therapy- Your first appointment should be 5  day(s) after your surgery. For your convenience we have 4 Physical Therapy locations: [place], [facility], [place], and [facility]. Call our office ASAP to schedule an appointment at [phone] or take the enclosed Therapy Prescription to a facility of your choice.  - Post Surgery - You will need someone to drive you home  - Post Surgery - You will need someone to stay with you for 24 hours     TIME OFF WORK  FMLA or Disability forms can be faxed directly to: [phone] or you may drop them off at [address] [place] [place] [address]. Our office charges a $35.00 fee per form. Forms will be completed within 10 business days of drop off and payment received. For the status of your forms you may contact our disability office directly [phone].    MEDICATION INSTRUCTIONS **Please read section completely**  The following medications should be stopped a minimum of 10 days prior to surgery:  All over the counter, Supplements & Herbal medications    Anorectics: Phentermine (Adipex-P, Lomaira and Suprenza), Phentermine-topiramate (Qsymia), Bupropion-naltrexone (Contrave)    **If you are on Bupropion for anxiety/depression, please continue this medication up until the day of surgery.     Opiod Partial Agonists/Opioid Antagonists: Buprenorphine (Suboxone, Belbuca, Butrans, Probuphine Implant, Sublocade), Naltrexone (ReVia, Vivitrol), Naloxone    Amphetamines: Dextroamphetamine/Amphetamine (Adderall, Mydayis), Methylphenidate Hydrochloride (Concerta, Metadate, Methylin, Ritalin)    The following medications should be stopped 5 days prior to surgery:  Blood Thinners: Any Aspirin, Aspirin products, anti-inflammatories such as ibuprofen and any blood thinners such as Coumadin and Plavix. Please consult your prescribing physician if you are on life  saving blood thinners, in regards to when to stop medications prior to surgery.     The following medications should be stopped a minimum of 3 days prior to surgery:  PDE-5 inhibitors: Sildenafil (Viagra), Tadalafil (Cialis), Vardenafil (Levitra), Avanafil (Stendra)    MAO Inhibitors: Rasagiline (Azilect), Selegiline (Eldepryl, Emsam, Selapar), Isocarboxazid (Marplan), Phenelzine (Nardil)

## 2024-04-26 ENCOUNTER — PATIENT OUTREACH (OUTPATIENT)
Dept: SCHEDULING | Facility: IMAGING CENTER | Age: 89
End: 2024-04-26
Payer: MEDICARE

## 2024-04-26 VITALS
RESPIRATION RATE: 18 BRPM | HEART RATE: 105 BPM | HEIGHT: 62 IN | DIASTOLIC BLOOD PRESSURE: 60 MMHG | SYSTOLIC BLOOD PRESSURE: 152 MMHG | OXYGEN SATURATION: 95 % | WEIGHT: 138.56 LBS | TEMPERATURE: 97.5 F | BODY MASS INDEX: 25.5 KG/M2

## 2024-04-26 PROCEDURE — 97166 OT EVAL MOD COMPLEX 45 MIN: CPT

## 2024-04-26 PROCEDURE — 700102 HCHG RX REV CODE 250 W/ 637 OVERRIDE(OP): Performed by: ORTHOPAEDIC SURGERY

## 2024-04-26 PROCEDURE — 97162 PT EVAL MOD COMPLEX 30 MIN: CPT

## 2024-04-26 PROCEDURE — 770030 HCHG ROOM/CARE - EXTENDED RECOVERY EACH 15 MIN

## 2024-04-26 PROCEDURE — 97535 SELF CARE MNGMENT TRAINING: CPT

## 2024-04-26 PROCEDURE — 94760 N-INVAS EAR/PLS OXIMETRY 1: CPT

## 2024-04-26 PROCEDURE — A9270 NON-COVERED ITEM OR SERVICE: HCPCS | Performed by: ORTHOPAEDIC SURGERY

## 2024-04-26 RX ADMIN — CLOPIDOGREL BISULFATE 75 MG: 75 TABLET ORAL at 05:31

## 2024-04-26 RX ADMIN — CELECOXIB 200 MG: 200 CAPSULE ORAL at 05:30

## 2024-04-26 RX ADMIN — ASPIRIN 81 MG: 81 TABLET, COATED ORAL at 05:30

## 2024-04-26 RX ADMIN — METOPROLOL TARTRATE 12.5 MG: 25 TABLET, FILM COATED ORAL at 10:01

## 2024-04-26 RX ADMIN — LORAZEPAM 0.5 MG: 0.5 TABLET ORAL at 05:30

## 2024-04-26 RX ADMIN — DULOXETINE HYDROCHLORIDE 60 MG: 30 CAPSULE, DELAYED RELEASE ORAL at 05:30

## 2024-04-26 RX ADMIN — DOCUSATE SODIUM 100 MG: 100 CAPSULE, LIQUID FILLED ORAL at 05:30

## 2024-04-26 RX ADMIN — OXYCODONE HYDROCHLORIDE 10 MG: 10 TABLET ORAL at 05:30

## 2024-04-26 ASSESSMENT — COGNITIVE AND FUNCTIONAL STATUS - GENERAL
TOILETING: A LOT
SUGGESTED CMS G CODE MODIFIER MOBILITY: CL
STANDING UP FROM CHAIR USING ARMS: A LOT
MOVING FROM LYING ON BACK TO SITTING ON SIDE OF FLAT BED: A LOT
HELP NEEDED FOR BATHING: A LOT
CLIMB 3 TO 5 STEPS WITH RAILING: TOTAL
DRESSING REGULAR UPPER BODY CLOTHING: A LOT
WALKING IN HOSPITAL ROOM: A LOT
MOBILITY SCORE: 11
MOVING TO AND FROM BED TO CHAIR: A LOT
PERSONAL GROOMING: A LITTLE
TURNING FROM BACK TO SIDE WHILE IN FLAT BAD: A LOT
SUGGESTED CMS G CODE MODIFIER DAILY ACTIVITY: CK
DRESSING REGULAR LOWER BODY CLOTHING: A LOT
DAILY ACTIVITIY SCORE: 15

## 2024-04-26 ASSESSMENT — GAIT ASSESSMENTS
DISTANCE (FEET): 75
ASSISTIVE DEVICE: HAND HELD ASSIST
DEVIATION: DECREASED BASE OF SUPPORT
DISTANCE (FEET): 12
GAIT LEVEL OF ASSIST: MODERATE ASSIST

## 2024-04-26 ASSESSMENT — PAIN DESCRIPTION - PAIN TYPE
TYPE: ACUTE PAIN;SURGICAL PAIN

## 2024-04-26 ASSESSMENT — ACTIVITIES OF DAILY LIVING (ADL): TOILETING: REQUIRES ASSIST

## 2024-04-26 NOTE — PROGRESS NOTES
Report received from PACU nurse, pt came in via Hospital Bed. Belongings placed in cabinet.   Pt alert and oriented, on 4L, NC.   Assessment done. Pain scale of 0/10.Denies any nausea and SOB.   Dressing dry and intact, ice applied.  Fall precautions in place. Call light within reach. Bed alarm and strip alarm in place.  Vs taken and monitored.   Voided freely, output of 200,L ,post operative.  Hourly rounding in progress.

## 2024-04-26 NOTE — PROGRESS NOTES
Repeat paged to MD. Daughter at Bedside, per daughter current confusion is not baseline.     MD Matute return page. Hospitalist to be contacted per MD Matute for consult     BSRN updated MD, pt cleared by OT, more calm and cooperative appears to be baseline per family. MD okay without hosp consult

## 2024-04-26 NOTE — DISCHARGE PLANNING
Received Choice Form at: 4/24/24 3774  Agency/Facility Name: [facility]  Sent Referral per Choice Form at: Scanned in to media. Waiting on orders

## 2024-04-26 NOTE — DISCHARGE PLANNING
Received Choice Form at: 1502  Agency/Facility Name: [facility]  Sent Referral per Choice Form at: 1550      Home health orders listed in EPIC as referral. Hoping it gets attached for approval.

## 2024-04-26 NOTE — CARE PLAN
The patient is Watcher - Medium risk of patient condition declining or worsening    Shift Goals  Clinical Goals: no falls, patient maintains LUE sling and dressing  Patient Goals: FINA    Progress made toward(s) clinical / shift goals:  Patient had no falls. Patient remains oriented to self only, confused, and impulsive. Patient removing L sling multiple times throughout the night.    Patient is not progressing towards the following goals: Patient AAOx1, self only. Impulsive and non-compliant with shoulder post-op precautions. Attempted to educate patient on safety and importance of compliance with shoulder repair precautions, patient exhibits no learning or understanding. Patient refusing to wear L sling.       Problem: Post-Operative Shoulder Replacement  Goal: Patient will achieve optimal shoulder replacement post-surgical outcomes  Outcome: Not Progressing  Goal: Proper fit of orthotic device will be assessed and maintained  Outcome: Not Progressing

## 2024-04-26 NOTE — DISCHARGE PLANNING
DPA printed home health orders as they didn't get attached with referral request. They were listed in a different spot in chart and had to printed for hard fax  Sent at 0516

## 2024-04-26 NOTE — DISCHARGE PLANNING
Met with pt and pt's daughter, Malinda at bedside to complete assessment.   Dtr states pt currently lives with family. She and her sister takes turns caring for pt.   Pt uses walker at baseline and daughters help with ADL's and  IADL's.   Dtr requesting to have HH set up to assist with d/c home.   Choice obtained for [facility] and faxed to [facility]  Dtr also requesting information on in home caregivers. Provided private caregiving list, [facility] and [facility] information     Care Transition Team Assessment    Information Source  Orientation Level: Oriented to person, Disoriented to place, Disoriented to time, Disoriented to situation  Information Given By: Relative    Elopement Risk  Legal Hold: No  Ambulatory or Self Mobile in Wheelchair: Yes  Disoriented: Situation-At Risk for Elopement, Time-At Risk for Elopement, Place-At Risk for Elopement  Elopement Risk: At Risk for Elopement  Wanderguard On: Yes  Personal Belongings: Hospital Clothing Only    Interdisciplinary Discharge Planning  Lives with - Patient's Self Care Capacity: Adult Children  Housing / Facility: [address] [facility]  Prior Services: Continuous (24 Hour) Care Giving Family    Discharge Preparedness  What is your plan after discharge?: Home with help  What are your discharge supports?: Child  Prior Functional Level: Needs Assist with Activities of Daily Living, Needs Assist with Medication Management    Functional Assesment  Prior Functional Level: Needs Assist with Activities of Daily Living, Needs Assist with Medication Management    Finances  Financial Barriers to Discharge: No  Prescription Coverage: Yes    Vision / Hearing Impairment  Right Eye Vision: Impaired, Wears Glasses  Left Eye Vision: Impaired, Wears Glasses    Psychological Assessment  History of Substance Abuse: None  History of Psychiatric Problems: No  Non-compliant with Treatment: No  Newly Diagnosed Illness: Yes    Discharge Risks or Barriers  Discharge risks or barriers?:  No    Anticipated Discharge Information  Discharge Disposition: Discharged to home/self care (01)

## 2024-04-26 NOTE — PROGRESS NOTES
Assumed care of pt at 1910, bedside report with MADHAVI Segura. Pt is AAOx 1 self only, pt confused, impulsive, not following commands or prompting. Pt not following shoulder precautions, attempted to educate patient on importance of safety and post-op precautions, patient shows no evidence of learning or understanding. Pt has no current complaints of pain. Sling to LUE in place and aquacell dressing to L shoulder CDI.    Pt is not using call light. Bed alarm and strip alarm present and active. Discussed plan of care for the night with patient, bed in lowest position, call light in reach, personal belongings in reach. No other complaints at this time.

## 2024-04-26 NOTE — PROGRESS NOTES
Pt discharged per MD orders.   Pt meeting discharge criteria.   Pain controlled.   IV removed.   Discharge instructions and prescriptions reviewed with pt daughter. Pt daughter verbalized understanding.

## 2024-04-26 NOTE — THERAPY
Occupational Therapy   Initial Evaluation     Patient Name: Navya Mcbride  Age:  88 y.o., Sex:  female  Medical Record #: [id number]  Today's Date: 4/26/2024     Precautions: (P) Non Weight Bearing Left Upper Extremity, Sling Left Upper Extremity, Fall Risk    Assessment  Patient is 88 y.o. female s/p L Reverse TSA; POD 1. O2 @2L NC in place; pt wears at night per dtr. Pt has 24/7 care/Spv from her 3 daughters. Initially per RN pt quite confused and agitated, refusing to wear sling to LUE or clothing. Once daughter arrives and reports pt always showers prior to showering (which dtrs assist with at home), pt calms and is less confused. Pt tolerates seated shower, FB dressing, toileting, grooming; see below for CLOF. Speaks [place]; Dtr interprets and very helpful/supportive. States that pt appears to be at her baseline with ambulation (1/2-assist from dtrs with FWW vs wc prior) and mentation.      Pt and daughter educated in detail on ADL's after Left Reverse Total Shoulder surgery.   Patient specifically educated on surgeon's post-operative precautions    Summary of education completed:  How to adjust sling/immobilizer for best fit.  To keep sling positioned so hand is level or slightly above elbow to reduce pull of gravity on arm and maintain shoulder in neutral positioning.  How to don & doff sling/immobilizer safely and appropriately for dressing and bathing.  How to dress upper body while maintaining post surgical precautions.  How to shower safely.  How to appropriately cover incision for showering if needed prior to removal of staples.    Proper positioning of arm during showering.   Encouraged use of hand held shower (using the non-operative extremity) to keep water away from the incision site.    Safe shower and toilet transfers.  Proper positioning while sleeping either in bed or recliner  Encouraged patient to support arm with pillows under forearm when sitting to reduce strain on the neck from the  weight of the arm and immobilizer.   Proper bed mobility and transfer techniques while maintaining NWB on surgical arm.  Proper positioning of arm for gentle wrist/hand ROM and passive elbow extension.    Pain management education - around the clock.  Pt and daughter verbalized and demonstrated understanding of education provided.          Plan  OT Eval only. Recommend pt see PT prior to dc home.   DC Equipment Recommendations: (P) None  Discharge Recommendations: (P) Anticipate that the patient will have no further occupational therapy needs after discharge from the hospital     Subjective  Agreeable     Objective   04/26/24 1110   Prior Living Situation   Prior Services Continuous (24 Hour) Care Giving Family   Housing / Facility 1 Story House   Steps Into Home 0   Steps In Home 0   Bathroom Set up Walk In Shower;Shower Chair   Equipment Owned Wheelchair;Front-Wheel Walker;Tub / Shower Seat;Bed Side Commode;Hand Held Shower;Oxygen   Lives with - Patient's Self Care Capacity Adult Children   Comments Pt's 3 daughters take turns in caring for her. Dtr reports they assist pt with dressing, showering, toielting and mobility. Pt uses FWW with family assist vs wc.   Prior Level of ADL Function   Self Feeding Independent   Grooming / Hygiene Independent   Bathing Requires Assist   Dressing Requires Assist   Toileting Requires Assist   Prior Level of IADL Function   Medication Management Requires Assist   Laundry Requires Assist   Kitchen Mobility Requires Assist   Finances Requires Assist   Home Management Requires Assist   Shopping Requires Assist   Prior Level Of Mobility Supervision With Device in Home   Driving / Transportation Relatives / Others Provide Transportation   Occupation (Pre-Hospital Vocational) Retired Due To Age   Leisure Interests Family   Precautions   Precautions Non Weight Bearing Left Upper Extremity;Sling Left Upper Extremity;Fall Risk   Vitals   O2 (LPM) 2   O2 Delivery Device Silicone Nasal  Cannula   Pain 0 - 10 Group   Location Shoulder   Location Orientation Left   Therapist Pain Assessment Post Activity Pain Same as Prior to Activity;Nurse Notified   Cognition    Level of Consciousness Alert   Comments pleasant and cooperative during eval. mild confusion. Dtr interprets English toSpanish; pt follows commands and seems to comprehend protocol.   Passive ROM Upper Body   Passive ROM Upper Body WDL   Active ROM Upper Body   Active ROM Upper Body  X   Dominant Hand Right   Comments LUE in sling   Strength Upper Body   Upper Body Strength  Not Tested   Upper Body Muscle Tone   Upper Body Muscle Tone  WDL   Coordination Upper Body   Coordination X   Balance Assessment   Sitting Balance (Static) Fair +   Sitting Balance (Dynamic) Fair   Standing Balance (Static) Fair -   Standing Balance (Dynamic) Poor +   Weight Shift Sitting Fair   Weight Shift Standing Fair   Bed Mobility    Supine to Sit Contact Guard Assist   ADL Assessment   Eating Supervision   Grooming Contact Guard Assist;Seated   Bathing Minimal Assist  (seated shower. Dtr assists as well.)   Upper Body Dressing Moderate Assist   Lower Body Dressing Moderate Assist   Toileting Moderate Assist   How much help from another person does the patient currently need...   Putting on and taking off regular lower body clothing? 2   Bathing (including washing, rinsing, and drying)? 2   Toileting, which includes using a toilet, bedpan, or urinal? 2   Putting on and taking off regular upper body clothing? 2   Taking care of personal grooming such as brushing teeth? 3   Eating meals? 4   6 Clicks Daily Activity Score 15   Functional Mobility   Sit to Stand Minimal Assist   Bed, Chair, Wheelchair Transfer Minimal Assist   Toilet Transfers Minimal Assist   Transfer Method Stand Step   Distance (Feet) 12   # of Times Distance was Traveled 1   Comments HHA; LUE in sling when ambulating in room.   Visual Perception   Visual Perception  Not Tested   Activity  Tolerance   Sitting in Chair >1 hr   Sitting Edge of Bed 22   Standing 7 total   Education Group   Education Provided Brace Wear and Care;Home Safety;Transfers;Activities of Daily Living;Use of Call Light;Weight Bearing Precautions   Role of Occupational Therapist Patient Response Patient;Family;Acceptance;Explanation;Verbal Demonstration   Brace Wear & Care Patient Response Patient;Family;Acceptance;Explanation;Verbal Demonstration   Home Safety Patient Response Patient;Family;Acceptance;Explanation;Verbal Demonstration   Transfers Patient Response Patient;Family;Acceptance;Explanation;Action Demonstration   ADL Patient Response Patient;Family;Acceptance;Explanation;Action Demonstration   Use of Call Light Patient Response Patient;Family;Acceptance;Explanation;* * Response * *   Weight Bearing Precautions Patient Response Patient;Family;Acceptance;Explanation;* * Response * *   Anticipated Discharge Equipment and Recommendations   DC Equipment Recommendations None   Discharge Recommendations Anticipate that the patient will have no further occupational therapy needs after discharge from the hospital   Interdisciplinary Plan of Care Collaboration   IDT Collaboration with  Nursing;Certified Nursing Assistant;Family / Caregiver   Patient Position at End of Therapy Seated;Family / Friend in Room;Phone within Reach;Tray Table within Reach;Call Light within Reach   Collaboration Comments OT Eval/Education. Pt's daughter very helpful; states she and her 2 sisters are able to continue to care for pt upon dc.

## 2024-04-26 NOTE — PROGRESS NOTES
Responded to pt bed alarm. Pt very confused. Pt removed SCDs, pulse ox, sling, and surgical dressing. Pt only oriented to self. Pt helped back into bed. Dressing replaced. Pt not following shoulder precautions. Attempted to educate pt on importance of resting shoulder. No learning evidence at this time.

## 2024-04-26 NOTE — THERAPY
Physical Therapy   Initial Evaluation     Patient Name: Navya Mcbride  Age:  88 y.o., Sex:  female  Medical Record #: [id number]  Today's Date: 4/26/2024     Precautions  Precautions: (P) Non Weight Bearing Left Upper Extremity    Assessment  Patient is 88 y.o. female with a diagnosis of L TSR reverse.Pt lives at home with daughters and needs mod A to transfer and ambulate.Pt appears to be at base line function.She has great help at home. She has no equipment needs    Plan    DC Equipment Recommendations: (P) None  Discharge Recommendations: (P) Recommend outpatient physical therapy services to address higher level deficits        Objective       04/26/24 1300   Charge Group   PT Evaluation PT Evaluation Mod   Total Time Spent   PT Evaluation Time Spent (Mins) 30   Precautions   Precautions Non Weight Bearing Left Upper Extremity   Prior Living Situation   Prior Services Continuous (24 Hour) Care Giving Family   Housing / Facility 1 Story House   Steps Into Home 0   Steps In Home 0   Equipment Owned Wheelchair;Front-Wheel Walker   Lives with - Patient's Self Care Capacity Adult Children   Prior Level of Functional Mobility   Bed Mobility Independent   Transfer Status Required Assist   Ambulation Required Assist   Ambulation Distance household   Assistive Devices Used Wheelchair   Cognition    Cognition / Consciousness WDL   Passive ROM Lower Body   Passive ROM Lower Body X   Active ROM Lower Body    Active ROM Lower Body  X   Strength Lower Body   Lower Body Strength  X   Coordination Lower Body    Coordination Lower Body  X   Balance Assessment   Sitting Balance (Static) Fair +   Sitting Balance (Dynamic) Fair   Standing Balance (Static) Poor   Standing Balance (Dynamic) Poor   Weight Shift Sitting Fair   Weight Shift Standing Fair   Gait Analysis   Gait Level Of Assist Moderate Assist   Assistive Device Hand Held Assist   Distance (Feet) 75   # of Times Distance was Traveled 1   Deviation Decreased Base Of  Support   # of Stairs Climbed 0   Weight Bearing Status NWB LUE   Functional Mobility   Sit to Stand Minimal Assist   Bed, Chair, Wheelchair Transfer Moderate Assist   6 Clicks Assessment - How much HELP from from another person do you currently need... (If the patient hasn't done an activity recently, how much help from another person do you think he/she would need if he/she tried?)   Turning from your back to your side while in a flat bed without using bedrails? 2   Moving from lying on your back to sitting on the side of a flat bed without using bedrails? 2   Moving to and from a bed to a chair (including a wheelchair)? 2   Standing up from a chair using your arms (e.g., wheelchair, or bedside chair)? 2   Walking in hospital room? 2   Climbing 3-5 steps with a railing? 1   6 clicks Mobility Score 11   Activity Tolerance   Sitting in Chair > 1hr   Standing 7   Patient / Family Goals    Patient / Family Goal #1 Home   Anticipated Discharge Equipment and Recommendations   DC Equipment Recommendations None   Discharge Recommendations Recommend outpatient physical therapy services to address higher level deficits   Interdisciplinary Plan of Care Collaboration   IDT Collaboration with  Nursing   Session Information   Date / Session Number  4/26   Priority 0

## 2024-04-26 NOTE — PROGRESS NOTES
4 Eyes Skin Assessment Completed by MADHAVI Segura and Abisai RN.    Head WDL  Ears WDL  Nose WDL  Mouth WDL  Neck WDL  Breast/Chest WDL  Shoulder Blades WDL  Spine WDL  (R) Arm/Elbow/Hand WDL  (L) Arm/Elbow/Hand WDL  Abdomen WDL  Groin WDL  Scrotum/Coccyx/Buttocks Redness and Blanching  (R) Leg WDL  (L) Leg WDL  (R) Heel/Foot/Toe Redness and Blanching  (L) Heel/Foot/Toe Redness and Blanching          Devices In Places Blood Pressure Cuff, Pulse Ox, SCD's, and Nasal Cannula      Interventions In Place NC W/Ear Foams, Pillows, and Heels Loaded W/Pillows    Possible Skin Injury No    Pictures Uploaded Into Epic N/A  Wound Consult Placed N/A  RN Wound Prevention Protocol Ordered No

## 2024-04-26 NOTE — PROGRESS NOTES
0700 Received report from Night shift nurse  0945 Performed assessment  Pt is A&Ox1, impulsive, non-compliant with instruction using the . Call family (malinda) to see if talking to malinda would help, pt pushed the phone away. I advised Malinda that it would be better if she came in to help reorient pt.    0800: paged Dr. Stanley letting him know that pt is getting agitated and getting up and out of bed. We have used the  to try to get her to take pressure off that left shoulder and she is non-compliant. Asking for PRN orders. He states that this is patients baseline when she is in the office and that she comes in with both daughters.    0830: received call from Dr. Stanley, he said D/C all narcotics and do not give any more sedation as this can be causing her to get worse with her dementia.    0945: Malinda (daughter) at bedside asking for an abdominal binder, I advised that we cannot just strap the arm down, and let her know that the sling is the best if the patient can keep it on. Family states that the confusion is not baseline.    1424: family is asking for home health to help with the new confusion. Paging Dr. Stanley to put in the order.    1511: Spoke to Yulissa advised that family is requesting a home health order, she sattes that she will text Dr. Stanley to put in the order. Case management at bedside speaking with family.

## 2024-05-07 NOTE — H&P
"Surgery Orthopedic History & Physical Note    Date  5/7/2024    Primary Care Physician  Thom Rodriguez M.D.      Pre-Op Diagnosis Codes:     * Left shoulder pain [M25.512]     * Osteoarthritis of left shoulder [M19.012]    HPI  This is a 88 y.o. female who presented with left shoulder pain.  She underwent a reverse left total shoulder arthroplasty.  She will need to be admitted overnight to the hospital for pain management and physical therapy and oxygen treatment.    Past Medical History:   Diagnosis Date    Anemia     chronic    Anesthesia     \"hard to wake up\"    Arthritis     hands, states RA and Osteo    Asthma     uses inhaler    Backpain     compressed discs    Breath shortness     oxygen 2L N/C prn    CATARACT     Clostridium difficile infection 01/01/2009    Coarse tremors     Colitis     Dental disorder     chronic gum infection, dentures upper and lower    Fibromyalgia     Fibromyalgia, primary     Gastritis     Glaucoma     Heart burn     Heart disease     Hiatus hernia syndrome     High cholesterol     History of pulmonary embolism - 2009 after knee surgery     2009 after knee arthroscopy.    Indigestion     Myocardial infarct (HCC) 05/01/2015    silent MI    Osteoporosis     Other specified disorder of intestines     colitis, bloating, constipation    Pain     L shoulder pain 8/10    Pneumonia     4/4/2011    Psychiatric problem     anxiety/depression    Pulmonary embolism (HCC)     left lung    Sleep apnea     no cpap uses 3L nc only at night    Stroke (HCC) 01/01/2009    right weakness    Supplemental oxygen dependent     Urinary bladder disorder     hematuria chronic       Past Surgical History:   Procedure Laterality Date    PB RECONSTR TOTAL SHOULDER IMPLANT Left 4/25/2024    Procedure: ARTHROPLASTY, SHOULDER, TOTAL, REVERSE, LEFT;  Surgeon: José Luis Matute M.D.;  Location: SURGERY [facility];  Service: Orthopedics    RECOVERY  06/11/2015    Procedure:  CATH LAB  C WITH KATHERINE DUBOIS" ICD; 794.30;  Surgeon: Recoveryonly Surgery;  Location: SURGERY PRE-POST PROC UNIT [facility];  Service:     KNEE ARTHROSCOPY  10/08/2009    Performed by JAYSHREE VIVEROS at SURGERY [facility]    MENISCECTOMY, KNEE, MEDIAL  10/08/2009    Performed by JAYSHREE VIVEROS at SURGERY [facility]    MENISCECTOMY  10/08/2009    Performed by JAYSHREE VIVEROS at SURGERY [facility]    ALHAJI BY LAPAROSCOPY  04/19/2009    Performed by GANSER, JOHN H at SURGERY [facility] ORS    ROTATOR CUFF REPAIR  01/01/2000    left    HYSTERECTOMY, TOTAL ABDOMINAL  01/01/1973    CATARACT EXTRACTION WITH IOL Bilateral     ORIF, HIP  12/25/22       No current facility-administered medications for this encounter.     Current Outpatient Medications   Medication Sig Dispense Refill    carbamazepine (CARBATROL) 200 MG CR capsule Take 200 mg by mouth every evening.      QUEtiapine (SEROQUEL) 50 MG tablet Take 50 mg by mouth at bedtime.      LORazepam (ATIVAN) 0.5 MG Tab TAKE 1 TABLET ORALLY TWICE A DAY AS NEEDED FOR 30 DAYS F41.9      ALPRAZolam (XANAX) 0.5 MG Tab Take 0.5 mg by mouth at bedtime as needed for Sleep.      rosuvastatin (CRESTOR) 20 MG Tab Take 1 Tablet by mouth every evening. 90 Tablet 3    gabapentin (NEURONTIN) 300 MG Cap Take 300 mg by mouth at bedtime.      DULoxetine (CYMBALTA) 60 MG Cap DR Particles delayed-release capsule Take 60 mg by mouth every day. AM      latanoprost (XALATAN) 0.005 % Solution INSTILL 1 DROP INTO AFFECTED EYE(S) BY OPHTHALMIC ROUTE ONCE DAILY IN THE EVENING      pantoprazole (PROTONIX) 40 MG Tablet Delayed Response 1 TABLET ORALLY ONCE A DAY 30 MINUTES AFTER A MEAL 90 DAYS      budesonide-formoterol (SYMBICORT) 80-4.5 MCG/ACT Aerosol Inhale 2 Puffs by mouth 2 Times a Day.      metoprolol (LOPRESSOR) 25 MG Tab Take 0.5 Tabs by mouth 2 times a day. 30 Tab 0    albuterol 108 (90 Base) MCG/ACT Aero Soln inhalation aerosol Inhale 2 Puffs by mouth every 6 hours as needed for Shortness of Breath.       cyclosporin (RESTASIS) 0.05 % ophthalmic emulsion Place 1 Drop in both eyes 2 times a day.      multivitamin Tab Take 1 Tablet by mouth every day.      HYDROcodone-acetaminophen (NORCO) 7.5-325 MG tab Take 1 Tablet by mouth every 6 hours as needed for Moderate Pain.      oxyCODONE immediate-release (ROXICODONE) 5 MG Tab Take 7.5 mg by mouth every four hours as needed for Severe Pain.      Coenzyme Q10 300 MG Cap Take 1 Capsule by mouth every day. 30 Capsule     nitroglycerin (NITROSTAT) 0.4 MG SL Tab Place 1 Tablet under the tongue as needed for Chest Pain. 25 Tablet 11    clopidogrel (PLAVIX) 75 MG Tab Take 75 mg by mouth every day.      COMBIVENT RESPIMAT  MCG/ACT Aero Soln Inhale 1 Puff as needed (takes as needed SOB).      celecoxib (CELEBREX) 200 MG Cap Take 200 mg by mouth 2 times a day.         Social History     Socioeconomic History    Marital status:      Spouse name: Not on file    Number of children: Not on file    Years of education: Not on file    Highest education level: Not on file   Occupational History    Not on file   Tobacco Use    Smoking status: Never    Smokeless tobacco: Never    Tobacco comments:     As a teenager   Vaping Use    Vaping Use: Never used   Substance and Sexual Activity    Alcohol use: No    Drug use: No    Sexual activity: Not on file   Other Topics Concern    Not on file   Social History Narrative    Not on file     Social Determinants of Health     Financial Resource Strain: Not on file   Food Insecurity: Not on file   Transportation Needs: Not on file   Physical Activity: Not on file   Stress: Not on file   Social Connections: Not on file   Intimate Partner Violence: Not on file   Housing Stability: Not on file       Family History   Problem Relation Age of Onset    Anesthesia Father         difficulty waking post anesthesia    Diabetes Other     Heart Disease Other     Hypertension Other     Stroke Other     Cancer Other        Allergies  Contrast media with  iodine [iodine] and Tequin [gatifloxacin sesquihydrate]    Review of Systems  Negative    Physical Exam  Lungs:  CTA bilat  CV:  RRR  Left shoulder:  n/v intact  Vital Signs  Blood Pressure : (!) 152/60   Temperature: 36.4 °C (97.5 °F)   Pulse: (!) 105   Respiration: 18   Pulse Oximetry: 95 %       Labs:                    Radiology:  DX-SHOULDER 1 VIEW LEFT   Final Result      Post left shoulder reverse arthroplasty.            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

## 2024-05-07 NOTE — PROGRESS NOTES
Date of Admission: 4/25/24  Date of Discharge: 4/26/24    Preoperative diagnosis: left shoulder DJD  Postoperative diagnosis: left shoulder DJD    Procedure performed: Left Reverse Total shoulder Arthoplasty    Surgeon and Attending Physician: Dr. José Luis Matute    Complications: None    Discharge Condition: The patient will be discharged home in stable condition and good pain relief.     Clinical History: Navya is a 88 year old female who presented with a chief complaint of left shoulder pain. The patient presented with advanced shoulder DJD. The patient tried and failed extensive conservative treatments and elected to undergo a Total knee arthroplasty.     Hospital Course: The patient was taken to the orthopedic unit following the procedure. The patient was in stable condition throughout the entire hospital stay. On Post op Day number 1 the patient was ambulatory safely in the simpson. The patient was cleared by physical therapy and occupational therapy prior to discharge. The patient was voiding, tolerating a regular diet, and had no complaints of nausea, vomiting, shortness of breath or chest pain. The patient was distally neurovascularly intact in bilateral upper extremities. Radial, ulnar, medial nerves were intact, good  strength. The incision was clean, dry and approximated, without signs of infection. Post op labs and vital signs remained stable. There were no complaints of calf pain to palpation, redness, heat or edema.     Discharge instructions:  1. To follow up with Dr. Matute in 7-10 days  2. To monitor the incision closely, and call for symptoms of infection. Keep the incision clean, dry and covered. Ok to shower, but cover with saran wrap and tape. Change the dressing as needed.   3. Monitor for signs of DVT and call if present.   4. Use over-the-counter stool softeners or laxatives as needed for constipation.  5. Wean off narcotics as tolerated.   6. Non-weight bearing x 6 weeks. Codman's exercises  ok. Passive range of motion only x 6 weeks. No active range of motion for 6 weeks. Ok to perform active range of motion at the elbow and wrist.  7. To start outpatient as scheduled, or within 7 days.   8. You must wear the brace at all times, except for showering and physical therapy. You must sleep in the brace.

## 2024-05-20 ENCOUNTER — APPOINTMENT (OUTPATIENT)
Dept: RADIOLOGY | Facility: MEDICAL CENTER | Age: 89
DRG: 206 | End: 2024-05-20
Attending: EMERGENCY MEDICINE
Payer: MEDICARE

## 2024-05-20 ENCOUNTER — APPOINTMENT (OUTPATIENT)
Dept: RADIOLOGY | Facility: MEDICAL CENTER | Age: 89
DRG: 206 | End: 2024-05-20
Attending: HOSPITALIST
Payer: MEDICARE

## 2024-05-20 ENCOUNTER — HOSPITAL ENCOUNTER (INPATIENT)
Facility: MEDICAL CENTER | Age: 89
LOS: 3 days | DRG: 206 | End: 2024-05-23
Attending: EMERGENCY MEDICINE | Admitting: HOSPITALIST
Payer: MEDICARE

## 2024-05-20 DIAGNOSIS — R53.1 GENERALIZED WEAKNESS: ICD-10-CM

## 2024-05-20 DIAGNOSIS — R91.1 PULMONARY NODULE: ICD-10-CM

## 2024-05-20 DIAGNOSIS — I10 HYPERTENSION, UNSPECIFIED TYPE: ICD-10-CM

## 2024-05-20 DIAGNOSIS — F03.90 DEMENTIA, UNSPECIFIED DEMENTIA SEVERITY, UNSPECIFIED DEMENTIA TYPE, UNSPECIFIED WHETHER BEHAVIORAL, PSYCHOTIC, OR MOOD DISTURBANCE OR ANXIETY (HCC): ICD-10-CM

## 2024-05-20 DIAGNOSIS — J22 ACUTE LOWER RESPIRATORY INFECTION: ICD-10-CM

## 2024-05-20 DIAGNOSIS — S40.021A CONTUSION OF MULTIPLE SITES OF RIGHT UPPER ARM, INITIAL ENCOUNTER: ICD-10-CM

## 2024-05-20 DIAGNOSIS — M25.511 RIGHT SHOULDER PAIN, UNSPECIFIED CHRONICITY: ICD-10-CM

## 2024-05-20 DIAGNOSIS — R09.02 HYPOXIA: ICD-10-CM

## 2024-05-20 DIAGNOSIS — J44.9 CHRONIC OBSTRUCTIVE PULMONARY DISEASE, UNSPECIFIED COPD TYPE (HCC): ICD-10-CM

## 2024-05-20 LAB
ALBUMIN SERPL BCP-MCNC: 3.7 G/DL (ref 3.2–4.9)
ALBUMIN/GLOB SERPL: 1.2 G/DL
ALP SERPL-CCNC: 131 U/L (ref 30–99)
ALT SERPL-CCNC: 17 U/L (ref 2–50)
ANION GAP SERPL CALC-SCNC: 11 MMOL/L (ref 7–16)
AST SERPL-CCNC: 21 U/L (ref 12–45)
BASOPHILS # BLD AUTO: 0.5 % (ref 0–1.8)
BASOPHILS # BLD: 0.02 K/UL (ref 0–0.12)
BILIRUB SERPL-MCNC: <0.2 MG/DL (ref 0.1–1.5)
BUN SERPL-MCNC: 20 MG/DL (ref 8–22)
CALCIUM ALBUM COR SERPL-MCNC: 9.6 MG/DL (ref 8.5–10.5)
CALCIUM SERPL-MCNC: 9.4 MG/DL (ref 8.4–10.2)
CHLORIDE SERPL-SCNC: 104 MMOL/L (ref 96–112)
CO2 SERPL-SCNC: 23 MMOL/L (ref 20–33)
CREAT SERPL-MCNC: 0.66 MG/DL (ref 0.5–1.4)
D DIMER PPP IA.FEU-MCNC: 2.63 UG/ML (FEU) (ref 0–0.5)
EKG IMPRESSION: NORMAL
EOSINOPHIL # BLD AUTO: 0.15 K/UL (ref 0–0.51)
EOSINOPHIL NFR BLD: 3.5 % (ref 0–6.9)
ERYTHROCYTE [DISTWIDTH] IN BLOOD BY AUTOMATED COUNT: 47.8 FL (ref 35.9–50)
FLUAV RNA SPEC QL NAA+PROBE: NEGATIVE
FLUBV RNA SPEC QL NAA+PROBE: NEGATIVE
GFR SERPLBLD CREATININE-BSD FMLA CKD-EPI: 84 ML/MIN/1.73 M 2
GLOBULIN SER CALC-MCNC: 3.2 G/DL (ref 1.9–3.5)
GLUCOSE SERPL-MCNC: 118 MG/DL (ref 65–99)
HCT VFR BLD AUTO: 39 % (ref 37–47)
HGB BLD-MCNC: 12.7 G/DL (ref 12–16)
IMM GRANULOCYTES # BLD AUTO: 0.01 K/UL (ref 0–0.11)
IMM GRANULOCYTES NFR BLD AUTO: 0.2 % (ref 0–0.9)
LACTATE SERPL-SCNC: 1.3 MMOL/L (ref 0.5–2)
LYMPHOCYTES # BLD AUTO: 1.12 K/UL (ref 1–4.8)
LYMPHOCYTES NFR BLD: 26.2 % (ref 22–41)
MCH RBC QN AUTO: 29.7 PG (ref 27–33)
MCHC RBC AUTO-ENTMCNC: 32.6 G/DL (ref 32.2–35.5)
MCV RBC AUTO: 91.3 FL (ref 81.4–97.8)
MONOCYTES # BLD AUTO: 0.32 K/UL (ref 0–0.85)
MONOCYTES NFR BLD AUTO: 7.5 % (ref 0–13.4)
NEUTROPHILS # BLD AUTO: 2.66 K/UL (ref 1.82–7.42)
NEUTROPHILS NFR BLD: 62.1 % (ref 44–72)
NRBC # BLD AUTO: 0 K/UL
NRBC BLD-RTO: 0 /100 WBC (ref 0–0.2)
NT-PROBNP SERPL IA-MCNC: 57 PG/ML (ref 0–125)
PLATELET # BLD AUTO: 241 K/UL (ref 164–446)
PMV BLD AUTO: 10.4 FL (ref 9–12.9)
POTASSIUM SERPL-SCNC: 3.9 MMOL/L (ref 3.6–5.5)
PROCALCITONIN SERPL-MCNC: 0.06 NG/ML
PROT SERPL-MCNC: 6.9 G/DL (ref 6–8.2)
RBC # BLD AUTO: 4.27 M/UL (ref 4.2–5.4)
RSV RNA SPEC QL NAA+PROBE: NEGATIVE
SARS-COV-2 RNA RESP QL NAA+PROBE: NOTDETECTED
SODIUM SERPL-SCNC: 138 MMOL/L (ref 135–145)
SPECIMEN SOURCE: NORMAL
TROPONIN T SERPL-MCNC: 15 NG/L (ref 6–19)
TROPONIN T SERPL-MCNC: 16 NG/L (ref 6–19)
WBC # BLD AUTO: 4.3 K/UL (ref 4.8–10.8)

## 2024-05-20 PROCEDURE — 99223 1ST HOSP IP/OBS HIGH 75: CPT | Mod: AI | Performed by: HOSPITALIST

## 2024-05-20 RX ORDER — CARBAMAZEPINE 200 MG/1
200 TABLET, EXTENDED RELEASE ORAL EVERY EVENING
Status: DISCONTINUED | OUTPATIENT
Start: 2024-05-21 | End: 2024-05-21

## 2024-05-20 RX ORDER — ONDANSETRON 4 MG/1
4 TABLET, ORALLY DISINTEGRATING ORAL EVERY 4 HOURS PRN
Status: DISCONTINUED | OUTPATIENT
Start: 2024-05-20 | End: 2024-05-23 | Stop reason: HOSPADM

## 2024-05-20 RX ORDER — QUETIAPINE FUMARATE 25 MG/1
50 TABLET, FILM COATED ORAL
Status: DISCONTINUED | OUTPATIENT
Start: 2024-05-20 | End: 2024-05-23 | Stop reason: HOSPADM

## 2024-05-20 RX ORDER — ONDANSETRON 2 MG/ML
4 INJECTION INTRAMUSCULAR; INTRAVENOUS EVERY 4 HOURS PRN
Status: DISCONTINUED | OUTPATIENT
Start: 2024-05-20 | End: 2024-05-23 | Stop reason: HOSPADM

## 2024-05-20 RX ORDER — ALPRAZOLAM 0.5 MG/1
0.5 TABLET ORAL NIGHTLY PRN
Status: DISCONTINUED | OUTPATIENT
Start: 2024-05-20 | End: 2024-05-23 | Stop reason: HOSPADM

## 2024-05-20 RX ORDER — ACETAMINOPHEN 325 MG/1
650 TABLET ORAL EVERY 6 HOURS PRN
Status: DISCONTINUED | OUTPATIENT
Start: 2024-05-20 | End: 2024-05-23 | Stop reason: HOSPADM

## 2024-05-20 RX ORDER — LATANOPROST 50 UG/ML
2 SOLUTION/ DROPS OPHTHALMIC EVERY EVENING
Status: DISCONTINUED | OUTPATIENT
Start: 2024-05-21 | End: 2024-05-21

## 2024-05-20 RX ORDER — CLOPIDOGREL BISULFATE 75 MG/1
75 TABLET ORAL DAILY
Status: DISCONTINUED | OUTPATIENT
Start: 2024-05-21 | End: 2024-05-23 | Stop reason: HOSPADM

## 2024-05-20 RX ORDER — ONDANSETRON 2 MG/ML
4 INJECTION INTRAMUSCULAR; INTRAVENOUS ONCE
Status: COMPLETED | OUTPATIENT
Start: 2024-05-20 | End: 2024-05-20

## 2024-05-20 RX ORDER — DULOXETIN HYDROCHLORIDE 30 MG/1
60 CAPSULE, DELAYED RELEASE ORAL DAILY
Status: DISCONTINUED | OUTPATIENT
Start: 2024-05-21 | End: 2024-05-21

## 2024-05-20 RX ORDER — HYDROCODONE BITARTRATE AND ACETAMINOPHEN 10; 325 MG/1; MG/1
1 TABLET ORAL EVERY 6 HOURS PRN
Status: DISCONTINUED | OUTPATIENT
Start: 2024-05-20 | End: 2024-05-21

## 2024-05-20 RX ORDER — DIPHENHYDRAMINE HYDROCHLORIDE 50 MG/ML
50 INJECTION INTRAMUSCULAR; INTRAVENOUS ONCE
Status: COMPLETED | OUTPATIENT
Start: 2024-05-20 | End: 2024-05-20

## 2024-05-20 RX ORDER — AMOXICILLIN 250 MG
2 CAPSULE ORAL 2 TIMES DAILY
Status: DISCONTINUED | OUTPATIENT
Start: 2024-05-20 | End: 2024-05-23 | Stop reason: HOSPADM

## 2024-05-20 RX ORDER — POLYETHYLENE GLYCOL 3350 17 G/17G
1 POWDER, FOR SOLUTION ORAL
Status: DISCONTINUED | OUTPATIENT
Start: 2024-05-20 | End: 2024-05-23 | Stop reason: HOSPADM

## 2024-05-20 RX ORDER — OMEPRAZOLE 20 MG/1
20 CAPSULE, DELAYED RELEASE ORAL DAILY
Status: DISCONTINUED | OUTPATIENT
Start: 2024-05-21 | End: 2024-05-23 | Stop reason: HOSPADM

## 2024-05-20 RX ORDER — LORAZEPAM 1 MG/1
1 TABLET ORAL ONCE
Status: COMPLETED | OUTPATIENT
Start: 2024-05-20 | End: 2024-05-20

## 2024-05-20 RX ORDER — CARBOXYMETHYLCELLULOSE SODIUM 5 MG/ML
1 SOLUTION/ DROPS OPHTHALMIC 2 TIMES DAILY
Status: DISCONTINUED | OUTPATIENT
Start: 2024-05-21 | End: 2024-05-23 | Stop reason: HOSPADM

## 2024-05-20 RX ADMIN — DIPHENHYDRAMINE HYDROCHLORIDE 50 MG: 50 INJECTION, SOLUTION INTRAMUSCULAR; INTRAVENOUS at 23:30

## 2024-05-20 RX ADMIN — ONDANSETRON 4 MG: 2 INJECTION INTRAMUSCULAR; INTRAVENOUS at 20:01

## 2024-05-20 RX ADMIN — FENTANYL CITRATE 25 MCG: 50 INJECTION, SOLUTION INTRAMUSCULAR; INTRAVENOUS at 20:01

## 2024-05-20 RX ADMIN — LORAZEPAM 1 MG: 1 TABLET ORAL at 22:22

## 2024-05-20 ASSESSMENT — LIFESTYLE VARIABLES
TOTAL SCORE: 0
HAVE YOU EVER FELT YOU SHOULD CUT DOWN ON YOUR DRINKING: NO
EVER HAD A DRINK FIRST THING IN THE MORNING TO STEADY YOUR NERVES TO GET RID OF A HANGOVER: NO
TOTAL SCORE: 0
AVERAGE NUMBER OF DAYS PER WEEK YOU HAVE A DRINK CONTAINING ALCOHOL: 0
EVER FELT BAD OR GUILTY ABOUT YOUR DRINKING: NO
CONSUMPTION TOTAL: NEGATIVE
DO YOU DRINK ALCOHOL: NO
ON A TYPICAL DAY WHEN YOU DRINK ALCOHOL HOW MANY DRINKS DO YOU HAVE: 0
DOES PATIENT WANT TO STOP DRINKING: NO
HAVE PEOPLE ANNOYED YOU BY CRITICIZING YOUR DRINKING: NO
TOTAL SCORE: 0
HOW MANY TIMES IN THE PAST YEAR HAVE YOU HAD 5 OR MORE DRINKS IN A DAY: 0

## 2024-05-20 ASSESSMENT — FIBROSIS 4 INDEX: FIB4 SCORE: 1.88

## 2024-05-21 PROBLEM — D64.9 ANEMIA: Status: ACTIVE | Noted: 2024-05-21

## 2024-05-21 PROBLEM — M25.511 RIGHT SHOULDER PAIN: Status: ACTIVE | Noted: 2024-05-21

## 2024-05-21 PROBLEM — F03.90 DEMENTIA (HCC): Status: ACTIVE | Noted: 2024-05-21

## 2024-05-21 LAB
ANION GAP SERPL CALC-SCNC: 8 MMOL/L (ref 7–16)
BUN SERPL-MCNC: 19 MG/DL (ref 8–22)
CALCIUM SERPL-MCNC: 7.8 MG/DL (ref 8.4–10.2)
CHLORIDE SERPL-SCNC: 108 MMOL/L (ref 96–112)
CO2 SERPL-SCNC: 22 MMOL/L (ref 20–33)
CREAT SERPL-MCNC: 0.56 MG/DL (ref 0.5–1.4)
ERYTHROCYTE [DISTWIDTH] IN BLOOD BY AUTOMATED COUNT: 48 FL (ref 35.9–50)
GFR SERPLBLD CREATININE-BSD FMLA CKD-EPI: 88 ML/MIN/1.73 M 2
GLUCOSE SERPL-MCNC: 99 MG/DL (ref 65–99)
HCT VFR BLD AUTO: 33.7 % (ref 37–47)
HGB BLD-MCNC: 10.9 G/DL (ref 12–16)
LACTATE SERPL-SCNC: 0.8 MMOL/L (ref 0.5–2)
LACTATE SERPL-SCNC: 0.8 MMOL/L (ref 0.5–2)
MCH RBC QN AUTO: 29.9 PG (ref 27–33)
MCHC RBC AUTO-ENTMCNC: 32.3 G/DL (ref 32.2–35.5)
MCV RBC AUTO: 92.3 FL (ref 81.4–97.8)
PLATELET # BLD AUTO: 198 K/UL (ref 164–446)
PMV BLD AUTO: 10.5 FL (ref 9–12.9)
POTASSIUM SERPL-SCNC: 3.6 MMOL/L (ref 3.6–5.5)
RBC # BLD AUTO: 3.65 M/UL (ref 4.2–5.4)
SODIUM SERPL-SCNC: 138 MMOL/L (ref 135–145)
WBC # BLD AUTO: 4 K/UL (ref 4.8–10.8)

## 2024-05-21 PROCEDURE — 99233 SBSQ HOSP IP/OBS HIGH 50: CPT | Performed by: INTERNAL MEDICINE

## 2024-05-21 RX ORDER — METOPROLOL SUCCINATE 25 MG/1
25 TABLET, EXTENDED RELEASE ORAL 2 TIMES DAILY
COMMUNITY
Start: 2024-04-01

## 2024-05-21 RX ORDER — HYDROCODONE BITARTRATE AND ACETAMINOPHEN 10; 325 MG/1; MG/1
1 TABLET ORAL EVERY 6 HOURS PRN
Status: DISCONTINUED | OUTPATIENT
Start: 2024-05-21 | End: 2024-05-23 | Stop reason: HOSPADM

## 2024-05-21 RX ORDER — LORAZEPAM 0.5 MG/1
0.5 TABLET ORAL EVERY 8 HOURS PRN
Status: DISCONTINUED | OUTPATIENT
Start: 2024-05-21 | End: 2024-05-23 | Stop reason: HOSPADM

## 2024-05-21 RX ORDER — CARBAMAZEPINE 100 MG/1
200 TABLET, EXTENDED RELEASE ORAL EVERY EVENING
Status: DISCONTINUED | OUTPATIENT
Start: 2024-05-21 | End: 2024-05-23 | Stop reason: HOSPADM

## 2024-05-21 RX ORDER — LINEZOLID 2 MG/ML
600 INJECTION, SOLUTION INTRAVENOUS EVERY 12 HOURS
Status: DISCONTINUED | OUTPATIENT
Start: 2024-05-21 | End: 2024-05-22

## 2024-05-21 RX ORDER — LATANOPROST 50 UG/ML
1 SOLUTION/ DROPS OPHTHALMIC EVERY EVENING
Status: DISCONTINUED | OUTPATIENT
Start: 2024-05-21 | End: 2024-05-23 | Stop reason: HOSPADM

## 2024-05-21 RX ADMIN — SENNOSIDES AND DOCUSATE SODIUM 2 TABLET: 50; 8.6 TABLET ORAL at 05:52

## 2024-05-21 RX ADMIN — MOMETASONE FUROATE AND FORMOTEROL FUMARATE DIHYDRATE 2 PUFF: 200; 5 AEROSOL RESPIRATORY (INHALATION) at 10:17

## 2024-05-21 RX ADMIN — METOPROLOL TARTRATE 12.5 MG: 25 TABLET, FILM COATED ORAL at 17:39

## 2024-05-21 RX ADMIN — DULOXETINE HYDROCHLORIDE 60 MG: 30 CAPSULE, DELAYED RELEASE ORAL at 05:51

## 2024-05-21 RX ADMIN — IOHEXOL 72 ML: 350 INJECTION, SOLUTION INTRAVENOUS at 00:21

## 2024-05-21 RX ADMIN — LORAZEPAM 0.5 MG: 0.5 TABLET ORAL at 19:41

## 2024-05-21 RX ADMIN — CEFAZOLIN 2 G: 2 INJECTION, POWDER, FOR SOLUTION INTRAMUSCULAR; INTRAVENOUS at 22:43

## 2024-05-21 RX ADMIN — CARBOXYMETHYLCELLULOSE SODIUM 1 DROP: 5 SOLUTION/ DROPS OPHTHALMIC at 05:51

## 2024-05-21 RX ADMIN — OMEPRAZOLE 20 MG: 20 CAPSULE, DELAYED RELEASE ORAL at 05:52

## 2024-05-21 RX ADMIN — MOMETASONE FUROATE AND FORMOTEROL FUMARATE DIHYDRATE 2 PUFF: 200; 5 AEROSOL RESPIRATORY (INHALATION) at 17:56

## 2024-05-21 RX ADMIN — HYDROCODONE BITARTRATE AND ACETAMINOPHEN 1 TABLET: 10; 325 TABLET ORAL at 07:02

## 2024-05-21 RX ADMIN — CARBOXYMETHYLCELLULOSE SODIUM 1 DROP: 5 SOLUTION/ DROPS OPHTHALMIC at 17:55

## 2024-05-21 RX ADMIN — CARBAMAZEPINE 200 MG: 100 TABLET, EXTENDED RELEASE ORAL at 17:38

## 2024-05-21 RX ADMIN — ACETAMINOPHEN 650 MG: 325 TABLET ORAL at 12:10

## 2024-05-21 RX ADMIN — QUETIAPINE FUMARATE 50 MG: 25 TABLET ORAL at 19:33

## 2024-05-21 RX ADMIN — HYDROCODONE BITARTRATE AND ACETAMINOPHEN 1 TABLET: 10; 325 TABLET ORAL at 16:57

## 2024-05-21 RX ADMIN — CLOPIDOGREL BISULFATE 75 MG: 75 TABLET ORAL at 05:51

## 2024-05-21 RX ADMIN — METOPROLOL TARTRATE 12.5 MG: 25 TABLET, FILM COATED ORAL at 05:52

## 2024-05-21 RX ADMIN — LATANOPROST 1 DROP: 50 SOLUTION OPHTHALMIC at 17:39

## 2024-05-21 RX ADMIN — LINEZOLID 600 MG: 600 INJECTION INTRAVENOUS at 21:05

## 2024-05-21 RX ADMIN — ALBUTEROL SULFATE 2.5 MG: 2.5 SOLUTION RESPIRATORY (INHALATION) at 10:17

## 2024-05-21 RX ADMIN — SENNOSIDES AND DOCUSATE SODIUM 2 TABLET: 50; 8.6 TABLET ORAL at 17:39

## 2024-05-21 RX ADMIN — QUETIAPINE FUMARATE 50 MG: 25 TABLET ORAL at 00:33

## 2024-05-21 RX ADMIN — ALBUTEROL SULFATE 2.5 MG: 2.5 SOLUTION RESPIRATORY (INHALATION) at 03:44

## 2024-05-21 SDOH — ECONOMIC STABILITY: TRANSPORTATION INSECURITY
IN THE PAST 12 MONTHS, HAS LACK OF RELIABLE TRANSPORTATION KEPT YOU FROM MEDICAL APPOINTMENTS, MEETINGS, WORK OR FROM GETTING THINGS NEEDED FOR DAILY LIVING?: NO

## 2024-05-21 SDOH — ECONOMIC STABILITY: TRANSPORTATION INSECURITY
IN THE PAST 12 MONTHS, HAS THE LACK OF TRANSPORTATION KEPT YOU FROM MEDICAL APPOINTMENTS OR FROM GETTING MEDICATIONS?: NO

## 2024-05-21 ASSESSMENT — COGNITIVE AND FUNCTIONAL STATUS - GENERAL
CLIMB 3 TO 5 STEPS WITH RAILING: A LITTLE
MOVING FROM LYING ON BACK TO SITTING ON SIDE OF FLAT BED: A LITTLE
WALKING IN HOSPITAL ROOM: A LITTLE
DRESSING REGULAR LOWER BODY CLOTHING: A LITTLE
MOVING TO AND FROM BED TO CHAIR: A LITTLE
DAILY ACTIVITIY SCORE: 20
HELP NEEDED FOR BATHING: A LITTLE
DRESSING REGULAR UPPER BODY CLOTHING: A LITTLE
MOBILITY SCORE: 19
SUGGESTED CMS G CODE MODIFIER MOBILITY: CK
SUGGESTED CMS G CODE MODIFIER DAILY ACTIVITY: CJ
TOILETING: A LITTLE
STANDING UP FROM CHAIR USING ARMS: A LITTLE

## 2024-05-21 ASSESSMENT — LIFESTYLE VARIABLES
SUBSTANCE_ABUSE: 0
TOTAL SCORE: 0
ON A TYPICAL DAY WHEN YOU DRINK ALCOHOL HOW MANY DRINKS DO YOU HAVE: 0
AVERAGE NUMBER OF DAYS PER WEEK YOU HAVE A DRINK CONTAINING ALCOHOL: 0
CONSUMPTION TOTAL: NEGATIVE
EVER HAD A DRINK FIRST THING IN THE MORNING TO STEADY YOUR NERVES TO GET RID OF A HANGOVER: NO
HAVE PEOPLE ANNOYED YOU BY CRITICIZING YOUR DRINKING: NO
ALCOHOL_USE: NO
HAVE YOU EVER FELT YOU SHOULD CUT DOWN ON YOUR DRINKING: NO
HOW MANY TIMES IN THE PAST YEAR HAVE YOU HAD 5 OR MORE DRINKS IN A DAY: 0
TOTAL SCORE: 0
EVER FELT BAD OR GUILTY ABOUT YOUR DRINKING: NO
TOTAL SCORE: 0

## 2024-05-21 ASSESSMENT — COPD QUESTIONNAIRES
DO YOU EVER COUGH UP ANY MUCUS OR PHLEGM?: YES, A FEW DAYS A WEEK OR MONTH
COPD SCREENING SCORE: 6
HAVE YOU SMOKED AT LEAST 100 CIGARETTES IN YOUR ENTIRE LIFE: YES
DO YOU EVER COUGH UP ANY MUCUS OR PHLEGM?: NO/ONLY WITH OCCASIONAL COLDS OR INFECTIONS
DURING THE PAST 4 WEEKS HOW MUCH DID YOU FEEL SHORT OF BREATH: SOME OF THE TIME
DURING THE PAST 4 WEEKS HOW MUCH DID YOU FEEL SHORT OF BREATH: SOME OF THE TIME
HAVE YOU SMOKED AT LEAST 100 CIGARETTES IN YOUR ENTIRE LIFE: NO/DON'T KNOW
COPD SCREENING SCORE: 3

## 2024-05-21 ASSESSMENT — ENCOUNTER SYMPTOMS
BACK PAIN: 0
MYALGIAS: 0
HEARTBURN: 0
DEPRESSION: 0
CHILLS: 0
BRUISES/BLEEDS EASILY: 0
INSOMNIA: 0
NERVOUS/ANXIOUS: 0
SORE THROAT: 0
BLURRED VISION: 0
DOUBLE VISION: 0
SHORTNESS OF BREATH: 1
DIZZINESS: 0
ABDOMINAL PAIN: 0
NECK PAIN: 0
SHORTNESS OF BREATH: 0
HEADACHES: 0
VOMITING: 0
WEAKNESS: 1
COUGH: 0
NAUSEA: 0
FEVER: 0
WEAKNESS: 0
PALPITATIONS: 0

## 2024-05-21 ASSESSMENT — SOCIAL DETERMINANTS OF HEALTH (SDOH)
WITHIN THE LAST YEAR, HAVE YOU BEEN KICKED, HIT, SLAPPED, OR OTHERWISE PHYSICALLY HURT BY YOUR PARTNER OR EX-PARTNER?: PATIENT UNABLE TO ANSWER
IN THE PAST 12 MONTHS, HAS THE ELECTRIC, GAS, OIL, OR WATER COMPANY THREATENED TO SHUT OFF SERVICE IN YOUR HOME?: PATIENT UNABLE TO ANSWER
WITHIN THE LAST YEAR, HAVE YOU BEEN HUMILIATED OR EMOTIONALLY ABUSED IN OTHER WAYS BY YOUR PARTNER OR EX-PARTNER?: PATIENT UNABLE TO ANSWER
WITHIN THE PAST 12 MONTHS, YOU WORRIED THAT YOUR FOOD WOULD RUN OUT BEFORE YOU GOT THE MONEY TO BUY MORE: NEVER TRUE
WITHIN THE PAST 12 MONTHS, THE FOOD YOU BOUGHT JUST DIDN'T LAST AND YOU DIDN'T HAVE MONEY TO GET MORE: NEVER TRUE
WITHIN THE LAST YEAR, HAVE YOU BEEN AFRAID OF YOUR PARTNER OR EX-PARTNER?: PATIENT UNABLE TO ANSWER
WITHIN THE LAST YEAR, HAVE TO BEEN RAPED OR FORCED TO HAVE ANY KIND OF SEXUAL ACTIVITY BY YOUR PARTNER OR EX-PARTNER?: PATIENT UNABLE TO ANSWER

## 2024-05-21 ASSESSMENT — PAIN DESCRIPTION - PAIN TYPE
TYPE: ACUTE PAIN
TYPE: ACUTE PAIN

## 2024-05-21 NOTE — ASSESSMENT & PLAN NOTE
As per the daughter patient with a history of dementia.  The patient is pleasantly confused, however at baseline the patient is also pleasantly confused.  Continue to monitor closely.

## 2024-05-21 NOTE — ED NOTES
Received report from MADHAVI Jordan.  Pt resting quietly at this time w/ family at bedside.  Pending available bed for admission.

## 2024-05-21 NOTE — ASSESSMENT & PLAN NOTE
-Patient takes Xanax, Seroquel, duloxetine and carbamazepine for behavior control.  She is pleasant and talkative in the emergency department.  All her medications were ordered.

## 2024-05-21 NOTE — ASSESSMENT & PLAN NOTE
-Inpatient status to telemetry.  -Patient has URI symptoms and currently requiring 3 L of oxygen via nasal cannula.  She was tachycardic and tachypneic in the emergency department.  Chest x-ray was negative for acute abnormalities.  Recent left shoulder surgery history on 4/25.  She had an elevated D-dimer therefore I added a CTA of her chest.  -There is no large or central PE she was found to have a small pulmonary nodule 5.1 mm on the left upper lobe.  -It is possible that she is requiring oxygen after receiving pain medication for right shoulder pain.  -I added procalcitonin which is also negative.  Her viral panel is negative and I will hold off ordering antibiotics for now and closely monitor.  -Patient has history of COPD but currently not wheezing.  Will have RT also working with her.    5/21: CT scan also reported atelectasis which could be the cause due to recent surgery.  We will encourage incentive spirometer.    5/22: Oxygen demand is improving, we will continue with incentive spirometer.

## 2024-05-21 NOTE — ED NOTES
Pt up to bedside to commode with daughter. Daughter is pt caregiver 24/7 so she knows how pt moves.

## 2024-05-21 NOTE — ED NOTES
Patient rounded on while assisting with patient care. Patient family updated by ERP regarding POC. Patient troponin drawn at this time.

## 2024-05-21 NOTE — PROGRESS NOTES
"Hospital Medicine Daily Progress Note    Date of Service  5/21/2024    Chief Complaint  Navya Mcbride is a 89 y.o. female admitted 5/20/2024 with generalized weakness, shoulder pain    Hospital Course  As per chart review:  \"89 y.o. female with history of dementia, GERD, COPD/asthma and recent left total shoulder arthroplasty done by Dr. Matute on 4/25/2024.  She presented to the emergency department accompanied by her daughter on 5/20/2024 with 1 day history of worsening right shoulder pain, on her arm that she did not had surgery.  Over the course of the day, also has progressive worsening shortness of breath and occasional chest pain reason why came to the ED for further evaluation.  She denies fever.  No wheezing.\"    Interval Problem Update  5/21: Patient seen at bedside this morning.  Patient seems to be pleasantly confused, however as per the daughter the patient is at baseline pleasantly confused.  It seems the patient has a history of dementia.  The patient with shoulder pain, and generalized weakness.  CT scan also reported atelectasis.  Will encourage incentive spirometer.  PT/OT evaluation.  Continue to monitor closely.    I have discussed this patient's plan of care and discharge plan at IDT rounds today with Case Management, Nursing, Nursing leadership, and other members of the IDT team.    Consultants/Specialty  None for now    Code Status  Full Code    Disposition  The patient is not medically cleared for discharge to home or a post-acute facility.        Review of Systems  Review of Systems   Constitutional:  Positive for malaise/fatigue. Negative for chills and fever.   HENT:  Negative for hearing loss and nosebleeds.    Eyes:  Negative for blurred vision and double vision.   Respiratory:  Negative for cough and shortness of breath.    Cardiovascular:  Negative for chest pain and palpitations.   Gastrointestinal:  Negative for abdominal pain, heartburn and vomiting.   Genitourinary:  " Negative for dysuria and urgency.   Musculoskeletal:  Positive for joint pain. Negative for back pain.   Skin:  Negative for itching and rash.   Neurological:  Positive for weakness (generalized). Negative for dizziness and headaches.   Psychiatric/Behavioral:  Negative for substance abuse. The patient is not nervous/anxious.    All other systems reviewed and are negative.       Physical Exam  Temp:  [37.2 °C (99 °F)] 37.2 °C (99 °F)  Pulse:  [73-97] 79  Resp:  [15-27] 15  BP: ()/(46-91) 108/51  SpO2:  [87 %-100 %] 95 %    Physical Exam  Vitals and nursing note reviewed.   Constitutional:       General: She is not in acute distress.     Appearance: She is ill-appearing.   HENT:      Head: Normocephalic and atraumatic.      Right Ear: External ear normal.      Left Ear: External ear normal.      Mouth/Throat:      Pharynx: No oropharyngeal exudate or posterior oropharyngeal erythema.   Eyes:      General:         Right eye: No discharge.         Left eye: No discharge.   Cardiovascular:      Rate and Rhythm: Normal rate and regular rhythm.      Pulses: Normal pulses.      Heart sounds: No murmur heard.     No gallop.   Pulmonary:      Effort: Pulmonary effort is normal. No respiratory distress.      Breath sounds: Normal breath sounds. No wheezing or rhonchi.   Abdominal:      General: Bowel sounds are normal. There is no distension.      Palpations: Abdomen is soft.      Tenderness: There is no abdominal tenderness. There is no guarding.   Musculoskeletal:         General: No swelling or tenderness. Normal range of motion.      Cervical back: Normal range of motion and neck supple. No tenderness.   Skin:     General: Skin is warm and dry.   Neurological:      General: No focal deficit present.      Mental Status: She is alert. Mental status is at baseline. She is disoriented.      Motor: No weakness.      Comments: Patient seems to be confused at baseline         Fluids  No intake or output data in the 24  hours ending 05/21/24 1234    Laboratory  Recent Labs     05/20/24 1942 05/21/24  0321   WBC 4.3* 4.0*   RBC 4.27 3.65*   HEMOGLOBIN 12.7 10.9*   HEMATOCRIT 39.0 33.7*   MCV 91.3 92.3   MCH 29.7 29.9   MCHC 32.6 32.3   RDW 47.8 48.0   PLATELETCT 241 198   MPV 10.4 10.5     Recent Labs     05/20/24 1942 05/21/24  0321   SODIUM 138 138   POTASSIUM 3.9 3.6   CHLORIDE 104 108   CO2 23 22   GLUCOSE 118* 99   BUN 20 19   CREATININE 0.66 0.56   CALCIUM 9.4 7.8*                   Imaging  CT-CTA CHEST PULMONARY ARTERY W/ RECONS   Final Result         1.  No large central pulmonary embolus is appreciated, evaluation of the subsegmental branches is essentially nondiagnostic due to motion artifacts. Additional imaging would be required for definitive exclusion of small distal pulmonary emboli.   2.  Multiple low-density hepatic lesions, appearance suggesting hepatic cysts, otherwise indeterminate.   3.  Pulmonary nodule, see nodule follow-up recommendations below.      Fleischner Society pulmonary nodule recommendations:      Low Risk: No routine follow-up      High Risk: Optional CT at 12 months      Comments: Nodules less than 6 mm do not require routine follow-up, but certain patients at high risk with suspicious nodule morphology, upper lobe location, or both may warrant 12-month follow-up.      Low Risk - Minimal or absent history of smoking and of other known risk factors.      High Risk - History of smoking or of other known risk factors.      Note: These recommendations do not apply to lung cancer screening, patients with immunosuppression, or patients with known primary cancer.      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      DX-SHOULDER 2+ RIGHT   Final Result      1. Progressive marked  osteoarthritic changes involving the right glenohumeral and right acromioclavicular joints.   2. No acute fracture.      DX-CHEST-PORTABLE (1 VIEW)   Final Result      1. No acute findings.   2. Postsurgical changes of reverse left total shoulder arthroplasty.   3. Progressive marked degenerative changes in the right glenohumeral joint.           Assessment/Plan  * Hypoxia- (present on admission)  Assessment & Plan  -Inpatient status to telemetry.  -Patient has URI symptoms and currently requiring 3 L of oxygen via nasal cannula.  She was tachycardic and tachypneic in the emergency department.  Chest x-ray was negative for acute abnormalities.  Recent left shoulder surgery history on 4/25.  She had an elevated D-dimer therefore I added a CTA of her chest.  -There is no large or central PE she was found to have a small pulmonary nodule 5.1 mm on the left upper lobe.  -It is possible that she is requiring oxygen after receiving pain medication for right shoulder pain.  -I added procalcitonin which is also negative.  Her viral panel is negative and I will hold off ordering antibiotics for now and closely monitor.  -Patient has history of COPD but currently not wheezing.  Will have RT also working with her.    5/21: CT scan also reported atelectasis which could be the cause due to recent surgery.  We will encourage incentive spirometer.    Dementia (HCC)  Assessment & Plan  As per the daughter patient with a history of dementia.  The patient is pleasantly confused, however at baseline the patient is also pleasantly confused.  Continue to monitor closely.    Anemia  Assessment & Plan  No signs of overt bleeding  monitor    Right shoulder pain  Assessment & Plan  -Patient reports having right shoulder pain.  Recent left total shoulder arthroplasty surgery by Dr. Matute on 4/25/2024.  -Patient had shoulder x-ray showing marked osteoarthritic changes involving right glomerular Que and right acromioclavicular joints but  no acute fracture  -Patient reports significant improvement of her pain after receiving fentanyl.  -I will hold off if possible giving IV narcotic medication due to hypoxia but she might need some pain control for underlying osteoarthritis.  -Will have to closely monitor respiratory status after she received fentanyl.    5/21: PT/OT evaluation    Glaucoma- (present on admission)  Assessment & Plan  -Order latanoprost.    GERD (gastroesophageal reflux disease)- (present on admission)  Assessment & Plan  -Continue omeprazole.    History of pulmonary embolism - 2009 after knee surgery- (present on admission)  Assessment & Plan  -She was previously anticoagulated but currently taking Plavix.    Memory loss- (present on admission)  Assessment & Plan  -Patient takes Xanax, Seroquel, duloxetine and carbamazepine for behavior control.  She is pleasant and talkative in the emergency department.  All her medications were ordered.         VTE prophylaxis: SCDs    I have performed a physical exam and reviewed and updated ROS and Plan today (5/21/2024). In review of yesterday's note (5/20/2024), there are no changes except as documented above.      I spend at least 51 minutes providing care for this patient.  This includes face-to-face interview, physical examination.  Review of lab work including CBC, BMP, lactic acid.  Discussion with multidisciplinary team including case management, nursing staff and pharmacy.  Creating plan of care, reviewing orders.

## 2024-05-21 NOTE — PROGRESS NOTES
4 Eyes Skin Assessment Completed by MADHAVI Restrepo and MADHAVI Abebe.    Head WDL  Ears WDL  Nose WDL  Mouth WDL  Neck WDL  Breast/Chest WDL  Shoulder Blades WDL  Spine WDL  (R) Arm/Elbow/Hand WDL  (L) Arm/Elbow/Hand Healing surgical incision from left total shoulder in April  Abdomen WDL  Groin WDL  Scrotum/Coccyx/Buttocks WDL  (R) Leg WDL  (L) Leg WDL  (R) Heel/Foot/Toe WDL  (L) Heel/Foot/Toe WDL          Devices In Places Tele Box and Nasal Cannula      Interventions In Place Gray Ear Foams, Pillows, and Pressure Redistribution Mattress    Possible Skin Injury No    Pictures Uploaded Into Epic N/A  Wound Consult Placed N/A  RN Wound Prevention Protocol Ordered No

## 2024-05-21 NOTE — H&P
Hospital Medicine History & Physical Note    Date of Service  5/20/2024    Primary Care Physician  Thom Rodriguez M.D.    Consultants  None    Code Status  Full Code    Chief Complaint  Chief Complaint   Patient presents with    Chest Pain     L    Shortness of Breath     And cough    Shoulder Pain     L. Pt. Family reports they are unsure if she has recently fallen. Reports recent sx to L shoulder last month.        History of Presenting Illness  Navya Mcbride is a 89 y.o. female with history of dementia, GERD, COPD/asthma and recent left total shoulder arthroplasty done by Dr. Matute on 4/25/2024.  She presented to the emergency department accompanied by her daughter on 5/20/2024 with 1 day history of worsening right shoulder pain, on her arm that she did not had surgery.  Over the course of the day, also has progressive worsening shortness of breath and occasional chest pain reason why came to the ED for further evaluation.  She denies fever.  No wheezing..    I discussed the plan of care with patient and ERP Dr. Strauss .    Review of Systems  Review of Systems   Constitutional:  Positive for malaise/fatigue. Negative for fever.   HENT:  Negative for congestion and sore throat.    Eyes:  Negative for blurred vision and double vision.   Respiratory:  Positive for shortness of breath. Negative for cough.    Cardiovascular:  Positive for chest pain. Negative for palpitations.   Gastrointestinal:  Negative for nausea and vomiting.   Genitourinary:  Negative for dysuria and urgency.   Musculoskeletal:  Negative for myalgias and neck pain.        Right shoulder pain.   Skin:  Negative for itching and rash.   Neurological:  Negative for dizziness, weakness and headaches.   Endo/Heme/Allergies:  Does not bruise/bleed easily.   Psychiatric/Behavioral:  Negative for depression. The patient does not have insomnia.        Past Medical History   has a past medical history of Anemia, Anesthesia, Arthritis,  Asthma, Backpain, Breath shortness, CATARACT, Clostridium difficile infection (01/01/2009), Coarse tremors, Colitis, Dental disorder, Fibromyalgia, Fibromyalgia, primary, Gastritis, Glaucoma, Heart burn, Heart disease, Hiatus hernia syndrome, High cholesterol, History of pulmonary embolism - 2009 after knee surgery, Indigestion, Myocardial infarct (HCC) (05/01/2015), Osteoporosis, Other specified disorder of intestines, Pain, Pneumonia, Psychiatric problem, Pulmonary embolism (HCC), Sleep apnea, Stroke (HCC) (01/01/2009), Supplemental oxygen dependent, and Urinary bladder disorder.    Surgical History   has a past surgical history that includes erendira by laparoscopy (04/19/2009); rotator cuff repair (01/01/2000); hysterectomy, total abdominal (01/01/1973); knee arthroscopy (10/08/2009); meniscectomy, knee, medial (10/08/2009); meniscectomy (10/08/2009); recovery (06/11/2015); orif, hip (12/25/22); cataract extraction with iol (Bilateral); and pr reconstr total shoulder implant (Left, 4/25/2024).     Family History  family history includes Anesthesia in her father; Cancer in an other family member; Diabetes in an other family member; Heart Disease in an other family member; Hypertension in an other family member; Stroke in an other family member.   Family history reviewed with patient. There is no family history that is pertinent to the chief complaint.     Social History   reports that she has never smoked. She has never used smokeless tobacco. She reports that she does not drink alcohol and does not use drugs.    Allergies  Allergies   Allergen Reactions    Contrast Media With Iodine [Iodine] Shortness of Breath and Itching     Dyspnea, throat closing- can use benadryl to minimize rxn if necessary     Tequin [Gatifloxacin Sesquihydrate] Hives     Severe rash and SOB       Medications  Prior to Admission Medications   Prescriptions Last Dose Informant Patient Reported? Taking?   ALPRAZolam (XANAX) 0.5 MG Tab   Yes No    Sig: Take 0.5 mg by mouth at bedtime as needed for Sleep.   COMBIVENT RESPIMAT  MCG/ACT Aero Soln   Yes No   Sig: Inhale 1 Puff as needed (takes as needed SOB).   Coenzyme Q10 300 MG Cap   No No   Sig: Take 1 Capsule by mouth every day.   DULoxetine (CYMBALTA) 60 MG Cap DR Particles delayed-release capsule   Yes No   Sig: Take 60 mg by mouth every day. AM   HYDROcodone-acetaminophen (NORCO) 7.5-325 MG tab   Yes No   Sig: Take 1 Tablet by mouth every 6 hours as needed for Moderate Pain.   LORazepam (ATIVAN) 0.5 MG Tab   Yes No   Sig: TAKE 1 TABLET ORALLY TWICE A DAY AS NEEDED FOR 30 DAYS F41.9   QUEtiapine (SEROQUEL) 50 MG tablet   Yes No   Sig: Take 50 mg by mouth at bedtime.   albuterol 108 (90 Base) MCG/ACT Aero Soln inhalation aerosol  Family Member Yes No   Sig: Inhale 2 Puffs by mouth every 6 hours as needed for Shortness of Breath.   budesonide-formoterol (SYMBICORT) 80-4.5 MCG/ACT Aerosol   Yes No   Sig: Inhale 2 Puffs by mouth 2 Times a Day.   carbamazepine (CARBATROL) 200 MG CR capsule   Yes No   Sig: Take 200 mg by mouth every evening.   celecoxib (CELEBREX) 200 MG Cap  Family Member Yes No   Sig: Take 200 mg by mouth 2 times a day.   clopidogrel (PLAVIX) 75 MG Tab   Yes No   Sig: Take 75 mg by mouth every day.   cyclosporin (RESTASIS) 0.05 % ophthalmic emulsion  Family Member Yes No   Sig: Place 1 Drop in both eyes 2 times a day.   latanoprost (XALATAN) 0.005 % Solution   Yes No   Sig: INSTILL 1 DROP INTO AFFECTED EYE(S) BY OPHTHALMIC ROUTE ONCE DAILY IN THE EVENING   metoprolol (LOPRESSOR) 25 MG Tab   No No   Sig: Take 0.5 Tabs by mouth 2 times a day.   multivitamin Tab   Yes No   Sig: Take 1 Tablet by mouth every day.   nitroglycerin (NITROSTAT) 0.4 MG SL Tab   No No   Sig: Place 1 Tablet under the tongue as needed for Chest Pain.   oxyCODONE immediate-release (ROXICODONE) 5 MG Tab   Yes No   Sig: Take 7.5 mg by mouth every four hours as needed for Severe Pain.   pantoprazole (PROTONIX) 40 MG  Tablet Delayed Response   Yes No   Si TABLET ORALLY ONCE A DAY 30 MINUTES AFTER A MEAL 90 DAYS   rosuvastatin (CRESTOR) 20 MG Tab   No No   Sig: Take 1 Tablet by mouth every evening.      Facility-Administered Medications: None       Physical Exam  Temp:  [37.2 °C (99 °F)] 37.2 °C (99 °F)  Pulse:  [89-97] 89  Resp:  [20-27] 27  BP: (142)/(91) 142/91  SpO2:  [87 %-93 %] 93 %  Blood Pressure : (!) 142/91   Temperature: 37.2 °C (99 °F)   Pulse: 89   Respiration: (!) 27   Pulse Oximetry: 93 %       Physical Exam  Constitutional:       Appearance: Normal appearance.   HENT:      Head: Normocephalic and atraumatic.      Mouth/Throat:      Mouth: Mucous membranes are moist.      Pharynx: Oropharynx is clear.   Eyes:      Extraocular Movements: Extraocular movements intact.      Pupils: Pupils are equal, round, and reactive to light.   Cardiovascular:      Rate and Rhythm: Regular rhythm. Tachycardia present.      Heart sounds: Normal heart sounds.   Pulmonary:      Effort: Pulmonary effort is normal.      Breath sounds: Normal breath sounds.   Abdominal:      General: Abdomen is flat. Bowel sounds are normal.      Palpations: Abdomen is soft.   Musculoskeletal:      Cervical back: Normal range of motion and neck supple.   Skin:     General: Skin is warm and dry.   Neurological:      General: No focal deficit present.      Mental Status: She is alert and oriented to person, place, and time.   Psychiatric:         Mood and Affect: Mood normal.         Behavior: Behavior normal.         Laboratory:  Recent Labs     24   WBC 4.3*   RBC 4.27   HEMOGLOBIN 12.7   HEMATOCRIT 39.0   MCV 91.3   MCH 29.7   MCHC 32.6   RDW 47.8   PLATELETCT 241   MPV 10.4     Recent Labs     24   SODIUM 138   POTASSIUM 3.9   CHLORIDE 104   CO2 23   GLUCOSE 118*   BUN 20   CREATININE 0.66   CALCIUM 9.4     Recent Labs     24   ALTSGPT 17   ASTSGOT 21   ALKPHOSPHAT 131*   TBILIRUBIN <0.2   GLUCOSE 118*          Recent Labs     05/20/24 1942   NTPROBNP 57         Recent Labs     05/20/24 1942 05/20/24  2142   TROPONINT 16 15       Imaging:  CT-CTA CHEST PULMONARY ARTERY W/ RECONS   Final Result         1.  No large central pulmonary embolus is appreciated, evaluation of the subsegmental branches is essentially nondiagnostic due to motion artifacts. Additional imaging would be required for definitive exclusion of small distal pulmonary emboli.   2.  Multiple low-density hepatic lesions, appearance suggesting hepatic cysts, otherwise indeterminate.   3.  Pulmonary nodule, see nodule follow-up recommendations below.      Fleischner Society pulmonary nodule recommendations:      Low Risk: No routine follow-up      High Risk: Optional CT at 12 months      Comments: Nodules less than 6 mm do not require routine follow-up, but certain patients at high risk with suspicious nodule morphology, upper lobe location, or both may warrant 12-month follow-up.      Low Risk - Minimal or absent history of smoking and of other known risk factors.      High Risk - History of smoking or of other known risk factors.      Note: These recommendations do not apply to lung cancer screening, patients with immunosuppression, or patients with known primary cancer.      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      DX-SHOULDER 2+ RIGHT   Final Result      1. 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   2. No acute fracture.      DX-CHEST-PORTABLE (1 VIEW)   Final Result      1. No acute findings.   2. Postsurgical changes of reverse left total shoulder arthroplasty.   3. Progressive marked degenerative changes in the right glenohumeral joint.          X-Ray:   I have personally reviewed the images and compared with prior images. and My impression is: Chest x-ray is normal.  Right shoulder and elbow x-ray negative for traumatic injury but showing chronic osteoarthritic changes.  CTA of the chest is negative for PE, showed eluding a very small 5.1 mm nodule to the left upper lobe and multiple hepatic low-density lesions.  EKG:  I have personally reviewed the images and compared with prior images. and My impression is: Sinus tachycardia at 96 bpm.  Left axis deviation and no acute ST elevation.    Assessment/Plan:  Justification for Admission Status  I anticipate this patient will require at least two midnights for appropriate medical management, necessitating inpatient admission because 89-year-old female, history of dementia coming with URI symptoms and shortness of breath, she is hypoxic at 3 L, had SIRS criteria      * Hypoxia- (present on admission)  Assessment & Plan  -Inpatient status to telemetry.  -Patient has URI symptoms and currently requiring 3 L of oxygen via nasal cannula.  She was tachycardic and tachypneic in the emergency department.  Chest x-ray was negative for acute abnormalities.  Recent left shoulder surgery history on 4/25.  She had an elevated D-dimer therefore I added a CTA of her chest.  -There is no large or central PE she was found to have a small pulmonary nodule 5.1 mm on the left upper lobe.  -It is possible that she is requiring oxygen after receiving pain medication for right shoulder pain.  -I added procalcitonin which is also negative.  Her viral panel is negative and I will hold off ordering antibiotics for now and closely monitor.  -Patient has history of COPD but currently not wheezing.  Will have RT also working with her.    Right shoulder pain  Assessment & Plan  -Patient reports having right shoulder pain.  Recent left total shoulder arthroplasty surgery by Dr. Matute on 4/25/2024.  -Patient had shoulder x-ray showing marked  osteoarthritic changes involving right glomerular Que and right acromioclavicular joints but no acute fracture  -Patient reports significant improvement of her pain after receiving fentanyl.  -I will hold off if possible giving IV narcotic medication due to hypoxia but she might need some pain control for underlying osteoarthritis.  -Will have to closely monitor respiratory status after she received fentanyl.    Glaucoma- (present on admission)  Assessment & Plan  -Order latanoprost.    GERD (gastroesophageal reflux disease)- (present on admission)  Assessment & Plan  -Continue omeprazole.    History of pulmonary embolism - 2009 after knee surgery- (present on admission)  Assessment & Plan  -She was previously anticoagulated but currently taking Plavix.    Memory loss- (present on admission)  Assessment & Plan  -Patient takes Xanax, Seroquel, duloxetine and carbamazepine for behavior control.  She is pleasant and talkative in the emergency department.  All her medications were ordered.        VTE prophylaxis: SCDs/TEDs

## 2024-05-21 NOTE — ED NOTES
Med rec is complete per family at bedside.     Allergies reviewed.    Has patient had any outside antibiotics in the last 30 days? N    Any Anticoagulants (rivaroxaban, apixaban, edoxaban, dabigatran, warfarin, enoxaparin) taken in the last 14 days? N     Pt is not on Chemo or Dialysis.      Pharmacy/Pharmacies Pt utilizes : [facility] [phone]

## 2024-05-21 NOTE — ED NOTES
Pt is a 2 person assist to the bedside commode. Pt back to bed and medicated per MAR, all questions answered.

## 2024-05-21 NOTE — ED PROVIDER NOTES
ED Provider Note    CHIEF COMPLAINT  Chief Complaint   Patient presents with    Chest Pain     L    Shortness of Breath     And cough    Shoulder Pain     L. Pt. Family reports they are unsure if she has recently fallen. Reports recent sx to L shoulder last month.        EXTERNAL RECORDS REVIEWED  Outpatient Notes reviewed office visit progress note dated May 10, 2024 by Dr. Matute of orthopedic surgery.  Patient is 2 weeks status post total shoulder.  Mostly offer pain meds.  Plan for seen back in 4 weeks with repeat x-rays.    HPI/ROS  LIMITATION TO HISTORY   Select: Language Turkmen,  Used   OUTSIDE HISTORIAN(S):  Family daughters aid in the history    Navya Mcbride is a 89 y.o. female who presents for evaluation of shortness of breath and fall.  Family relate the patient tripped and fell yesterday on the right upper extremity.  She has had surgery with Dr. Matute to the left shoulder last month and has been doing well with regard to her postoperative pain.  However she has now injured the right upper extremity and family noted a large bruise as well to the right upper arm.  Pain was worse over the evening making difficult for her to sleep.  In addition patient has had difficulty breathing more so at night as well.  She has a history of sleep apnea for which she uses supplemental oxygen only at night but does not use oxygen during the day.  Congested cough with sputum production.  Patient notes pain to the chest as well localized to the left chest with radiation to the right.  No known fever but family note chills.  Patient otherwise generally fatigued and feeling ill and weak since the fall.  Patient takes celecoxib but is not anticoagulated.    PAST MEDICAL HISTORY   has a past medical history of Anemia, Anesthesia, Arthritis, Asthma, Backpain, Breath shortness, CATARACT, Clostridium difficile infection (01/01/2009), Coarse tremors, Colitis, Dental disorder, Fibromyalgia, Fibromyalgia,  primary, Gastritis, Glaucoma, Heart burn, Heart disease, Hiatus hernia syndrome, High cholesterol, History of pulmonary embolism - 2009 after knee surgery, Indigestion, Myocardial infarct (HCC) (05/01/2015), Osteoporosis, Other specified disorder of intestines, Pain, Pneumonia, Psychiatric problem, Pulmonary embolism (HCC), Sleep apnea, Stroke (HCC) (01/01/2009), Supplemental oxygen dependent, and Urinary bladder disorder.    SURGICAL HISTORY   has a past surgical history that includes erendira by laparoscopy (04/19/2009); rotator cuff repair (01/01/2000); hysterectomy, total abdominal (01/01/1973); knee arthroscopy (10/08/2009); meniscectomy, knee, medial (10/08/2009); meniscectomy (10/08/2009); recovery (06/11/2015); orif, hip (12/25/22); cataract extraction with iol (Bilateral); and reconstr total shoulder implant (Left, 4/25/2024).    FAMILY HISTORY  Family History   Problem Relation Age of Onset    Anesthesia Father         difficulty waking post anesthesia    Diabetes Other     Heart Disease Other     Hypertension Other     Stroke Other     Cancer Other        SOCIAL HISTORY  Social History     Tobacco Use    Smoking status: Never    Smokeless tobacco: Never    Tobacco comments:     As a teenager   Vaping Use    Vaping status: Never Used   Substance and Sexual Activity    Alcohol use: No    Drug use: No    Sexual activity: Not on file       CURRENT MEDICATIONS  Home Medications       Reviewed by Barbara Moon R.N. (Registered Nurse) on 05/20/24 at 1834  Med List Status: Not Addressed     Medication Last Dose Status   albuterol 108 (90 Base) MCG/ACT Aero Soln inhalation aerosol  Active   ALPRAZolam (XANAX) 0.5 MG Tab  Active   budesonide-formoterol (SYMBICORT) 80-4.5 MCG/ACT Aerosol  Active   carbamazepine (CARBATROL) 200 MG CR capsule  Active   celecoxib (CELEBREX) 200 MG Cap  Active   clopidogrel (PLAVIX) 75 MG Tab  Active   Coenzyme Q10 300 MG Cap  Active   COMBIVENT RESPIMAT  MCG/ACT Aero Soln   "Active   cyclosporin (RESTASIS) 0.05 % ophthalmic emulsion  Active   DULoxetine (CYMBALTA) 60 MG Cap DR Particles delayed-release capsule  Active   gabapentin (NEURONTIN) 300 MG Cap  Active   HYDROcodone-acetaminophen (NORCO) 7.5-325 MG tab  Active   latanoprost (XALATAN) 0.005 % Solution  Active   LORazepam (ATIVAN) 0.5 MG Tab  Active   metoprolol (LOPRESSOR) 25 MG Tab  Active   multivitamin Tab  Active   nitroglycerin (NITROSTAT) 0.4 MG SL Tab  Active   oxyCODONE immediate-release (ROXICODONE) 5 MG Tab  Active   pantoprazole (PROTONIX) 40 MG Tablet Delayed Response  Active   QUEtiapine (SEROQUEL) 50 MG tablet  Active   rosuvastatin (CRESTOR) 20 MG Tab  Active                  Audit from Redirected Encounters    **Home medications have not yet been reviewed for this encounter**         ALLERGIES  Allergies   Allergen Reactions    Contrast Media With Iodine [Iodine] Shortness of Breath and Itching     Dyspnea, throat closing- can use benadryl to minimize rxn if necessary     Tequin [Gatifloxacin Sesquihydrate] Hives     Severe rash and SOB       PHYSICAL EXAM  VITAL SIGNS: BP (!) 142/91   Pulse 89   Temp 37.2 °C (99 °F) (Temporal)   Resp (!) 27   Ht 1.575 m (5' 2\")   Wt 60.7 kg (133 lb 13.1 oz)   SpO2 93%   BMI 24.48 kg/m²    General: Alert, mild acute distress  Skin: Warm, dry, normal for ethnicity  Head: Normocephalic, atraumatic  Neck: Trachea midline  ENMT: Oral mucosa pink and dry  Cardiovascular: Regular rate and rhythm, No murmur, Normal peripheral perfusion  Respiratory: Lungs demonstrate mild expiratory wheezing crackles throughout, respirations are mildly labored and tachypneic, breath sounds are equal  Musculoskeletal: Hematoma and tenderness to palpation at the right glenohumeral joint, midshaft of the humerus, medial and lateral aspects of the elbow, and less so to the right wrist.  No radha deformity.  2+ radial pulse.  Neurological: Alert and oriented to person, place, time, and situation.  " Flexion and extension of digits of right hand grossly intact, sensation to pain and light touch throughout right upper extremity is grossly intact.  Lymphatics: No lymphadenopathy  Psychiatric: Cooperative, appropriate mood & affect     EKG/LABS  Results for orders placed or performed during the hospital encounter of 05/20/24   CBC with Differential   Result Value Ref Range    WBC 4.3 (L) 4.8 - 10.8 K/uL    RBC 4.27 4.20 - 5.40 M/uL    Hemoglobin 12.7 12.0 - 16.0 g/dL    Hematocrit 39.0 37.0 - 47.0 %    MCV 91.3 81.4 - 97.8 fL    MCH 29.7 27.0 - 33.0 pg    MCHC 32.6 32.2 - 35.5 g/dL    RDW 47.8 35.9 - 50.0 fL    Platelet Count 241 164 - 446 K/uL    MPV 10.4 9.0 - 12.9 fL    Neutrophils-Polys 62.10 44.00 - 72.00 %    Lymphocytes 26.20 22.00 - 41.00 %    Monocytes 7.50 0.00 - 13.40 %    Eosinophils 3.50 0.00 - 6.90 %    Basophils 0.50 0.00 - 1.80 %    Immature Granulocytes 0.20 0.00 - 0.90 %    Nucleated RBC 0.00 0.00 - 0.20 /100 WBC    Neutrophils (Absolute) 2.66 1.82 - 7.42 K/uL    Lymphs (Absolute) 1.12 1.00 - 4.80 K/uL    Monos (Absolute) 0.32 0.00 - 0.85 K/uL    Eos (Absolute) 0.15 0.00 - 0.51 K/uL    Baso (Absolute) 0.02 0.00 - 0.12 K/uL    Immature Granulocytes (abs) 0.01 0.00 - 0.11 K/uL    NRBC (Absolute) 0.00 K/uL   Complete Metabolic Panel (CMP)   Result Value Ref Range    Sodium 138 135 - 145 mmol/L    Potassium 3.9 3.6 - 5.5 mmol/L    Chloride 104 96 - 112 mmol/L    Co2 23 20 - 33 mmol/L    Anion Gap 11.0 7.0 - 16.0    Glucose 118 (H) 65 - 99 mg/dL    Bun 20 8 - 22 mg/dL    Creatinine 0.66 0.50 - 1.40 mg/dL    Calcium 9.4 8.4 - 10.2 mg/dL    Correct Calcium 9.6 8.5 - 10.5 mg/dL    AST(SGOT) 21 12 - 45 U/L    ALT(SGPT) 17 2 - 50 U/L    Alkaline Phosphatase 131 (H) 30 - 99 U/L    Total Bilirubin <0.2 0.1 - 1.5 mg/dL    Albumin 3.7 3.2 - 4.9 g/dL    Total Protein 6.9 6.0 - 8.2 g/dL    Globulin 3.2 1.9 - 3.5 g/dL    A-G Ratio 1.2 g/dL   Troponins NOW   Result Value Ref Range    Troponin T 16 6 - 19 ng/L    Troponins in two (2) hours   Result Value Ref Range    Troponin T 15 6 - 19 ng/L   LACTIC ACID   Result Value Ref Range    Lactic Acid 1.3 0.5 - 2.0 mmol/L   proBrain Natriuretic Peptide, NT   Result Value Ref Range    NT-proBNP 57 0 - 125 pg/mL   CoV-2, FLU A/B, and RSV by PCR (2-4 Hours CEPHEID) : Collect NP swab in VTM    Specimen: Nasal; Respirate   Result Value Ref Range    Influenza virus A RNA Negative Negative    Influenza virus B, PCR Negative Negative    RSV, PCR Negative Negative    SARS-CoV-2 by PCR NotDetected     SARS-CoV-2 Source NP Swab    ESTIMATED GFR   Result Value Ref Range    GFR (CKD-EPI) 84 >60 mL/min/1.73 m 2   PROCALCITONIN   Result Value Ref Range    Procalcitonin 0.06 <0.25 ng/mL   D-DIMER   Result Value Ref Range    D-Dimer 2.63 (H) 0.00 - 0.50 ug/mL (FEU)   EKG   Result Value Ref Range    Report       [facility]    Test Date:  2024  Pt Name:    AISHA PAGAN              Department: [facility]  MRN:        [id number]                      Room:  Gender:     Female                       Technician: [id number]  :        [id number]                   Requested By:ER TRIAGE PROTOCOL  Order #:    [id number]                    Reading MD: NIURKA GONSALVES MD    Measurements  Intervals                                Axis  Rate:       96                           P:          0  NC:         0                            QRS:        -39  QRSD:       82                           T:          15  QT:         353  QTc:        447    Interpretive Statements  Normal sinus rhythm with motion artifact  Left axis deviation  Compared to ECG 01/10/2024 13:51:25  Right ventricular hypertrophy no longer present  Electronically Signed On 2024 19:25:54 PDT by NIURKA GONSALVES MD        I have independently interpreted this EKG    RADIOLOGY/PROCEDURES   I have independently interpreted the diagnostic imaging associated with this visit and am waiting the final  reading from the radiologist.   My preliminary interpretation is as follows: No organized lobar infiltrate on chest x-ray    Radiologist interpretation:  DX-ELBOW-LIMITED 2- RIGHT   Final Result      Osteoarthritic changes involving the right elbow joint, without acute fracture, subluxation or joint effusion.      DX-SHOULDER 2+ RIGHT   Final Result      1. 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   2. No acute fracture.      DX-CHEST-PORTABLE (1 VIEW)   Final Result      1. No acute findings.   2. Postsurgical changes of reverse left total shoulder arthroplasty.   3. Progressive marked degenerative changes in the right glenohumeral joint.      CT-CTA CHEST PULMONARY ARTERY W/ RECONS    (Results Pending)       COURSE & MEDICAL DECISION MAKING    ASSESSMENT, COURSE AND PLAN  Care Narrative: Patient is a very pleasant 89-year-old female with history of coronary artery disease who presents for evaluation of dyspnea but congested cough and mechanical ground-level fall.  History and exam as above.  With regard to the injury thankfully she is neurovascular intact but does have limited range of motion in the right upper extremity and a hematoma, as such clear indication for x-ray imaging.  Thankfully this unremarkable, suspect likely contusion exacerbating degenerative joint disease.  With regard to her congestive cough, dyspnea, and chest discomfort I suspect perhaps a viral respiratory infection, she is indeed hypoxic and has mild leukopenia consistent with this differential diagnosis.  However her D-dimer is elevated and she has a history of previous thromboembolic disease and fairly recent surgery.  As such started pulmonary embolism must also be considered and CTA will be obtained.  Given hypoxia in above clinical context clear indication for inpatient management.    Chest Pain: Heart score 4, moderate risk stratification  ED OBS: Yes; I am placing  "the patient in to an observation status due to a diagnostic uncertainty as well as therapeutic intensity. Patient placed in observation status at 7:24 PM, 5/20/2024.     Observation plan is as follows: Patient last had Norco at home approximately 11 hours ago.  Still in pain/have ordered fentanyl IV 25 mcg.  Cardiac workup and chest x-ray as well as blood cultures and viral PCRs will be obtained.  Have also ordered x-ray imaging of the right shoulder and right elbow.  Differential diagnosis at this point includes but is not restricted to fracture, contusion, hematoma, lower respiratory infection, acute coronary syndrome    2212: I have spoken with the hospitalist Dr. Liss Duenas who concurs with plan of care thus far and accepts the patient to her service for admission in improved but guarded condition.    2318: D-dimer is indeed elevated, CTA has been ordered by hospital medicine service.    Upon Reevaluation, the patient's condition has: not improved; and will be escalated to hospitalization.    Patient discharged from ED Observation status at 2212 (Time) 5/20/24 (Date).       Patient Vitals for the past 24 hrs:   BP Temp Temp src Pulse Resp SpO2 Height Weight   05/20/24 2108 -- -- -- 89 (!) 27 93 % -- --   05/20/24 1832 -- -- -- -- -- 92 % -- --   05/20/24 1827 (!) 142/91 37.2 °C (99 °F) Temporal 97 20 (!) 87 % -- --   05/20/24 1826 -- -- -- -- -- -- 1.575 m (5' 2\") 60.7 kg (133 lb 13.1 oz)        ADDITIONAL PROBLEMS MANAGED  Congested cough, dyspnea, hypoxia, arm contusion, chest pain    DISPOSITION AND DISCUSSIONS  I have discussed management of the patient with the following physicians and VERENA's:  I have spoken with the hospitalist Dr. Liss Duenas who concurs with plan of care thus far and accepts the patient to her service for admission in improved but guarded condition.    Discussion of management with other Hospitals in [place] or appropriate source(s): None     Escalation of care considered, and ultimately not " performed:IV fluids    Barriers to care at this time, including but not limited to:  NA .     Decision tools and prescription drugs considered including, but not limited to:  SIRS criteria for sepsis not met, heart score discussed above .    FINAL DIAGNOSIS  1. Acute lower respiratory infection    2. Hypoxia    3. Contusion of multiple sites of right upper arm, initial encounter           Electronically signed by: Samy Strauss M.D., 5/20/2024 7:22 PM

## 2024-05-21 NOTE — PROGRESS NOTES
Patient arrived on unit from ED. Patient is Burkinan speaking only, patient's daughter is at bedside. Oriented to room and call light system. Bed alarm on.

## 2024-05-22 PROBLEM — R53.1 GENERALIZED WEAKNESS: Status: ACTIVE | Noted: 2024-05-22

## 2024-05-22 PROBLEM — R19.7 DIARRHEA: Status: ACTIVE | Noted: 2024-05-22

## 2024-05-22 PROBLEM — R78.81 POSITIVE BLOOD CULTURE: Status: ACTIVE | Noted: 2024-05-22

## 2024-05-22 PROBLEM — J44.9 COPD (CHRONIC OBSTRUCTIVE PULMONARY DISEASE) (HCC): Status: ACTIVE | Noted: 2020-05-05

## 2024-05-22 LAB
ALBUMIN SERPL BCP-MCNC: 3.4 G/DL (ref 3.2–4.9)
ALBUMIN/GLOB SERPL: 1.3 G/DL
ALP SERPL-CCNC: 119 U/L (ref 30–99)
ALT SERPL-CCNC: 14 U/L (ref 2–50)
ANION GAP SERPL CALC-SCNC: 11 MMOL/L (ref 7–16)
AST SERPL-CCNC: 21 U/L (ref 12–45)
BILIRUB SERPL-MCNC: <0.2 MG/DL (ref 0.1–1.5)
BUN SERPL-MCNC: 21 MG/DL (ref 8–22)
CALCIUM ALBUM COR SERPL-MCNC: 9.3 MG/DL (ref 8.5–10.5)
CALCIUM SERPL-MCNC: 8.8 MG/DL (ref 8.4–10.2)
CHLORIDE SERPL-SCNC: 102 MMOL/L (ref 96–112)
CO2 SERPL-SCNC: 23 MMOL/L (ref 20–33)
CREAT SERPL-MCNC: 0.71 MG/DL (ref 0.5–1.4)
ERYTHROCYTE [DISTWIDTH] IN BLOOD BY AUTOMATED COUNT: 48 FL (ref 35.9–50)
GFR SERPLBLD CREATININE-BSD FMLA CKD-EPI: 82 ML/MIN/1.73 M 2
GLOBULIN SER CALC-MCNC: 2.7 G/DL (ref 1.9–3.5)
GLUCOSE SERPL-MCNC: 106 MG/DL (ref 65–99)
HCT VFR BLD AUTO: 36.1 % (ref 37–47)
HGB BLD-MCNC: 11.5 G/DL (ref 12–16)
MAGNESIUM SERPL-MCNC: 1.9 MG/DL (ref 1.5–2.5)
MCH RBC QN AUTO: 29.8 PG (ref 27–33)
MCHC RBC AUTO-ENTMCNC: 31.9 G/DL (ref 32.2–35.5)
MCV RBC AUTO: 93.5 FL (ref 81.4–97.8)
PLATELET # BLD AUTO: 194 K/UL (ref 164–446)
PMV BLD AUTO: 11.2 FL (ref 9–12.9)
POTASSIUM SERPL-SCNC: 3.6 MMOL/L (ref 3.6–5.5)
PROT SERPL-MCNC: 6.1 G/DL (ref 6–8.2)
RBC # BLD AUTO: 3.86 M/UL (ref 4.2–5.4)
SCCMEC + MECA PNL NOSE NAA+PROBE: NEGATIVE
SODIUM SERPL-SCNC: 136 MMOL/L (ref 135–145)
WBC # BLD AUTO: 5.6 K/UL (ref 4.8–10.8)

## 2024-05-22 PROCEDURE — 99233 SBSQ HOSP IP/OBS HIGH 50: CPT | Performed by: INTERNAL MEDICINE

## 2024-05-22 RX ORDER — MAGNESIUM SULFATE 1 G/100ML
1 INJECTION INTRAVENOUS ONCE
Status: COMPLETED | OUTPATIENT
Start: 2024-05-22 | End: 2024-05-22

## 2024-05-22 RX ADMIN — MOMETASONE FUROATE AND FORMOTEROL FUMARATE DIHYDRATE 2 PUFF: 200; 5 AEROSOL RESPIRATORY (INHALATION) at 04:56

## 2024-05-22 RX ADMIN — METOPROLOL TARTRATE 12.5 MG: 25 TABLET, FILM COATED ORAL at 16:41

## 2024-05-22 RX ADMIN — LORAZEPAM 0.5 MG: 0.5 TABLET ORAL at 03:39

## 2024-05-22 RX ADMIN — LATANOPROST 1 DROP: 50 SOLUTION OPHTHALMIC at 16:42

## 2024-05-22 RX ADMIN — CARBOXYMETHYLCELLULOSE SODIUM 1 DROP: 5 SOLUTION/ DROPS OPHTHALMIC at 16:44

## 2024-05-22 RX ADMIN — HYDROCODONE BITARTRATE AND ACETAMINOPHEN 1 TABLET: 10; 325 TABLET ORAL at 08:30

## 2024-05-22 RX ADMIN — CEFAZOLIN 2 G: 2 INJECTION, POWDER, FOR SOLUTION INTRAMUSCULAR; INTRAVENOUS at 05:14

## 2024-05-22 RX ADMIN — MAGNESIUM SULFATE IN DEXTROSE 1 G: 10 INJECTION, SOLUTION INTRAVENOUS at 04:56

## 2024-05-22 RX ADMIN — CARBOXYMETHYLCELLULOSE SODIUM 1 DROP: 5 SOLUTION/ DROPS OPHTHALMIC at 04:56

## 2024-05-22 RX ADMIN — ALBUTEROL SULFATE 2.5 MG: 2.5 SOLUTION RESPIRATORY (INHALATION) at 11:33

## 2024-05-22 RX ADMIN — LORAZEPAM 0.5 MG: 0.5 TABLET ORAL at 15:28

## 2024-05-22 RX ADMIN — OMEPRAZOLE 20 MG: 20 CAPSULE, DELAYED RELEASE ORAL at 04:56

## 2024-05-22 RX ADMIN — METOPROLOL TARTRATE 12.5 MG: 25 TABLET, FILM COATED ORAL at 04:55

## 2024-05-22 RX ADMIN — MOMETASONE FUROATE AND FORMOTEROL FUMARATE DIHYDRATE 2 PUFF: 200; 5 AEROSOL RESPIRATORY (INHALATION) at 16:45

## 2024-05-22 RX ADMIN — CARBAMAZEPINE 200 MG: 100 TABLET, EXTENDED RELEASE ORAL at 16:41

## 2024-05-22 RX ADMIN — CLOPIDOGREL BISULFATE 75 MG: 75 TABLET ORAL at 04:56

## 2024-05-22 ASSESSMENT — PAIN DESCRIPTION - PAIN TYPE: TYPE: ACUTE PAIN

## 2024-05-22 ASSESSMENT — ENCOUNTER SYMPTOMS
WEAKNESS: 1
ABDOMINAL PAIN: 0
SHORTNESS OF BREATH: 0
FEVER: 0
PALPITATIONS: 0
DIZZINESS: 0
DOUBLE VISION: 0
HEARTBURN: 0
VOMITING: 0
COUGH: 0
NERVOUS/ANXIOUS: 0
CHILLS: 0
BLURRED VISION: 0
BACK PAIN: 0
HEADACHES: 0

## 2024-05-22 ASSESSMENT — COGNITIVE AND FUNCTIONAL STATUS - GENERAL
STANDING UP FROM CHAIR USING ARMS: A LITTLE
TOILETING: A LITTLE
SUGGESTED CMS G CODE MODIFIER DAILY ACTIVITY: CJ
SUGGESTED CMS G CODE MODIFIER MOBILITY: CK
DRESSING REGULAR LOWER BODY CLOTHING: A LITTLE
WALKING IN HOSPITAL ROOM: A LITTLE
MOVING FROM LYING ON BACK TO SITTING ON SIDE OF FLAT BED: A LITTLE
MOVING TO AND FROM BED TO CHAIR: A LITTLE
MOBILITY SCORE: 19
CLIMB 3 TO 5 STEPS WITH RAILING: A LITTLE
HELP NEEDED FOR BATHING: A LITTLE
DAILY ACTIVITIY SCORE: 21

## 2024-05-22 ASSESSMENT — LIFESTYLE VARIABLES: SUBSTANCE_ABUSE: 0

## 2024-05-22 ASSESSMENT — FIBROSIS 4 INDEX: FIB4 SCORE: 2.55

## 2024-05-22 ASSESSMENT — GAIT ASSESSMENTS
GAIT LEVEL OF ASSIST: CONTACT GUARD ASSIST
ASSISTIVE DEVICE: FRONT WHEEL WALKER
DISTANCE (FEET): 100
DEVIATION: STEP TO

## 2024-05-22 ASSESSMENT — ACTIVITIES OF DAILY LIVING (ADL): TOILETING: REQUIRES ASSIST

## 2024-05-22 NOTE — PROGRESS NOTES
Doctor Dwain alerted of patient's positive blood culture results at this time. RN was alerted by lab that patient's peripheral blood cultures from 5/20 are gram + cocci and possible staph.

## 2024-05-22 NOTE — PROGRESS NOTES
Telemetry Shift Summary     Rhythm: SR  HR: 73-85  Ectopy: n/a    Measurements: .20/.08/.36    Normal Values  Rhythm: SR  HR:   Measurements: 0.12-0.20/0.08-0.10/0.30-0.52

## 2024-05-22 NOTE — PROGRESS NOTES
"Hospital Medicine Daily Progress Note    Date of Service  5/22/2024    Chief Complaint  Navya Mcbride is a 89 y.o. female admitted 5/20/2024 with generalized weakness, shoulder pain    Hospital Course  As per chart review:  \"89 y.o. female with history of dementia, GERD, COPD/asthma and recent left total shoulder arthroplasty done by Dr. Matute on 4/25/2024.  She presented to the emergency department accompanied by her daughter on 5/20/2024 with 1 day history of worsening right shoulder pain, on her arm that she did not had surgery.  Over the course of the day, also has progressive worsening shortness of breath and occasional chest pain reason why came to the ED for further evaluation.  She denies fever.  No wheezing.\"    Interval Problem Update  5/21: Patient seen at bedside this morning.  Patient seems to be pleasantly confused, however as per the daughter the patient is at baseline pleasantly confused.  It seems the patient has a history of dementia.  The patient with shoulder pain, and generalized weakness.  CT scan also reported atelectasis.  Will encourage incentive spirometer.  PT/OT evaluation.  Continue to monitor closely.    5/22: Patient seen at bedside this morning.  Still pleasantly confused, however she seems to be more alert today.  It seems that the shoulder pain is better today.  The patient did develop diarrhea, we have ordered C. difficile testing.  The patient had a positive blood culture which at this point we suspect is contaminant, we will hold antibiotics.  We have repeated blood cultures.  PT/OT evaluation.    I have discussed this patient's plan of care and discharge plan at IDT rounds today with Case Management, Nursing, Nursing leadership, and other members of the IDT team.    Consultants/Specialty  None for now    Code Status  Full Code    Disposition  The patient is not medically cleared for discharge to home or a post-acute facility.        Review of Systems  Review of " Systems   Constitutional:  Positive for malaise/fatigue. Negative for chills and fever.   HENT:  Negative for hearing loss and nosebleeds.    Eyes:  Negative for blurred vision and double vision.   Respiratory:  Negative for cough and shortness of breath.    Cardiovascular:  Negative for chest pain and palpitations.   Gastrointestinal:  Negative for abdominal pain, heartburn and vomiting.   Genitourinary:  Negative for dysuria and urgency.   Musculoskeletal:  Positive for joint pain. Negative for back pain.   Skin:  Negative for itching and rash.   Neurological:  Positive for weakness (generalized). Negative for dizziness and headaches.   Psychiatric/Behavioral:  Negative for substance abuse. The patient is not nervous/anxious.    All other systems reviewed and are negative.       Physical Exam  Temp:  [36.3 °C (97.4 °F)-37.2 °C (98.9 °F)] 37.2 °C (98.9 °F)  Pulse:  [] 74  Resp:  [17-18] 17  BP: ()/(55-83) 164/83  SpO2:  [93 %-95 %] 94 %    Physical Exam  Vitals and nursing note reviewed.   Constitutional:       General: She is not in acute distress.     Appearance: She is ill-appearing.   HENT:      Head: Normocephalic and atraumatic.      Right Ear: External ear normal.      Left Ear: External ear normal.      Mouth/Throat:      Pharynx: No oropharyngeal exudate or posterior oropharyngeal erythema.   Eyes:      General:         Right eye: No discharge.         Left eye: No discharge.   Cardiovascular:      Rate and Rhythm: Normal rate and regular rhythm.      Pulses: Normal pulses.      Heart sounds: No murmur heard.     No gallop.   Pulmonary:      Effort: Pulmonary effort is normal. No respiratory distress.      Breath sounds: Normal breath sounds. No wheezing or rhonchi.   Abdominal:      General: Bowel sounds are normal. There is no distension.      Palpations: Abdomen is soft.      Tenderness: There is no abdominal tenderness. There is no guarding.   Musculoskeletal:         General: No swelling  or tenderness. Normal range of motion.      Cervical back: Normal range of motion and neck supple. No tenderness.   Skin:     General: Skin is warm and dry.   Neurological:      General: No focal deficit present.      Mental Status: She is alert. Mental status is at baseline. She is disoriented.      Motor: No weakness.      Comments: Patient seems to be confused at baseline         Fluids  No intake or output data in the 24 hours ending 05/22/24 1151    Laboratory  Recent Labs     05/20/24 1942 05/21/24 0321 05/22/24  0058   WBC 4.3* 4.0* 5.6   RBC 4.27 3.65* 3.86*   HEMOGLOBIN 12.7 10.9* 11.5*   HEMATOCRIT 39.0 33.7* 36.1*   MCV 91.3 92.3 93.5   MCH 29.7 29.9 29.8   MCHC 32.6 32.3 31.9*   RDW 47.8 48.0 48.0   PLATELETCT 241 198 194   MPV 10.4 10.5 11.2     Recent Labs     05/20/24 1942 05/21/24 0321 05/22/24  0058   SODIUM 138 138 136   POTASSIUM 3.9 3.6 3.6   CHLORIDE 104 108 102   CO2 23 22 23   GLUCOSE 118* 99 106*   BUN 20 19 21   CREATININE 0.66 0.56 0.71   CALCIUM 9.4 7.8* 8.8                   Imaging  CT-CTA CHEST PULMONARY ARTERY W/ RECONS   Final Result         1.  No large central pulmonary embolus is appreciated, evaluation of the subsegmental branches is essentially nondiagnostic due to motion artifacts. Additional imaging would be required for definitive exclusion of small distal pulmonary emboli.   2.  Multiple low-density hepatic lesions, appearance suggesting hepatic cysts, otherwise indeterminate.   3.  Pulmonary nodule, see nodule follow-up recommendations below.      Fleischner Society pulmonary nodule recommendations:      Low Risk: No routine follow-up      High Risk: Optional CT at 12 months      Comments: Nodules less than 6 mm do not require routine follow-up, but certain patients at high risk with suspicious nodule morphology, upper lobe location, or both may warrant 12-month follow-up.      Low Risk - Minimal or absent history of smoking and of other known risk factors.      High  Risk - History of smoking or of other known risk factors.      Note: These recommendations do not apply to lung cancer screening, patients with immunosuppression, or patients with known primary cancer.      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      DX-SHOULDER 2+ RIGHT   Final Result      1. 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   2. No acute fracture.      DX-CHEST-PORTABLE (1 VIEW)   Final Result      1. No acute findings.   2. Postsurgical changes of reverse left total shoulder arthroplasty.   3. Progressive marked degenerative changes in the right glenohumeral joint.           Assessment/Plan  * Hypoxia- (present on admission)  Assessment & Plan  -Inpatient status to telemetry.  -Patient has URI symptoms and currently requiring 3 L of oxygen via nasal cannula.  She was tachycardic and tachypneic in the emergency department.  Chest x-ray was negative for acute abnormalities.  Recent left shoulder surgery history on 4/25.  She had an elevated D-dimer therefore I added a CTA of her chest.  -There is no large or central PE she was found to have a small pulmonary nodule 5.1 mm on the left upper lobe.  -It is possible that she is requiring oxygen after receiving pain medication for right shoulder pain.  -I added procalcitonin which is also negative.  Her viral panel is negative and I will hold off ordering antibiotics for now and closely monitor.  -Patient has history of COPD but currently not wheezing.  Will have RT also working with her.    5/21: CT scan also reported atelectasis which could be the cause due to recent surgery.  We will encourage incentive spirometer.    5/22: Oxygen demand is improving, we will  continue with incentive spirometer.    Diarrhea  Assessment & Plan  Patient developed diarrhea.  We have ordered C. difficile.  Monitor closely.    Positive blood culture  Assessment & Plan  At this point we suspect this is most likely a contaminant after discussing with ID pharmacy, antibiotics have been stopped.  We have repeated blood cultures.    Dementia (HCC)  Assessment & Plan  As per the daughter patient with a history of dementia.  The patient is pleasantly confused, however at baseline the patient is also pleasantly confused.  Continue to monitor closely.    Anemia  Assessment & Plan  No signs of overt bleeding  monitor    Right shoulder pain  Assessment & Plan  -Patient reports having right shoulder pain.  Recent left total shoulder arthroplasty surgery by Dr. Matute on 4/25/2024.  -Patient had shoulder x-ray showing marked osteoarthritic changes involving right glomerular Que and right acromioclavicular joints but no acute fracture  -Patient reports significant improvement of her pain after receiving fentanyl.  -I will hold off if possible giving IV narcotic medication due to hypoxia but she might need some pain control for underlying osteoarthritis.  -Will have to closely monitor respiratory status after she received fentanyl.    5/21: PT/OT evaluation    Glaucoma- (present on admission)  Assessment & Plan  -Order latanoprost.    GERD (gastroesophageal reflux disease)- (present on admission)  Assessment & Plan  -Continue omeprazole.    History of pulmonary embolism - 2009 after knee surgery- (present on admission)  Assessment & Plan  -She was previously anticoagulated but currently taking Plavix.    Memory loss- (present on admission)  Assessment & Plan  -Patient takes Xanax, Seroquel, duloxetine and carbamazepine for behavior control.  She is pleasant and talkative in the emergency department.  All her medications were ordered.         VTE prophylaxis: SCDs    I have performed a physical exam and  reviewed and updated ROS and Plan today (5/22/2024). In review of yesterday's note (5/21/2024), there are no changes except as documented above.      I spend at least 52 minutes providing care for this patient.  This includes face-to-face interview, physical examination.  Review of lab work including CBC, CMP, magnesium.  Discussion with daughter regarding plan of care. Discussion with respiratory therapy. Discussion with multidisciplinary team including case management, nursing staff and pharmacy.  Creating plan of care, reviewing orders.

## 2024-05-22 NOTE — CARE PLAN
The patient is Stable - Low risk of patient condition declining or worsening    Shift Goals  Clinical Goals: monitor respiratory status  Patient Goals: FINA  Family Goals: update POC    Progress made toward(s) clinical / shift goals:    Problem: Psychosocial  Goal: Patient's level of anxiety will decrease  Outcome: Progressing     Problem: Pain - Standard  Goal: Alleviation of pain or a reduction in pain to the patient’s comfort goal  Outcome: Progressing       Patient is not progressing towards the following goals:

## 2024-05-22 NOTE — DISCHARGE PLANNING
Received Choice Form at: 1137  Agency/Facility Name: [facility]  Sent Referral per Choice Form at: Only scanned choice to media as O2 orders not in at time of media scan.    MERCEDES RN notified

## 2024-05-22 NOTE — ASSESSMENT & PLAN NOTE
At this point we suspect this is most likely a contaminant after discussing with ID pharmacy, antibiotics have been stopped.  We have repeated blood cultures.

## 2024-05-22 NOTE — DISCHARGE PLANNING
Agency/Facility Name: [facility]  Outcome: Scanned to media only as HH orders not in chart.    CM RN notified    Referral request submitted at 1350 per CM RN as orders have been placed

## 2024-05-22 NOTE — CARE PLAN
The patient is Watcher - Medium risk of patient condition declining or worsening    Shift Goals  Clinical Goals: Monitor oxygen saturations  Patient Goals: FINA  Family Goals: Updates on POC    Progress made toward(s) clinical / shift goals:    Problem: Communication  Goal: The ability to communicate needs accurately and effectively will improve  Outcome: Progressing   used to communicate with patient.     Problem: Respiratory  Goal: Patient will achieve/maintain optimum respiratory ventilation and gas exchange  Outcome: Progressing  Patient on 3L NC, continuous pulse ox monitoring in use.     Problem: Mobility  Goal: Patient's capacity to carry out activities will improve  Outcome: Progressing  Patient ambulates with x1 assist, bed alarm on.

## 2024-05-22 NOTE — PROGRESS NOTES
Received report from Susi HENDRICKSON.     Assessment completed. Bed is locked and in lowest position. Fall and Safety precautions in place. Reviewed POC. Call light within reach. Patient's daughter at bedside. Home evaluation completed, MD notified.  No other needs at this time.     0930: Patient had second BM with loose stools, urgency, and incontinence for this morning. MD notified, new orders for Special Contact Isolation Precautions.

## 2024-05-22 NOTE — FLOWSHEET NOTE
05/22/24 1133   Events/Summary/Plan   Events/Summary/Plan SVN tx given pt on 1Lpm, MDI given, pt using IS reaches 1000 w/ coaching   Vital Signs   Pulse 74   Respiration 17   Pulse Oximetry 94 %   $ Pulse Oximetry (Spot Check) Yes   Respiratory Assessment   Respiratory Pattern Within Normal Limits   Level of Consciousness Alert   Chest Exam   Work Of Breathing / Effort Within Normal Limits   Breath Sounds   RUL Breath Sounds Clear   RML Breath Sounds Clear;Diminished   RLL Breath Sounds Clear;Diminished   TAMMIE Breath Sounds Clear   LLL Breath Sounds Clear;Diminished   Secretions   Cough Non Productive   Oxygen   O2 (LPM) 1   O2 Delivery Device Silicone Nasal Cannula

## 2024-05-22 NOTE — DISCHARGE PLANNING
Case Management Discharge Planning    Admission Date: 5/20/2024  GMLOS: 2.5  ALOS: 2    6-Clicks ADL Score: 20  6-Clicks Mobility Score: 19      Anticipated Discharge Dispo: Discharge Disposition: Discharged to home/self care (01)    DME Needed: Yes    DME Ordered: No    Action(s) Taken: Updated Provider/Nurse on Discharge Plan, DC Assessment Complete (See below), Choice obtained, and OTHER    Discussed during morning rounds. Per MD, pending home O2 eval and PT/OT eval.     Per flowsheet, pt is confused. RN CM spoke to pt's daughter at bedside. Pt uses home oxygen at night only but Malinda forgot the name of DME company but will find out and let RN MERCEDES know. Pt's daughter also reports pt was set up with HH previously but pt declined at that time but they are open to HH this time, if needed. Received choice for [facility]. Choice form faxed to [facility].    Per chart review, pt has 3 LPM from [facility]. RN CM called [facility], spoke to Caridad. She confirmed pt is on service with [facility] for home oxygen, most recent order is O2, 2 LPM, 24/7 continuous. Received DME choice. Choice form faxed to [facility].    Pending DME O2 order and PT/OT evals at this time.       Escalations Completed: None    Medically Clear: No    Next Steps:   Follow up with treatment team for medical clearance and discharge planning needs.    Barriers to Discharge: Medical clearance, Pending PT Evaluation, DME, and Oxygen Delivery    Is the patient up for discharge tomorrow: No          Care Transition Team Assessment    RN CM spoke to pt's daughter at bedside. Pt's discharge support are her daughters. Sometimes pt stays with her daughter in [place], and sometimes in [place]. Pt stays on the first floor. Per pt's daughter, Malinda, pt will be staying with her after discharge. Pt uses a walker at home. Pt uses home oxygen at night only but Malinda forgot the name of DME company but will find out and let RN MERCEDES know. Pt's daughters provide assistance with ADLs and drives pt to  appointments. Pt has a PCP. Pt has Medicare and Medicaid FFS. Per pt's daughter, either her or her sister will provide transportation at discharge.          Information Source  Orientation Level: Oriented to person, Oriented to place, Disoriented to time, Oriented to situation  Information Given By: Relative (daughter)  Informant's Name: NAA PAGAN       Elopement Risk  Legal Hold: No  Ambulatory or Self Mobile in Wheelchair: Yes  Disoriented: Place-At Risk for Elopement, Situation-At Risk for Elopement  Elopement Risk: Not at Risk for Elopement    Interdisciplinary Discharge Planning  Primary Care Physician: DIOGO DE LA CRUZ M.D. ([facility])  Lives with - Patient's Self Care Capacity: Related Adult  Patient or legal guardian wants to designate a caregiver: Yes  Caregiver name: Cherellekulwant Madrid, Riddhi Thompson, Love Shalini - daughters  Support Systems: Children  Housing / Facility: [address] [place] [address]  Durable Medical Equipment: Home Oxygen, Walker (oxygen at night only)    Discharge Preparedness  What is your plan after discharge?: Home health care, Home with help, Uncertain - pending medical team collaboration  What are your discharge supports?: Child (daughters)  Prior Functional Level: Needs Assist with Activities of Daily Living, Needs Assist with Medication Management, Uses Walker    Functional Assesment  Prior Functional Level: Needs Assist with Activities of Daily Living, Needs Assist with Medication Management, Uses Walker    Finances  Financial Barriers to Discharge: No  Prescription Coverage: Yes       Domestic Abuse  Possible Abuse/Neglect Reported to:: Not Applicable       Discharge Risks or Barriers  Discharge risks or barriers?: Complex medical needs  Patient risk factors: Complex medical needs    Anticipated Discharge Information  Discharge Disposition: Discharged to home/self care (01)

## 2024-05-22 NOTE — THERAPY
Physical Therapy   Initial Evaluation     Patient Name: Navya Mcbride  Age:  89 y.o., Sex:  female  Medical Record #: [id number]  Today's Date: 5/22/2024     Precautions  Precautions: (P) Fall Risk  Comments: (P) mod    Assessment  Patient is 89 y.o. female with a diagnosis of Hypoxia S/P L TSR 1 month ago.Pt admitted after fall on R UE.She lives at home with daughter and is mod active.02 sat on 1 lit with rest 94%. With amb on room air 86%     Plan    Physical Therapy Initial Treatment Plan   Treatment Plan : (P) Bed Mobility, Gait Training, Therapeutic Activities, Therapeutic Exercise, Neuro Re-Education / Balance  Treatment Frequency: (P) 4 Times per Week    DC Equipment Recommendations: (P) None  Discharge Recommendations: (P) Recommend outpatient physical therapy services to address higher level deficits        Objective       05/22/24 1000   Charge Group   PT Evaluation PT Evaluation Mod   Total Time Spent   PT Evaluation Time Spent (Mins) 30   Initial Contact Note    Initial Contact Note Order Received and Verified, Physical Therapy Evaluation in Progress with Full Report to Follow.   Precautions   Precautions Fall Risk   Comments mod   Prior Living Situation   Prior Services Intermittent Physical Support for ADL Per Family   Housing / Facility 1 Story House   Steps Into Home 0   Steps In Home 0   Equipment Owned Front-Wheel Walker   Lives with - Patient's Self Care Capacity Adult Children   Prior Level of Functional Mobility   Bed Mobility Required Assist   Transfer Status Required Assist   Ambulation Required Assist   Ambulation Distance household   Assistive Devices Used Front-Wheel Walker   History of Falls   History of Falls Yes   Cognition    Cognition / Consciousness WDL   Passive ROM Upper Body   Passive ROM Upper Body X   Active ROM Upper Body   Active ROM Upper Body  X   Passive ROM Lower Body   Passive ROM Lower Body WDL   Active ROM Lower Body    Active ROM Lower Body  WDL   Strength Lower  Body   Lower Body Strength  X   Coordination Lower Body    Coordination Lower Body  WDL   Bed Mobility    Supine to Sit Minimal Assist   Sit to Supine Minimal Assist   Scooting Moderate Assist   Gait Analysis   Gait Level Of Assist Contact Guard Assist   Assistive Device Front Wheel Walker   Distance (Feet) 100   # of Times Distance was Traveled 1   Deviation Step To   # of Stairs Climbed 0   Weight Bearing Status wbat   Functional Mobility   Sit to Stand Minimal Assist   Bed, Chair, Wheelchair Transfer Minimal Assist   Transfer Method Stand Step   6 Clicks Assessment - How much HELP from from another person do you currently need... (If the patient hasn't done an activity recently, how much help from another person do you think he/she would need if he/she tried?)   Turning from your back to your side while in a flat bed without using bedrails? 4   Moving from lying on your back to sitting on the side of a flat bed without using bedrails? 3   Moving to and from a bed to a chair (including a wheelchair)? 3   Standing up from a chair using your arms (e.g., wheelchair, or bedside chair)? 3   Walking in hospital room? 3   Climbing 3-5 steps with a railing? 3   6 clicks Mobility Score 19   Activity Tolerance   Sitting Edge of Bed 10   Standing 10   Patient / Family Goals    Patient / Family Goal #1 home   Short Term Goals    Short Term Goal # 1 S with bed mob in 4 V   Short Term Goal # 2 S with transfers in 4 V   Short Term Goal # 3 CGA amb x 200 feet in 4 v   Physical Therapy Initial Treatment Plan    Treatment Plan  Bed Mobility;Gait Training;Therapeutic Activities;Therapeutic Exercise;Neuro Re-Education / Balance   Treatment Frequency 4 Times per Week   Problem List    Problems Impaired Bed Mobility;Impaired Transfers;Impaired Ambulation;Functional ROM Deficit;Functional Strength Deficit;Impaired Balance;Decreased Activity Tolerance   Anticipated Discharge Equipment and Recommendations   DC Equipment Recommendations  None   Discharge Recommendations Recommend outpatient physical therapy services to address higher level deficits   Interdisciplinary Plan of Care Collaboration   IDT Collaboration with  Nursing   Session Information   Date / Session Number  5/22-1 1/4 5/28

## 2024-05-23 VITALS
DIASTOLIC BLOOD PRESSURE: 82 MMHG | RESPIRATION RATE: 18 BRPM | TEMPERATURE: 98.1 F | WEIGHT: 138.89 LBS | OXYGEN SATURATION: 93 % | BODY MASS INDEX: 25.56 KG/M2 | HEIGHT: 62 IN | HEART RATE: 80 BPM | SYSTOLIC BLOOD PRESSURE: 150 MMHG

## 2024-05-23 PROBLEM — R91.1 PULMONARY NODULE: Status: ACTIVE | Noted: 2024-05-23

## 2024-05-23 PROBLEM — I10 HTN (HYPERTENSION): Status: ACTIVE | Noted: 2024-05-23

## 2024-05-23 LAB
ANION GAP SERPL CALC-SCNC: 11 MMOL/L (ref 7–16)
BACTERIA BLD CULT: ABNORMAL
BACTERIA BLD CULT: ABNORMAL
BUN SERPL-MCNC: 14 MG/DL (ref 8–22)
CALCIUM SERPL-MCNC: 8.9 MG/DL (ref 8.4–10.2)
CHLORIDE SERPL-SCNC: 107 MMOL/L (ref 96–112)
CO2 SERPL-SCNC: 24 MMOL/L (ref 20–33)
CREAT SERPL-MCNC: 0.61 MG/DL (ref 0.5–1.4)
ERYTHROCYTE [DISTWIDTH] IN BLOOD BY AUTOMATED COUNT: 46.9 FL (ref 35.9–50)
GFR SERPLBLD CREATININE-BSD FMLA CKD-EPI: 86 ML/MIN/1.73 M 2
GLUCOSE SERPL-MCNC: 94 MG/DL (ref 65–99)
HCT VFR BLD AUTO: 37.4 % (ref 37–47)
HGB BLD-MCNC: 12.1 G/DL (ref 12–16)
MAGNESIUM SERPL-MCNC: 2.1 MG/DL (ref 1.5–2.5)
MCH RBC QN AUTO: 29.7 PG (ref 27–33)
MCHC RBC AUTO-ENTMCNC: 32.4 G/DL (ref 32.2–35.5)
MCV RBC AUTO: 91.9 FL (ref 81.4–97.8)
PLATELET # BLD AUTO: 206 K/UL (ref 164–446)
PMV BLD AUTO: 10.3 FL (ref 9–12.9)
POTASSIUM SERPL-SCNC: 3.5 MMOL/L (ref 3.6–5.5)
RBC # BLD AUTO: 4.07 M/UL (ref 4.2–5.4)
SIGNIFICANT IND 70042: ABNORMAL
SITE SITE: ABNORMAL
SODIUM SERPL-SCNC: 142 MMOL/L (ref 135–145)
SOURCE SOURCE: ABNORMAL
WBC # BLD AUTO: 3.4 K/UL (ref 4.8–10.8)

## 2024-05-23 PROCEDURE — 99239 HOSP IP/OBS DSCHRG MGMT >30: CPT | Performed by: INTERNAL MEDICINE

## 2024-05-23 RX ORDER — HALOPERIDOL 5 MG/ML
1 INJECTION INTRAMUSCULAR
Status: DISCONTINUED | OUTPATIENT
Start: 2024-05-23 | End: 2024-05-23 | Stop reason: HOSPADM

## 2024-05-23 RX ADMIN — METOPROLOL TARTRATE 12.5 MG: 25 TABLET, FILM COATED ORAL at 08:29

## 2024-05-23 RX ADMIN — HYDROCODONE BITARTRATE AND ACETAMINOPHEN 1 TABLET: 10; 325 TABLET ORAL at 16:41

## 2024-05-23 RX ADMIN — CLOPIDOGREL BISULFATE 75 MG: 75 TABLET ORAL at 08:28

## 2024-05-23 RX ADMIN — HALOPERIDOL LACTATE 1 MG: 5 INJECTION, SOLUTION INTRAMUSCULAR at 05:55

## 2024-05-23 RX ADMIN — OMEPRAZOLE 20 MG: 20 CAPSULE, DELAYED RELEASE ORAL at 08:28

## 2024-05-23 RX ADMIN — ACETAMINOPHEN 650 MG: 325 TABLET ORAL at 12:27

## 2024-05-23 RX ADMIN — CARBOXYMETHYLCELLULOSE SODIUM 1 DROP: 5 SOLUTION/ DROPS OPHTHALMIC at 08:31

## 2024-05-23 RX ADMIN — MOMETASONE FUROATE AND FORMOTEROL FUMARATE DIHYDRATE 2 PUFF: 200; 5 AEROSOL RESPIRATORY (INHALATION) at 07:14

## 2024-05-23 RX ADMIN — HYDROCODONE BITARTRATE AND ACETAMINOPHEN 1 TABLET: 10; 325 TABLET ORAL at 09:36

## 2024-05-23 ASSESSMENT — PAIN DESCRIPTION - PAIN TYPE: TYPE: ACUTE PAIN

## 2024-05-23 NOTE — PROGRESS NOTES
Patient is alert and oriented to self and confused at baseline. This evening she is sun downing significantly as soon as her family left bedside. RN was unable to direct patient back to bed, she was not wanting to take any sort of medications. Patient refused her scheduled Seroquel and prn xanax, tearful at this time stating that she wants to go home. Patient is very unstable on her feet, but not wanting to sit down, any time RN and CNA direct patient and attempt to get her to go back to bed she refuses. Safety sitter placed at bedside at this time.    0500: patient slept through the night and did well with sitter at bedside, helped with her anxiety of being alone at night in the hospital. She still continues to refuse medications at this time until her daughter is present at bed side. Patient wanting to go home.

## 2024-05-23 NOTE — PROGRESS NOTES
Telemetry Shift Summary    Rhythm SR  HR Range 80-92  Ectopy : rPVC, rCoup  Measurements 0.17/0.07/0.34    Normal Values  Rhythm SR  HR Range:   Measurements: 0.12-0.20/0.06-0.10/0.30-0.52

## 2024-05-23 NOTE — THERAPY
"Occupational Therapy   Initial Evaluation     Patient Name: Navya Mcbride  Age:  89 y.o., Sex:  female  Medical Record #: [id number]  Today's Date: 5/22/2024     Precautions: (P) Fall Risk  Comments: mod    Assessment  Patient is 89 y.o. female admitted with weakness, R shoulder pain, hypoxia. Recent fall onto RUE. L TSA 4/25/24 with Dr Mattue. Pt shows ROM WFL BUE; c/o no pain at this time. Hx of dementia, sundowning, GERD, COPD, home O2. Pt presents A&Ox1-2; impulsive, confused. Follows 1-step commands. Daughter in room and translates to French for pt. Pt shows impulsiveness, poor safety, impaired cognition; max cues and time for each task. Pt without FWW at home usually and \"walks all over there place\" per dtr; with FWW in room pt unable to use safely. LB dressing and grooming and toileting with SBA/CGA, vc's. See below for CLOF. Pt may be calmer once medically cleared and at home environment. Recommend continued 24/7 Spv ( goes b/t 2 daughters' homes). OT will follow while in house.           Plan  Occupational Therapy Initial Treatment Plan   Treatment Interventions: Self Care / Activities of Daily Living, Neuro Re-Education / Balance, Therapeutic Activity  Treatment Frequency: 3 Times per Week  Duration: Until Therapy Goals Met    DC Equipment Recommendations: Unable to determine at this time  Discharge Recommendations: Recommend home health for continued occupational therapy services     Subjective  Anxious. Daughter reports pt takes anti-anxiety meds at home.      Objective   05/22/24 0785   Prior Living Situation   Prior Services Continuous (24 Hour) Care Giving Family   Housing / Facility 1 Story House   Bathroom Set up Walk In Shower;Shower Chair;Grab Bars   Equipment Owned Front-Wheel Walker;Tub / Shower Seat;Grab Bar(s) In Tub / Shower   Lives with - Patient's Self Care Capacity Adult Children   Comments hx of dementia. s/p L TSA 4/25/24. Lives with daughters (goes b/t 2 houses for 24/7 Spv) "   Prior Level of ADL Function   Self Feeding Independent   Grooming / Hygiene Independent   Bathing Requires Assist   Dressing Requires Assist   Toileting Requires Assist   Prior Level of IADL Function   Medication Management Requires Assist   Laundry Requires Assist   Kitchen Mobility Independent   Finances Requires Assist   Home Management Requires Assist   Shopping Requires Assist   Prior Level Of Mobility Independent Without Device in Home   Driving / Transportation Relatives / Others Provide Transportation   Occupation (Pre-Hospital Vocational) Not Employed   Leisure Interests Unable To Determine At This Time   History of Falls   History of Falls Yes   Precautions   Precautions Fall Risk   Vitals   O2 (LPM) 1   O2 Delivery Device Silicone Nasal Cannula   Pain 0 - 10 Group   Therapist Pain Assessment 0   Cognition    Cognition / Consciousness X   Level of Consciousness Alert   Comments Ox1-2, confusion- dtr reports baseline. anxious. dtr translates to pt in French.   Passive ROM Upper Body   Passive ROM Upper Body WDL   Active ROM Upper Body   Active ROM Upper Body  WDL   Comments S/p TSA, 4/25/24. Pt is not in a brace; dtr states it is at home.   Strength Upper Body   Upper Body Strength  Not Tested   Sensation Upper Body   Upper Extremity Sensation  Not Tested   Upper Body Muscle Tone   Upper Body Muscle Tone  WDL   Coordination Upper Body   Coordination WDL   Balance Assessment   Sitting Balance (Static) Good   Sitting Balance (Dynamic) Fair +   Standing Balance (Static) Fair   Standing Balance (Dynamic) Fair -   Weight Shift Sitting Good   Weight Shift Standing Fair   ADL Assessment   Eating Independent   Grooming Standby Assist;Standing   Lower Body Dressing Standby Assist   Toileting Contact Guard Assist   Comments cues for ADL's due to dementia.   How much help from another person does the patient currently need...   Putting on and taking off regular lower body clothing? 3   Bathing (including washing,  rinsing, and drying)? 3   Toileting, which includes using a toilet, bedpan, or urinal? 3   Putting on and taking off regular upper body clothing? 4   Taking care of personal grooming such as brushing teeth? 4   Eating meals? 4   6 Clicks Daily Activity Score 21   Functional Mobility   Sit to Stand Contact Guard Assist   Bed, Chair, Wheelchair Transfer Minimal Assist   Toilet Transfers Minimal Assist   Transfer Method Stand Step   Comments fww- max cues.   Activity Tolerance   Sitting in Chair >1 hr   Sitting Edge of Bed 6   Standing 6   Education Group   Education Provided Home Safety;Activities of Daily Living   Role of Occupational Therapist Patient Response Patient;Family   Home Safety Patient Response Patient;Family   ADL Patient Response Patient;Family   Occupational Therapy Initial Treatment Plan    Treatment Interventions Self Care / Activities of Daily Living;Neuro Re-Education / Balance;Therapeutic Activity   Treatment Frequency 3 Times per Week   Duration Until Therapy Goals Met   Problem List   Problem List Safety Awareness Deficits / Cognition;Impaired Cognitive Function;Impaired Postural Control / Balance;Decreased Active Daily Living Skills;Decreased Activity Tolerance;Decreased Functional Mobility   Anticipated Discharge Equipment and Recommendations   DC Equipment Recommendations Unable to determine at this time   Discharge Recommendations Recommend home health for continued occupational therapy services   Interdisciplinary Plan of Care Collaboration   IDT Collaboration with  Nursing;Family / Caregiver   Patient Position at End of Therapy Seated;Family / Friend in Room;Call Light within Reach;Tray Table within Reach   Collaboration Comments OT Jennal. DC planning. Pt appears to be near baseline. anxious; dtr reports pt takes anti-anxiety meds at home. d/w nrsg.

## 2024-05-23 NOTE — PROGRESS NOTES
Received report from Susi HENDRICKSON.     Assessment completed. Bed is locked and in lowest position. Fall and Safety precautions in place. Reviewed POC. Call light within reach. No other needs at this time.

## 2024-05-23 NOTE — PROGRESS NOTES
Patient became progressively agitated, wanting to leave, attempting to go into other patient's rooms. Refusing to take any oral prn medications that are already ordered. Not directable at this time. MD Kimble aware, PRN IV Haldol ordered and administered. Security called to held assist patient back to her room at this time.     0615: patient resting comfortably and calmly in her chair at this time with sitter at bedside

## 2024-05-23 NOTE — DISCHARGE SUMMARY
"Discharge Summary    CHIEF COMPLAINT ON ADMISSION  Chief Complaint   Patient presents with    Chest Pain     L    Shortness of Breath     And cough    Shoulder Pain     L. Pt. Family reports they are unsure if she has recently fallen. Reports recent sx to L shoulder last month.        Reason for Admission  Shoulder Pain     Admission Date  5/20/2024    CODE STATUS  Full Code    HPI & HOSPITAL COURSE  As per chart review:  \"89 y.o. female with history of dementia, GERD, COPD/asthma and recent left total shoulder arthroplasty done by Dr. Matute on 4/25/2024.  She presented to the emergency department accompanied by her daughter on 5/20/2024 with 1 day history of worsening right shoulder pain, on her arm that she did not had surgery.  Over the course of the day, also has progressive worsening shortness of breath and occasional chest pain reason why came to the ED for further evaluation.  She denies fever.  No wheezing.\"    Patient was admitted for further management and care.  The patient had a CTA which did not report pulmonary embolism, eventually the patient's oxygen demand improved.  Today on the day of discharge the patient only requiring 1 L of oxygen and she was requiring 3 L upon admission.  The CT scan also reported atelectasis which could have been due to recent surgery.  Incentive spirometer was encouraged and it seems the patient has been improving.  The patient also has a history of COPD, however it does not seem that the patient was in COPD exacerbation.  We have placed referral to pulmonary as an outpatient as well.    The patient's right shoulder pain seems to have improved while hospitalized, however it seems the patient already has follow-up appointment with orthopedic surgery as an outpatient.  Home health has been ordered for the patient.    The patient seen at bedside this morning.  The patient is at baseline pleasantly confused.  She has improved pain wise as well as oxygen demand.  The patient " will be discharged home.  The patient require close follow-up with PCP, orthopedic surgery and pulmonary as an outpatient.    It seems the patient had to episodes of diarrhea, we did ordered C. difficile, however eventually the patient's diarrhea resolved.  The patient's daughter was advised to bring back the patient if she continues at home with diarrhea or clinically deteriorates.  She understands.    Of note it seems the patient did have 1 out of 2 blood cultures positive.  We repeated blood cultures which have been negative to date.  At this point we suspect this is most likely contaminant.    Therefore, she is discharged in fair and stable condition to home with organized home healthcare and close outpatient follow-up.    The patient met 2-midnight criteria for an inpatient stay at the time of discharge.    Discharge Date  05/23/2024    FOLLOW UP ITEMS POST DISCHARGE  Patient will require close follow-up with PCP, orthopedic surgery and pulmonary as an outpatient.    DISCHARGE DIAGNOSES  Principal Problem:    Hypoxia (POA: Yes)  Active Problems:    Positive blood culture (POA: Unknown)    Diarrhea (POA: Unknown)    Memory loss (POA: Yes)    History of pulmonary embolism - 2009 after knee surgery (Chronic) (POA: Yes)    GERD (gastroesophageal reflux disease) (POA: Yes)    Glaucoma (POA: Yes)    COPD (chronic obstructive pulmonary disease) (HCC) (POA: Yes)    Right shoulder pain (POA: Unknown)    Anemia (POA: Unknown)    Dementia (HCC) (POA: Unknown)    Generalized weakness (POA: Unknown)  Resolved Problems:    * No resolved hospital problems. *      FOLLOW UP  Future Appointments   Date Time Provider Department Center   5/28/2024 11:15 AM José Luis Matute M.D. [facility]   6/7/2024 10:45 AM [facility]   6/7/2024 11:15 AM José Luis Matute M.D. [facility]     [facility] - [address]  [facility] [address]  [phone]        Thom Rodriguez M.D.  [address]  [facility] [address]  [phone]    Schedule an appointment as soon as possible for a visit      Pulmonary    Schedule an appointment as soon as possible for a visit      José Luis Matute M.D.  [address] [place] [address]  [phone]    Schedule an appointment as soon as possible for a visit        MEDICATIONS ON DISCHARGE     Medication List        ASK your doctor about these medications        Instructions   albuterol 108 (90 Base) MCG/ACT Aers inhalation aerosol   Inhale 2 Puffs by mouth every 6 hours as needed for Shortness of Breath.  Dose: 2 Puff     ALPRAZolam 0.5 MG Tabs  Commonly known as: Xanax   Take 0.5 mg by mouth at bedtime as needed for Sleep.  Dose: 0.5 mg     carbamazepine 200 MG CR capsule  Commonly known as: Carbatrol   Take 200 mg by mouth every evening.  Dose: 200 mg     celecoxib 200 MG Caps  Commonly known as: CeleBREX   Take 200 mg by mouth 2 times a day.  Dose: 200 mg     clopidogrel 75 MG Tabs  Commonly known as: Plavix   Take 75 mg by mouth every day.  Dose: 75 mg     Coenzyme Q10 300 MG Caps   Take 1 Capsule by mouth every day.  Dose: 1 Capsule     Combivent Respimat  MCG/ACT Aers  Generic drug: ipratropium-albuterol   Inhale 1 Puff 1 time a day as needed (takes as needed SOB).  Dose: 1 Puff     Compressor/Nebulizer Misc  Ask about: Should I take this medication?   Take 1 Each by nebulization one time for 1 dose.  Dose: 1 Each     CYANOCOBALAMIN INJ   Inject 3 mL into the shoulder, thigh, or buttocks every 30 days. From [facility] given by daughter  Dose: 3 mL     DULoxetine 60 MG Cpep delayed-release capsule  Commonly known as: Cymbalta   Take 60 mg by mouth every day. AM  Dose: 60 mg     HYDROcodone-acetaminophen 7.5-325 MG tab  Commonly known as: Norco   Take 1 Tablet by mouth every 6 hours as needed for Moderate Pain.  Dose: 1 Tablet     latanoprost 0.005 % Soln  Commonly known as: Xalatan   Administer 1 Drop into both eyes every evening.  Dose: 1 Drop      LORazepam 0.5 MG Tabs  Commonly known as: Ativan   Take 0.5 mg by mouth 2 times a day as needed for Anxiety.  Dose: 0.5 mg     metoprolol SR 25 MG Tb24  Commonly known as: Toprol XL   Take 25 mg by mouth 2 times a day.  Dose: 25 mg     multivitamin Tabs   Take 1 Tablet by mouth every day.  Dose: 1 Tablet     nitroglycerin 0.4 MG Subl  Commonly known as: Nitrostat   Place 1 Tablet under the tongue as needed for Chest Pain.  Dose: 0.4 mg     pantoprazole 40 MG Tbec  Commonly known as: Protonix   Take 40 mg by mouth every day.  Dose: 40 mg     Restasis 0.05 % ophthalmic emulsion  Generic drug: cycloSPORINE   Place 1 Drop in both eyes 2 times a day.  Dose: 1 Drop     rosuvastatin 20 MG Tabs  Commonly known as: Crestor   Take 1 Tablet by mouth every evening.  Dose: 20 mg     SEROquel 50 MG tablet  Generic drug: QUEtiapine   Take 50 mg by mouth at bedtime.  Dose: 50 mg     Symbicort 80-4.5 MCG/ACT Aero  Generic drug: budesonide-formoterol   Inhale 2 Puffs by mouth 2 Times a Day.  Dose: 2 Puff              Allergies  Allergies   Allergen Reactions    Contrast Media With Iodine [Iodine] Shortness of Breath and Itching     Dyspnea, throat closing- can use benadryl to minimize rxn if necessary     Tequin [Gatifloxacin Sesquihydrate] Hives     Severe rash and SOB       DIET  Orders Placed This Encounter   Procedures    Diet Order Diet: Cardiac     Standing Status:   Standing     Number of Occurrences:   1     Order Specific Question:   Diet:     Answer:   Cardiac [6]       ACTIVITY  As tolerated and directed by skilled nursing.      CONSULTATIONS  None    PROCEDURES      CT-CTA CHEST PULMONARY ARTERY W/ RECONS   Final Result         1.  No large central pulmonary embolus is appreciated, evaluation of the subsegmental branches is essentially nondiagnostic due to motion artifacts. Additional imaging would be required for definitive exclusion of small distal pulmonary emboli.   2.  Multiple low-density hepatic lesions,  appearance suggesting hepatic cysts, otherwise indeterminate.   3.  Pulmonary nodule, see nodule follow-up recommendations below.      Fleischner Society pulmonary nodule recommendations:      Low Risk: No routine follow-up      High Risk: Optional CT at 12 months      Comments: Nodules less than 6 mm do not require routine follow-up, but certain patients at high risk with suspicious nodule morphology, upper lobe location, or both may warrant 12-month follow-up.      Low Risk - Minimal or absent history of smoking and of other known risk factors.      High Risk - History of smoking or of other known risk factors.      Note: These recommendations do not apply to lung cancer screening, patients with immunosuppression, or patients with known primary cancer.      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      DX-SHOULDER 2+ RIGHT   Final Result      1. 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   2. No acute fracture.      DX-CHEST-PORTABLE (1 VIEW)   Final Result      1. No acute findings.   2. Postsurgical changes of reverse left total shoulder arthroplasty.   3. Progressive marked degenerative changes in the right glenohumeral joint.           LABORATORY  Lab Results   Component Value Date    SODIUM 142 05/23/2024    POTASSIUM 3.5 (L) 05/23/2024    CHLORIDE 107 05/23/2024    CO2 24 05/23/2024    GLUCOSE 94 05/23/2024    BUN 14 05/23/2024    CREATININE 0.61 05/23/2024    CREATININE 0.9 04/21/2009        Lab Results   Component Value Date    WBC 3.4 (L) 05/23/2024    HEMOGLOBIN 12.1 05/23/2024    HEMATOCRIT 37.4 05/23/2024    PLATELETCT 206 05/23/2024        Total time of the discharge process exceeds 31 minutes.

## 2024-05-23 NOTE — PROGRESS NOTES
Spoke to Dulce Maria from scheduling to set up follow-up appointments. She states she will send referral for pulmonary and will work on PCP and Ortho appointments.     Appointment set up with orthopedic surgery and PCP.    1300 Spoke to patient's daughter Riddhi. She states that patient's other daughter Cherelle will be picking patient up around 4pm this afternoon. MD notified.       1414: Per MD Osman, ok to discontinue isolation precautions. Patient has not had a bowel movement since the morning of 5/22/24.     1645: Patient discharged home with Home health. IV removed and tele box removed and returned to monitor tech. Patient's daughters Love and Camelia states all questions and concerns have been addressed appropriately. Verbalized understanding of discharge instructions and to follow up with PCP, Orthopedic surgery and to expect contact from [facility]. New prescriptions sent to [facility] .     Patient off the unit with daughters Camelia and Love .

## 2024-05-23 NOTE — FACE TO FACE
"Face to Face Note  -  Durable Medical Equipment    Cristino Polanco M.D. - NPI: [id number]  I certify that this patient is under my care and that they had a durable medical equipment(DME)face to face encounter by myself that meets the physician DME face-to-face encounter requirements with this patient on:    Date of encounter:   Patient:                    MRN:                       YOB: 2023  Navya Mcbride  [id number]  1935     The encounter with the patient was in whole, or in part, for the following medical condition, which is the primary reason for durable medical equipment:  Other - hypoxia due to atelectasis, patient also with hx of COPD    I certify that, based on my findings, the following durable medical equipment is medically necessary:    Oxygen   HOME O2 Saturation Measurements:(Values must be present for Home Oxygen orders)  Room air sat at rest: 87  Room air sat with amb: 87  With liters of O2: 1, O2 sat at rest with O2: 92  With Liters of O2: 1, O2 sat with amb with O2 : 92  Is the patient mobile?: Yes  If patient feels more short of breath, they can go up to 6 liters per minute and contact healthcare provider.    Supporting Symptoms: The patient requires supplemental oxygen, as the following interventions have been tried with limited or no improvement: \"Ambulation with oximetry.    My Clinical findings support the need for the above equipment due to:  Hypoxia  "

## 2024-05-23 NOTE — CARE PLAN
The patient is Stable - Low risk of patient condition declining or worsening    Shift Goals  Clinical Goals: reorientation, safety  Patient Goals: carly  Family Goals: update poc, placement        Patient is not progressing towards the following goals:      Problem: Knowledge Deficit - Standard  Goal: Patient and family/care givers will demonstrate understanding of plan of care, disease process/condition, diagnostic tests and medications  Outcome: Not Progressing     Problem: Fall Risk  Goal: Patient will remain free from falls  Outcome: Not Progressing

## 2024-05-25 LAB
BACTERIA BLD CULT: NORMAL
SIGNIFICANT IND 70042: NORMAL
SITE SITE: NORMAL
SOURCE SOURCE: NORMAL

## 2024-07-23 ENCOUNTER — TELEPHONE (OUTPATIENT)
Dept: HEALTH INFORMATION MANAGEMENT | Facility: OTHER | Age: 89
End: 2024-07-23
Payer: MEDICARE

## 2024-11-26 PROBLEM — M25.551 CHRONIC RIGHT HIP PAIN: Status: ACTIVE | Noted: 2024-11-26

## 2024-11-26 PROBLEM — G89.29 CHRONIC RIGHT HIP PAIN: Status: ACTIVE | Noted: 2024-11-26

## 2024-12-07 DIAGNOSIS — Z86.73 H/O: STROKE: ICD-10-CM

## 2024-12-10 ENCOUNTER — PRE-ADMISSION TESTING (OUTPATIENT)
Dept: ADMISSIONS | Facility: MEDICAL CENTER | Age: 89
End: 2024-12-10
Attending: ORTHOPAEDIC SURGERY
Payer: MEDICARE

## 2024-12-11 ENCOUNTER — PRE-ADMISSION TESTING (OUTPATIENT)
Dept: ADMISSIONS | Facility: MEDICAL CENTER | Age: 89
End: 2024-12-11
Attending: ORTHOPAEDIC SURGERY
Payer: MEDICARE

## 2024-12-11 VITALS — HEIGHT: 62 IN | BODY MASS INDEX: 23.65 KG/M2 | WEIGHT: 128.53 LBS

## 2024-12-11 DIAGNOSIS — Z01.812 PRE-OPERATIVE LABORATORY EXAMINATION: ICD-10-CM

## 2024-12-11 DIAGNOSIS — Z01.810 PRE-OPERATIVE CARDIOVASCULAR EXAMINATION: ICD-10-CM

## 2024-12-11 DIAGNOSIS — M25.551 PAIN OF RIGHT HIP: ICD-10-CM

## 2024-12-11 DIAGNOSIS — M19.019 PRIMARY OSTEOARTHRITIS OF SHOULDER, UNSPECIFIED LATERALITY: ICD-10-CM

## 2024-12-11 LAB
ANION GAP SERPL CALC-SCNC: 9 MMOL/L (ref 7–16)
BUN SERPL-MCNC: 24 MG/DL (ref 8–22)
CALCIUM SERPL-MCNC: 9.8 MG/DL (ref 8.4–10.2)
CHLORIDE SERPL-SCNC: 102 MMOL/L (ref 96–112)
CO2 SERPL-SCNC: 27 MMOL/L (ref 20–33)
CREAT SERPL-MCNC: 0.77 MG/DL (ref 0.5–1.4)
EKG IMPRESSION: NORMAL
ERYTHROCYTE [DISTWIDTH] IN BLOOD BY AUTOMATED COUNT: 41.8 FL (ref 35.9–50)
GFR SERPLBLD CREATININE-BSD FMLA CKD-EPI: 74 ML/MIN/1.73 M 2
GLUCOSE SERPL-MCNC: 89 MG/DL (ref 65–99)
HCT VFR BLD AUTO: 39.3 % (ref 37–47)
HGB BLD-MCNC: 12.7 G/DL (ref 12–16)
MCH RBC QN AUTO: 29.1 PG (ref 27–33)
MCHC RBC AUTO-ENTMCNC: 32.3 G/DL (ref 32.2–35.5)
MCV RBC AUTO: 90.1 FL (ref 81.4–97.8)
PLATELET # BLD AUTO: 255 K/UL (ref 164–446)
PMV BLD AUTO: 10.7 FL (ref 9–12.9)
POTASSIUM SERPL-SCNC: 4.2 MMOL/L (ref 3.6–5.5)
RBC # BLD AUTO: 4.36 M/UL (ref 4.2–5.4)
SCCMEC + MECA PNL NOSE NAA+PROBE: NEGATIVE
SCCMEC + MECA PNL NOSE NAA+PROBE: POSITIVE
SODIUM SERPL-SCNC: 138 MMOL/L (ref 135–145)
WBC # BLD AUTO: 6.1 K/UL (ref 4.8–10.8)

## 2024-12-11 PROCEDURE — 36415 COLL VENOUS BLD VENIPUNCTURE: CPT

## 2024-12-11 PROCEDURE — 93005 ELECTROCARDIOGRAM TRACING: CPT | Mod: TC

## 2024-12-11 PROCEDURE — 93010 ELECTROCARDIOGRAM REPORT: CPT | Performed by: INTERNAL MEDICINE

## 2024-12-11 PROCEDURE — 87640 STAPH A DNA AMP PROBE: CPT | Mod: XU

## 2024-12-11 PROCEDURE — 87641 MR-STAPH DNA AMP PROBE: CPT

## 2024-12-11 PROCEDURE — 80048 BASIC METABOLIC PNL TOTAL CA: CPT

## 2024-12-11 PROCEDURE — 85027 COMPLETE CBC AUTOMATED: CPT

## 2024-12-11 RX ORDER — ROSUVASTATIN CALCIUM 20 MG/1
20 TABLET, COATED ORAL EVERY EVENING
Qty: 90 TABLET | Refills: 0 | Status: SHIPPED | OUTPATIENT
Start: 2024-12-11

## 2024-12-11 ASSESSMENT — FIBROSIS 4 INDEX: FIB4 SCORE: 2.4

## 2024-12-11 NOTE — PREADMIT AVS NOTE
Current Medications   Medication Instructions    NON SPECIFIED Stop 7 days before surgery    rosuvastatin (CRESTOR) 20 MG Tab Continue taking medication prior to surgery    metoprolol SR (TOPROL XL) 25 MG TABLET SR 24 HR Continue taking medication as prescribed, including morning of procedure     CYANOCOBALAMIN INJ Stop 7 days before surgery    multivitamin Tab Stop 7 days before surgery    carbamazepine (CARBATROL) 200 MG CR capsule Continue taking medication as prescribed, including morning of procedure     QUEtiapine (SEROQUEL) 50 MG tablet Continue taking medication prior to surgery    HYDROcodone-acetaminophen (NORCO) 7.5-325 MG tab As needed medication, may take if needed, including morning of procedure     LORazepam (ATIVAN) 0.5 MG Tab As needed medication, may take if needed, including morning of procedure     ALPRAZolam (XANAX) 0.5 MG Tab As needed medication, may take if needed, including morning of procedure     Coenzyme Q10 300 MG Cap Stop 7 days before surgery    nitroglycerin (NITROSTAT) 0.4 MG SL Tab As needed medication, may take if needed, including morning of procedure     clopidogrel (PLAVIX) 75 MG Tab Follow instructions from Surgeon or Specialist     DULoxetine (CYMBALTA) 60 MG Cap DR Particles delayed-release capsule Continue taking medication as prescribed, including morning of procedure     COMBIVENT RESPIMAT  MCG/ACT Aero Soln Continue taking medication as prescribed, including morning of procedure     latanoprost (XALATAN) 0.005 % Solution Continue taking medication as prescribed, including morning of procedure     pantoprazole (PROTONIX) 40 MG Tablet Delayed Response Continue taking medication as prescribed, including morning of procedure     budesonide-formoterol (SYMBICORT) 80-4.5 MCG/ACT Aerosol Continue taking medication as prescribed, including morning of procedure     albuterol 108 (90 Base) MCG/ACT Aero Soln inhalation aerosol As needed medication, may take if needed,  including morning of procedure     celecoxib (CELEBREX) 200 MG Cap Stop 5 days before surgery    cyclosporin (RESTASIS) 0.05 % ophthalmic emulsion Continue taking medication as prescribed, including morning of procedure

## 2024-12-11 NOTE — DISCHARGE PLANNING
DISCHARGE PLANNING NOTE - TOTAL JOINT    Procedure: Procedure(s):  Right hip conversion intramedullary nail to a right total hip arthroplasty  Procedure Date: 1/2/2025  Insurance: Payor: MEDICARE / Plan: MEDICARE PART A & B / Product Type: *No Product type* /    Equipment currently available at home?  wheelchair and Shower Bench, Hand held shower head, Grab bars. , bedside commode.  Steps into the home? 1  Steps within the home? 0  Toilet height? Standard  Type of shower? walk-in shower  Home Oxygen? 3L at NOC  Portable tank?  Yes  Oxygen Provider: Tk  Planning same day discharge: No, spending the night.    Is Outpatient Physical Therapy set up after surgery? Yes  Did you take the Total Joint Class and where? Yes, received NAON book.  Who will be your transportation home on day of discharge? Malinda- daughter    Have you made arrangements to have someone stay with you at home for the first 3 days following discharge, and if so, whom? Malinda- daughter    Have you notified your surgeon that you do not have transportation or someone to help you after discharge? No    Are you planning on going to a transitional care facility, for example a skilled nursing facility, post operatively for rehab, and if so, have you contacted your insurance plan to see if they cover this? No    Met with the pt. Pt given a copy of Home Safety Checklist, Equipment Resource Guide, CHG scrub kit and instructions. Expected to spend the night after surgery. All questions answered and verbalizes understanding of all instructions. No dc needs identified at this time. Anticipate dc to home without barriers.

## 2024-12-11 NOTE — PREPROCEDURE INSTRUCTIONS
PreAdmit Appointment: Reviewed the Preparing for your procedure handout with patient & daughter. Patient & daughter instructed per pharmacy guidelines regarding taking, holding or contacting provider for instructions on regularly prescribed medications before surgery. Instructed to take the following medications the day of surgery with a sip of water per pharmacy medication guidelines:   Metoprolol, Carbatrol, Norco as needed, Ativan as needed, Xanax as needed, Nitrostat as needed, Combivent inhaler, Xalatan, Protonix, Symbicort, Albuterol as needed, Restasis.    Confirmed with patient where to check in morning of surgery. Handouts/Brochure/AVS given to patient's daughter.    Case message sent to Dr. Matute's MA notifying that pts daughter had concerns that pt may have UTI d/t urinary frequency. Pt is scheduled to have an appointment with PCP on 12/18 and daughter will bring it up to PCP as well.
